# Patient Record
Sex: MALE | Race: WHITE | NOT HISPANIC OR LATINO | Employment: FULL TIME | URBAN - METROPOLITAN AREA
[De-identification: names, ages, dates, MRNs, and addresses within clinical notes are randomized per-mention and may not be internally consistent; named-entity substitution may affect disease eponyms.]

---

## 2020-03-16 ENCOUNTER — APPOINTMENT (EMERGENCY)
Dept: RADIOLOGY | Facility: HOSPITAL | Age: 60
End: 2020-03-16
Payer: COMMERCIAL

## 2020-03-16 ENCOUNTER — HOSPITAL ENCOUNTER (EMERGENCY)
Facility: HOSPITAL | Age: 60
Discharge: HOME | End: 2020-03-16
Attending: EMERGENCY MEDICINE | Admitting: EMERGENCY MEDICINE
Payer: COMMERCIAL

## 2020-03-16 VITALS
WEIGHT: 188.05 LBS | OXYGEN SATURATION: 97 % | TEMPERATURE: 98.6 F | SYSTOLIC BLOOD PRESSURE: 141 MMHG | RESPIRATION RATE: 19 BRPM | HEART RATE: 82 BPM | DIASTOLIC BLOOD PRESSURE: 84 MMHG

## 2020-03-16 DIAGNOSIS — J20.9 ACUTE BRONCHITIS: Primary | ICD-10-CM

## 2020-03-16 DIAGNOSIS — J44.9 COPD (CHRONIC OBSTRUCTIVE PULMONARY DISEASE) (HCC): ICD-10-CM

## 2020-03-16 PROCEDURE — 71046 X-RAY EXAM CHEST 2 VIEWS: CPT

## 2020-03-16 PROCEDURE — 99283 EMERGENCY DEPT VISIT LOW MDM: CPT

## 2020-03-16 PROCEDURE — 99284 EMERGENCY DEPT VISIT MOD MDM: CPT | Performed by: PHYSICIAN ASSISTANT

## 2020-03-16 RX ORDER — DOXYCYCLINE HYCLATE 100 MG/1
100 CAPSULE ORAL 2 TIMES DAILY
Qty: 14 CAPSULE | Refills: 0 | Status: SHIPPED | OUTPATIENT
Start: 2020-03-16 | End: 2020-03-23

## 2020-03-16 RX ORDER — ALBUTEROL SULFATE 90 UG/1
2 AEROSOL, METERED RESPIRATORY (INHALATION) EVERY 4 HOURS PRN
Qty: 1 INHALER | Refills: 0 | Status: SHIPPED | OUTPATIENT
Start: 2020-03-16

## 2020-03-16 RX ORDER — PREDNISONE 50 MG/1
50 TABLET ORAL DAILY
Qty: 5 TABLET | Refills: 0 | Status: SHIPPED | OUTPATIENT
Start: 2020-03-16 | End: 2020-03-26

## 2020-03-16 RX ORDER — BENZONATATE 100 MG/1
100 CAPSULE ORAL EVERY 8 HOURS
Qty: 12 CAPSULE | Refills: 0 | Status: SHIPPED | OUTPATIENT
Start: 2020-03-16 | End: 2020-03-20

## 2020-03-16 NOTE — ED PROVIDER NOTES
History  Chief Complaint   Patient presents with    Cough     cough/congestion for 1 week     Patient presents emergency room with a 1 week history of coughing with nasal congestion and postnasal drip  He denies fever chills  He is an active smoker  He has been using an inhaled steroid without any relief  He has not had any recent travels  He has not been exposed to anybody with the [place] 19 virus  Past medical history is positive for COPD  History provided by:  Patient  Cough   Cough characteristics:  Productive  Sputum characteristics:  Nondescript  Onset quality:  Gradual  Duration:  1 week  Timing:  Intermittent  Chronicity:  New  Context: smoke exposure, upper respiratory infection, weather changes and with activity    Context: not animal exposure, not exposure to allergens, not fumes, not occupational exposure and not sick contacts    Relieved by:  None tried  Worsened by: Activity, deep breathing, lying down, smoking and exposure to cold air  Ineffective treatments:  Rest (Steroid inhalor)  Associated symptoms: rhinorrhea, sinus congestion and wheezing    Associated symptoms: no chest pain, no chills, no diaphoresis, no ear fullness, no ear pain, no eye discharge, no fever, no headaches, no myalgias, no rash, no shortness of breath, no sore throat and no weight loss    Rhinorrhea:     Quality:  Clear    Severity:  Moderate    Duration:  1 week    Timing:  Intermittent    Progression:  Unchanged  Risk factors: recent infection    Risk factors: no chemical exposure and no recent travel        None       Past Medical History:   Diagnosis Date    COPD (chronic obstructive pulmonary disease) ([facility] [address] )        History reviewed  No pertinent surgical history  History reviewed  No pertinent family history  I have reviewed and agree with the history as documented      E-Cigarette/Vaping    E-Cigarette Use Never User      E-Cigarette/Vaping Substances    Nicotine No     THC No     CBD No     Flavoring No  Other No     Unknown No      Social History     Tobacco Use    Smoking status: Current Every Day Smoker    Smokeless tobacco: Current User   Substance Use Topics    Alcohol use: Not Currently    Drug use: Never       Review of Systems   Constitutional: Positive for activity change  Negative for appetite change, chills, diaphoresis, fatigue, fever, unexpected weight change and weight loss  HENT: Positive for congestion, postnasal drip and rhinorrhea  Negative for ear discharge, ear pain, mouth sores and sore throat  Eyes: Negative for discharge  Respiratory: Positive for cough and wheezing  Negative for shortness of breath  Cardiovascular: Negative for chest pain  Musculoskeletal: Negative for myalgias  Skin: Negative for color change, pallor and rash  Neurological: Negative for headaches  Psychiatric/Behavioral: Negative for confusion  All other systems reviewed and are negative  Physical Exam  Physical Exam   Constitutional: He is oriented to person, place, and time  He appears well-developed and well-nourished  No distress  HENT:   Head: Normocephalic and atraumatic  Right Ear: External ear normal    Left Ear: External ear normal    Mouth/Throat: No oropharyngeal exudate  Clear rhinorrhea, clear postnasal drip   Eyes: Conjunctivae are normal  Right eye exhibits no discharge  Left eye exhibits no discharge  Neck: Neck supple  Cardiovascular: Normal rate, regular rhythm and normal heart sounds  Exam reveals no gallop and no friction rub  No murmur heard  Pulmonary/Chest: Effort normal    Scattered rhonchi and expiratory wheezes  Lymphadenopathy:     He has cervical adenopathy  Neurological: He is alert and oriented to person, place, and time  Skin: Skin is warm  Capillary refill takes less than 2 seconds  No rash noted  He is not diaphoretic  Psychiatric: He has a normal mood and affect   His behavior is normal  Judgment and thought content normal    Nursing note and vitals reviewed  Vital Signs  ED Triage Vitals   Temperature Pulse Respirations Blood Pressure SpO2   03/16/20 0826 03/16/20 0826 03/16/20 0826 03/16/20 0826 03/16/20 0826   98 6 °F (37 °C) 67 16 (!) 192/102 97 %      Temp Source Heart Rate Source Patient Position - Orthostatic VS BP Location FiO2 (%)   03/16/20 0826 03/16/20 0826 03/16/20 0826 03/16/20 0826 --   Oral Monitor Sitting Right arm       Pain Score       03/16/20 0845       No Pain           Vitals:    03/16/20 0826 03/16/20 0840 03/16/20 0845   BP: (!) 192/102 148/83 141/84   Pulse: 67 83 82   Patient Position - Orthostatic VS: Sitting Lying Lying         Visual Acuity      ED Medications  Medications - No data to display    Diagnostic Studies  Results Reviewed     None                 XR chest 2 views   ED Interpretation by Farideh Liu PA-C (03/16 0911)   COPD, no pneumonia      Final Result by Ashly Woodard MD (03/16 1003)      No definite acute cardiopulmonary disease  Hyperlucency in the right upper lung due to emphysema producing vascular crowding in the right midlung  Workstation performed: [id number]                    Procedures  Procedures         ED Course                                 MDM  Number of Diagnoses or Management Options  Acute bronchitis: new and requires workup  COPD (chronic obstructive pulmonary disease) ([facility] [address] ): established and worsening     Amount and/or Complexity of Data Reviewed  Tests in the radiology section of CPT®: ordered and reviewed  Tests in the medicine section of CPT®: ordered and reviewed    Risk of Complications, Morbidity, and/or Mortality  Presenting problems: moderate  Diagnostic procedures: moderate  Management options: moderate  General comments: Patient presents emergency room for evaluation of a productive cough  He was seen and evaluated  An x-ray was obtained which demonstrated COPD but no acute pneumonia    The patient was given prescriptions for an albuterol inhaler, prednisone, doxycycline, Tessalon Perles  He was instructed to follow up with his family physician for recheck in 2 days  Should his symptoms worsen he was given reasons to return to the emergency room  Patient Progress  Patient progress: stable        Disposition  Final diagnoses:   Acute bronchitis   COPD (chronic obstructive pulmonary disease) ([facility] [address] )     Time reflects when diagnosis was documented in both MDM as applicable and the Disposition within this note     Time User Action Codes Description Comment    3/16/2020  8:56 AM Arlet Abbott Add [J20 9] Acute bronchitis     3/16/2020  9:12 AM Aniya Lopez Add [J44 9] COPD (chronic obstructive pulmonary disease) [facility])       ED Disposition     ED Disposition Condition Date/Time Comment    Discharge Stable Mon Mar 16, 2020  8:56 AM Nuzhat Alvarenga discharge to home/self care  Follow-up Information     Follow up With Specialties Details Why Contact Info    Lupe Avilez MD Family Medicine Schedule an appointment as soon as possible for a visit in 2 days  [place] [address] [address]ney [address]el  [phone]            Discharge Medication List as of 3/16/2020  9:22 AM      START taking these medications    Details   albuterol (PROVENTIL HFA,VENTOLIN HFA) 90 mcg/act inhaler Inhale 2 puffs every 4 (four) hours as needed for wheezing, Starting Mon 3/16/2020, Normal      benzonatate (TESSALON PERLES) 100 mg capsule Take 1 capsule (100 mg total) by mouth every 8 (eight) hours for 12 doses, Starting Mon 3/16/2020, Until Fri 3/20/2020, Normal      doxycycline hyclate (VIBRAMYCIN) 100 mg capsule Take 1 capsule (100 mg total) by mouth 2 (two) times a day for 7 days, Starting Mon 3/16/2020, Until Mon 3/23/2020, Normal      predniSONE 50 mg tablet Take 1 tablet (50 mg total) by mouth daily for 10 days, Starting Mon 3/16/2020, Until Thu 3/26/2020, Normal           No discharge procedures on file      PDMP Review     None          ED Provider  Electronically Signed by           Saurabh Domínguez PA-C  03/16/20 4889

## 2020-03-16 NOTE — ED NOTES
Discharge instructions reviewed with patient by REJI Haile  Pt ambulated to waiting room in negative distress   Slow, steady gait noted     Corky Azevedo RN  03/16/20 2098

## 2020-03-22 ENCOUNTER — HOSPITAL ENCOUNTER (EMERGENCY)
Facility: HOSPITAL | Age: 60
Discharge: HOME | End: 2020-03-22
Attending: EMERGENCY MEDICINE | Admitting: EMERGENCY MEDICINE
Payer: COMMERCIAL

## 2020-03-22 VITALS
TEMPERATURE: 97.8 F | OXYGEN SATURATION: 94 % | HEART RATE: 68 BPM | SYSTOLIC BLOOD PRESSURE: 153 MMHG | WEIGHT: 184.08 LBS | DIASTOLIC BLOOD PRESSURE: 94 MMHG | RESPIRATION RATE: 20 BRPM

## 2020-03-22 DIAGNOSIS — J44.1 COPD WITH ACUTE EXACERBATION (HCC): Primary | ICD-10-CM

## 2020-03-22 DIAGNOSIS — J06.9 UPPER RESPIRATORY INFECTION: ICD-10-CM

## 2020-03-22 PROCEDURE — 94644 CONT INHLJ TX 1ST HOUR: CPT

## 2020-03-22 PROCEDURE — 99285 EMERGENCY DEPT VISIT HI MDM: CPT

## 2020-03-22 PROCEDURE — 94760 N-INVAS EAR/PLS OXIMETRY 1: CPT

## 2020-03-22 PROCEDURE — 99283 EMERGENCY DEPT VISIT LOW MDM: CPT | Performed by: EMERGENCY MEDICINE

## 2020-03-22 RX ORDER — PREDNISONE 50 MG/1
50 TABLET ORAL DAILY
Qty: 5 TABLET | Refills: 0 | Status: SHIPPED | OUTPATIENT
Start: 2020-03-22 | End: 2020-03-27

## 2020-03-22 RX ORDER — ASPIRIN 81 MG/1
81 TABLET, CHEWABLE ORAL DAILY
COMMUNITY

## 2020-03-22 RX ORDER — SODIUM CHLORIDE FOR INHALATION 0.9 %
12 VIAL, NEBULIZER (ML) INHALATION ONCE
Status: COMPLETED | OUTPATIENT
Start: 2020-03-22 | End: 2020-03-22

## 2020-03-22 RX ADMIN — IPRATROPIUM BROMIDE 1 MG: 0.5 SOLUTION RESPIRATORY (INHALATION) at 12:41

## 2020-03-22 RX ADMIN — ALBUTEROL SULFATE 10 MG: 2.5 SOLUTION RESPIRATORY (INHALATION) at 12:41

## 2020-03-22 RX ADMIN — ISODIUM CHLORIDE 12 ML: 0.03 SOLUTION RESPIRATORY (INHALATION) at 12:41

## 2020-03-22 NOTE — ED PROVIDER NOTES
History  Chief Complaint   Patient presents with    Shortness of Breath     Evaluated in ED 3/16 for SOB  hx of COPD  here for same  continued symptoms  Patient presents to the emergency department for evaluation of breathing difficulty  Patient has a history of COPD and was here 5 days ago with similar presentation a mostly nonproductive cough  He had a negative chest x-ray and was placed on doxycycline along with steroids and discharged with an inhaler  He is complaining of exertional dyspnea without chest pain jaw arm or neck pain  He denies fever chills  Denies myalgias and arthralgias  Patient denies abdominal pain nausea vomiting diarrhea  Prior to Admission Medications   Prescriptions Last Dose Informant Patient Reported? Taking? albuterol (PROVENTIL HFA,VENTOLIN HFA) 90 mcg/act inhaler   No Yes   Sig: Inhale 2 puffs every 4 (four) hours as needed for wheezing   aspirin 81 mg chewable tablet   Yes Yes   Sig: Chew 81 mg daily   doxycycline hyclate (VIBRAMYCIN) 100 mg capsule   No No   Sig: Take 1 capsule (100 mg total) by mouth 2 (two) times a day for 7 days   fluticasone-salmeterol (Advair Diskus) 250-50 mcg/dose inhaler   Yes Yes   Sig: Inhale 1 puff 2 (two) times a day   predniSONE 50 mg tablet   No No   Sig: Take 1 tablet (50 mg total) by mouth daily for 10 days      Facility-Administered Medications: None       Past Medical History:   Diagnosis Date    COPD (chronic obstructive pulmonary disease) ([facility] [address] )        History reviewed  No pertinent surgical history  History reviewed  No pertinent family history  I have reviewed and agree with the history as documented      E-Cigarette/Vaping    E-Cigarette Use Never User      E-Cigarette/Vaping Substances    Nicotine No     THC No     CBD No     Flavoring No     Other No     Unknown No      Social History     Tobacco Use    Smoking status: Current Every Day Smoker    Smokeless tobacco: Current User   Substance Use Topics    Alcohol use: Not Currently    Drug use: Never       Review of Systems   Constitutional: Negative  Negative for activity change, appetite change, chills, diaphoresis, fatigue and fever  HENT: Positive for congestion  Negative for drooling, ear discharge, ear pain, nosebleeds, postnasal drip, rhinorrhea, sinus pressure, sinus pain, sneezing, sore throat, tinnitus, trouble swallowing and voice change  Eyes: Negative  Negative for photophobia and visual disturbance  Respiratory: Positive for cough and shortness of breath  Negative for choking, chest tightness, wheezing and stridor  Cardiovascular: Negative  Negative for chest pain, palpitations and leg swelling  Gastrointestinal: Negative  Negative for abdominal distention, abdominal pain, anal bleeding, blood in stool, diarrhea, nausea, rectal pain and vomiting  Endocrine: Negative  Genitourinary: Positive for decreased urine volume  Negative for discharge, dysuria, flank pain, frequency, hematuria, penile pain, penile swelling, scrotal swelling and testicular pain  Musculoskeletal: Negative  Negative for arthralgias, back pain, gait problem, joint swelling, myalgias, neck pain and neck stiffness  Skin: Negative  Negative for rash and wound  Allergic/Immunologic: Negative  Neurological: Negative  Negative for dizziness, tremors, seizures, syncope, facial asymmetry, speech difficulty, weakness, light-headedness, numbness and headaches  Hematological: Negative  Does not bruise/bleed easily  Psychiatric/Behavioral: Negative  Negative for agitation, behavioral problems and confusion  Physical Exam  Physical Exam   Constitutional: He is oriented to person, place, and time  He appears well-developed and well-nourished  Non-toxic appearance  He does not appear ill  No distress  He is not intubated  Nontoxic appearance  No obvious respiratory distress    Patient can speak in full sentences engage in normal conversation and answer simple questions appropriately  He does not appear uncomfortable or in pain  HENT:   Head: Normocephalic and atraumatic  Right Ear: External ear normal    Left Ear: External ear normal    Mouth/Throat: Oropharynx is clear and moist  No oropharyngeal exudate or posterior oropharyngeal edema  Eyes: Pupils are equal, round, and reactive to light  Conjunctivae and EOM are normal    Neck: Normal range of motion  Neck supple  Cardiovascular: Normal rate, regular rhythm, normal heart sounds and intact distal pulses  Pulmonary/Chest: Effort normal  No accessory muscle usage or stridor  No apnea, no tachypnea and no bradypnea  He is not intubated  No respiratory distress  He has decreased breath sounds  He has wheezes  He has no rhonchi  He has no rales  Chest wall is not dull to percussion  He exhibits no mass, no tenderness, no bony tenderness, no laceration, no crepitus, no edema, no deformity, no swelling and no retraction  Abdominal: Soft  Bowel sounds are normal  He exhibits no distension, no ascites and no mass  There is no splenomegaly or hepatomegaly  There is no tenderness  There is no rebound and no guarding  Musculoskeletal: Normal range of motion  Right lower leg: Normal  He exhibits no tenderness and no edema  Left lower leg: Normal  He exhibits no tenderness and no edema  Neurological: He is alert and oriented to person, place, and time  He has normal reflexes  He is not disoriented  No cranial nerve deficit  Skin: Skin is warm and dry  Capillary refill takes less than 2 seconds  No ecchymosis and no rash noted  He is not diaphoretic  No cyanosis or erythema  No pallor  Nails show no clubbing  Psychiatric: He has a normal mood and affect  His behavior is normal  His mood appears not anxious  He is not agitated  Nursing note and vitals reviewed        Vital Signs  ED Triage Vitals [03/22/20 1221]   Temperature Pulse Respirations Blood Pressure SpO2   97 8 °F (36 6 °C) 73 20 153/94 97 %      Temp Source Heart Rate Source Patient Position - Orthostatic VS BP Location FiO2 (%)   Oral Monitor Sitting Right arm --      Pain Score       --           Vitals:    03/22/20 1221   BP: 153/94   Pulse: 73   Patient Position - Orthostatic VS: Sitting         Visual Acuity      ED Medications  Medications   albuterol inhalation solution 10 mg (10 mg Nebulization Given 3/22/20 1241)   ipratropium (ATROVENT) 0 02 % inhalation solution 1 mg (1 mg Nebulization Given 3/22/20 1241)   sodium chloride 0 9 % inhalation solution 12 mL (12 mL Nebulization Given 3/22/20 1241)       Diagnostic Studies  Results Reviewed     None                 No orders to display              Procedures  Procedures         ED Course  ED Course as of Mar 22 1342   Sun Mar 22, 2020   1324 Patient improved after prolonged albuterol neb  Patient is stable discharge  Advised to continue antibiotics and steroids and inhaler  Discussed signs and symptoms return to department  Encouraged to follow up with private physician  1342 Patient given 5 days prednisone  MDM      Disposition  Final diagnoses:   COPD with acute exacerbation ([facility] [address] )   Upper respiratory infection     Time reflects when diagnosis was documented in both MDM as applicable and the Disposition within this note     Time User Action Codes Description Comment    3/22/2020  1:25 PM Ashli Long Add [J44 1] COPD with acute exacerbation ([facility] [address] )     3/22/2020  1:26 PM Renan Fortune Add [J06 9] Upper respiratory infection       ED Disposition     ED Disposition Condition Date/Time Comment    Discharge Stable [place] Mar 22, 2020  1:25 PM [place] discharge to home/self care              Follow-up Information     Follow up With Specialties Details Why Contact Info    Gamaliel Tirado MD Family Medicine Schedule an appointment as soon as possible for a visit   [address] [place] [address] Viola Mueller Patient's Medications   Discharge Prescriptions    PREDNISONE 50 MG TABLET    Take 1 tablet (50 mg total) by mouth daily for 5 days       Start Date: 3/22/2020 End Date: 3/27/2020       Order Dose: 50 mg       Quantity: 5 tablet    Refills: 0     No discharge procedures on file      PDMP Review     None          ED Provider  Electronically Signed by           Pasha Slater MD  03/22/20 MD Justina  03/22/20 1395

## 2022-02-05 ENCOUNTER — APPOINTMENT (EMERGENCY)
Dept: RADIOLOGY | Facility: HOSPITAL | Age: 62
End: 2022-02-05
Payer: COMMERCIAL

## 2022-02-05 ENCOUNTER — HOSPITAL ENCOUNTER (EMERGENCY)
Facility: HOSPITAL | Age: 62
Discharge: HOME | End: 2022-02-05
Attending: EMERGENCY MEDICINE | Admitting: EMERGENCY MEDICINE
Payer: COMMERCIAL

## 2022-02-05 VITALS
DIASTOLIC BLOOD PRESSURE: 91 MMHG | SYSTOLIC BLOOD PRESSURE: 162 MMHG | HEIGHT: 71 IN | RESPIRATION RATE: 16 BRPM | HEART RATE: 60 BPM | OXYGEN SATURATION: 98 % | TEMPERATURE: 97.6 F | BODY MASS INDEX: 25.67 KG/M2

## 2022-02-05 DIAGNOSIS — R07.9 CHEST PAIN, UNSPECIFIED TYPE: Primary | ICD-10-CM

## 2022-02-05 LAB
2HR DELTA HS TROPONIN: 0 NG/L
ALBUMIN SERPL BCP-MCNC: 3.7 G/DL (ref 3.5–5)
ALP SERPL-CCNC: 74 U/L (ref 46–116)
ALT SERPL W P-5'-P-CCNC: 19 U/L (ref 12–78)
ANION GAP SERPL CALCULATED.3IONS-SCNC: 8 MMOL/L (ref 4–13)
AST SERPL W P-5'-P-CCNC: 18 U/L (ref 5–45)
BASOPHILS # BLD AUTO: 0.06 THOUSANDS/ΜL (ref 0–0.1)
BASOPHILS NFR BLD AUTO: 1 % (ref 0–1)
BILIRUB SERPL-MCNC: 0.31 MG/DL (ref 0.2–1)
BUN SERPL-MCNC: 17 MG/DL (ref 5–25)
CALCIUM SERPL-MCNC: 8.9 MG/DL (ref 8.3–10.1)
CARDIAC TROPONIN I PNL SERPL HS: 13 NG/L
CARDIAC TROPONIN I PNL SERPL HS: 13 NG/L
CHLORIDE SERPL-SCNC: 102 MMOL/L (ref 100–108)
CO2 SERPL-SCNC: 27 MMOL/L (ref 21–32)
CREAT SERPL-MCNC: 1.14 MG/DL (ref 0.6–1.3)
EOSINOPHIL # BLD AUTO: 0.28 THOUSAND/ΜL (ref 0–0.61)
EOSINOPHIL NFR BLD AUTO: 5 % (ref 0–6)
ERYTHROCYTE [DISTWIDTH] IN BLOOD BY AUTOMATED COUNT: 14.6 % (ref 11.6–15.1)
GFR SERPL CREATININE-BSD FRML MDRD: 68 ML/MIN/1.73SQ M
GLUCOSE SERPL-MCNC: 124 MG/DL (ref 65–140)
HCT VFR BLD AUTO: 48.3 % (ref 36.5–49.3)
HGB BLD-MCNC: 15.8 G/DL (ref 12–17)
IMM GRANULOCYTES # BLD AUTO: 0.03 THOUSAND/UL (ref 0–0.2)
IMM GRANULOCYTES NFR BLD AUTO: 1 % (ref 0–2)
LYMPHOCYTES # BLD AUTO: 1.35 THOUSANDS/ΜL (ref 0.6–4.47)
LYMPHOCYTES NFR BLD AUTO: 25 % (ref 14–44)
MCH RBC QN AUTO: 29.4 PG (ref 26.8–34.3)
MCHC RBC AUTO-ENTMCNC: 32.7 G/DL (ref 31.4–37.4)
MCV RBC AUTO: 90 FL (ref 82–98)
MONOCYTES # BLD AUTO: 0.38 THOUSAND/ΜL (ref 0.17–1.22)
MONOCYTES NFR BLD AUTO: 7 % (ref 4–12)
NEUTROPHILS # BLD AUTO: 3.31 THOUSANDS/ΜL (ref 1.85–7.62)
NEUTS SEG NFR BLD AUTO: 61 % (ref 43–75)
NRBC BLD AUTO-RTO: 0 /100 WBCS
PLATELET # BLD AUTO: 238 THOUSANDS/UL (ref 149–390)
PMV BLD AUTO: 10.5 FL (ref 8.9–12.7)
POTASSIUM SERPL-SCNC: 4.2 MMOL/L (ref 3.5–5.3)
PROT SERPL-MCNC: 7.3 G/DL (ref 6.4–8.2)
RBC # BLD AUTO: 5.37 MILLION/UL (ref 3.88–5.62)
SODIUM SERPL-SCNC: 137 MMOL/L (ref 136–145)
WBC # BLD AUTO: 5.41 THOUSAND/UL (ref 4.31–10.16)

## 2022-02-05 PROCEDURE — 71046 X-RAY EXAM CHEST 2 VIEWS: CPT

## 2022-02-05 PROCEDURE — 36415 COLL VENOUS BLD VENIPUNCTURE: CPT | Performed by: PHYSICIAN ASSISTANT

## 2022-02-05 PROCEDURE — 93005 ELECTROCARDIOGRAM TRACING: CPT

## 2022-02-05 PROCEDURE — 99285 EMERGENCY DEPT VISIT HI MDM: CPT | Performed by: PHYSICIAN ASSISTANT

## 2022-02-05 PROCEDURE — 85025 COMPLETE CBC W/AUTO DIFF WBC: CPT | Performed by: PHYSICIAN ASSISTANT

## 2022-02-05 PROCEDURE — 80053 COMPREHEN METABOLIC PANEL: CPT | Performed by: PHYSICIAN ASSISTANT

## 2022-02-05 PROCEDURE — 84484 ASSAY OF TROPONIN QUANT: CPT | Performed by: PHYSICIAN ASSISTANT

## 2022-02-05 PROCEDURE — 99284 EMERGENCY DEPT VISIT MOD MDM: CPT

## 2022-02-05 NOTE — ED PROVIDER NOTES
History  Chief Complaint   Patient presents with    Abdominal Pain     per pt "he has amol having left upper quadrant pain with no N/V/D which has been ongoing for abour 3 days  "     70-year-old male presents the emergency department with complaints of upper abdominal pain  States that he has had intermittent aching pain in the left upper abdomen under his ribs over the past several days  States the pain is variable in intensity  Does not notice any modifying or alleviating factors  States the pain can be provoked  Does not always seem to occur with activity  Patient notes that he does have a very strenuous job it is not remember pulling any muscles in this area  Has not taken anything for pain prior to arrival   States that this morning pain was slightly stronger than usual so he sought evaluation in the emergency department  He denies associated chest pain, shortness of breath, nausea, vomiting, diarrhea, or diaphoresis  History provided by:  Patient   used: No        Prior to Admission Medications   Prescriptions Last Dose Informant Patient Reported? Taking? Cholecalciferol 25 MCG (1000 UT) tablet   Yes No   Sig: Take 1,000 Units by mouth daily   albuterol (PROVENTIL HFA,VENTOLIN HFA) 90 mcg/act inhaler   No Yes   Sig: Inhale 2 puffs every 4 (four) hours as needed for wheezing   aspirin 81 mg chewable tablet   Yes Yes   Sig: Chew 81 mg daily   fluticasone-salmeterol (Advair Diskus) 250-50 mcg/dose inhaler   Yes Yes   Sig: Inhale 1 puff 2 (two) times a day      Facility-Administered Medications: None       Past Medical History:   Diagnosis Date    COPD (chronic obstructive pulmonary disease) ([facility] [address] )        History reviewed  No pertinent surgical history  History reviewed  No pertinent family history  I have reviewed and agree with the history as documented      E-Cigarette/Vaping    E-Cigarette Use Never User      E-Cigarette/Vaping Substances    Nicotine No     THC No     CBD No     Flavoring No     Other No     Unknown No      Social History     Tobacco Use    Smoking status: Current Every Day Smoker    Smokeless tobacco: Current User   Vaping Use    Vaping Use: Never used   Substance Use Topics    Alcohol use: Not Currently    Drug use: Never       Review of Systems   Constitutional: Negative for activity change, appetite change, chills and fever  HENT: Negative for congestion, dental problem, drooling, ear discharge, ear pain, mouth sores, nosebleeds, rhinorrhea, sore throat and trouble swallowing  Eyes: Negative for pain, discharge and itching  Respiratory: Negative for cough, chest tightness, shortness of breath and wheezing  Cardiovascular: Positive for chest pain  Negative for palpitations  Gastrointestinal: Positive for abdominal pain  Negative for blood in stool, constipation, diarrhea, nausea and vomiting  Endocrine: Negative for cold intolerance and heat intolerance  Genitourinary: Negative for difficulty urinating, dysuria, flank pain, frequency and urgency  Skin: Negative for rash and wound  Allergic/Immunologic: Negative for food allergies and immunocompromised state  Neurological: Negative for dizziness, seizures, syncope, weakness, numbness and headaches  Psychiatric/Behavioral: Negative for agitation, behavioral problems and confusion  Physical Exam  Physical Exam  Vitals and nursing note reviewed  Constitutional:       General: He is not in acute distress  Appearance: He is not diaphoretic  HENT:      Head: Normocephalic and atraumatic  Right Ear: External ear normal       Left Ear: External ear normal       Mouth/Throat:      Pharynx: No oropharyngeal exudate  Eyes:      Conjunctiva/sclera: Conjunctivae normal    Neck:      Vascular: No JVD  Trachea: No tracheal deviation  Cardiovascular:      Rate and Rhythm: Normal rate and regular rhythm  Heart sounds: Normal heart sounds  No murmur heard    No friction rub  No gallop  Pulmonary:      Effort: Pulmonary effort is normal  No respiratory distress  Breath sounds: Normal breath sounds  No wheezing or rales  Chest:      Chest wall: No tenderness  Abdominal:      General: Bowel sounds are normal  There is no distension  Palpations: Abdomen is soft  Tenderness: There is no abdominal tenderness  There is no guarding  Musculoskeletal:         General: No tenderness or deformity  Normal range of motion  Lymphadenopathy:      Cervical: No cervical adenopathy  Skin:     General: Skin is warm and dry  Findings: No erythema or rash  Neurological:      Mental Status: He is alert and oriented to person, place, and time     Psychiatric:         Mood and Affect: Mood normal          Behavior: Behavior normal          Vital Signs  ED Triage Vitals [02/05/22 0829]   Temperature Pulse Respirations Blood Pressure SpO2   97 6 °F (36 4 °C) 74 16 162/91 98 %      Temp Source Heart Rate Source Patient Position - Orthostatic VS BP Location FiO2 (%)   Oral Monitor Lying Right arm --      Pain Score       3           Vitals:    02/05/22 0829 02/05/22 0945   BP: 162/91    Pulse: 74 60   Patient Position - Orthostatic VS: Lying          Visual Acuity      ED Medications  Medications - No data to display    Diagnostic Studies  Results Reviewed     Procedure Component Value Units Date/Time    HS Troponin I 2hr [[id number]]  (Normal) Collected: 02/05/22 1100    Lab Status: Final result Specimen: Blood from Arm, Right Updated: 02/05/22 1147     hs TnI 2hr 13 ng/L      Delta 2hr hsTnI 0 ng/L     HS Troponin I 4hr [[id number]]     Lab Status: No result Specimen: Blood     Comprehensive metabolic panel [[id number]] Collected: 02/05/22 0857    Lab Status: Final result Specimen: Blood from Arm, Right Updated: 02/05/22 0943     Sodium 137 mmol/L      Potassium 4 2 mmol/L      Chloride 102 mmol/L      CO2 27 mmol/L      ANION GAP 8 mmol/L      BUN 17 mg/dL Creatinine 1 14 mg/dL      Glucose 124 mg/dL      Calcium 8 9 mg/dL      AST 18 U/L      ALT 19 U/L      Alkaline Phosphatase 74 U/L      Total Protein 7 3 g/dL      Albumin 3 7 g/dL      Total Bilirubin 0 31 mg/dL      eGFR 68 ml/min/1 73sq m     Narrative:      [facility] guidelines for Chronic Kidney Disease (CKD):     Stage 1 with normal or high GFR (GFR > 90 mL/min/1 73 square meters)    Stage 2 Mild CKD (GFR = 60-89 mL/min/1 73 square meters)    Stage 3A Moderate CKD (GFR = 45-59 mL/min/1 73 square meters)    Stage 3B Moderate CKD (GFR = 30-44 mL/min/1 73 square meters)    Stage 4 Severe CKD (GFR = 15-29 mL/min/1 73 square meters)    Stage 5 End Stage CKD (GFR <15 mL/min/1 73 square meters)  Note: GFR calculation is accurate only with a steady state creatinine    HS Troponin 0hr (reflex protocol) [[id number]]  (Normal) Collected: 02/05/22 0857    Lab Status: Final result Specimen: Blood from Arm, Right Updated: 02/05/22 0939     hs TnI 0hr 13 ng/L     CBC and differential [[id number]] Collected: 02/05/22 0857    Lab Status: Final result Specimen: Blood from Arm, Right Updated: 02/05/22 0913     WBC 5 41 Thousand/uL      RBC 5 37 Million/uL      Hemoglobin 15 8 g/dL      Hematocrit 48 3 %      MCV 90 fL      MCH 29 4 pg      MCHC 32 7 g/dL      RDW 14 6 %      MPV 10 5 fL      Platelets 738 Thousands/uL      nRBC 0 /100 WBCs      Neutrophils Relative 61 %      Immat GRANS % 1 %      Lymphocytes Relative 25 %      Monocytes Relative 7 %      Eosinophils Relative 5 %      Basophils Relative 1 %      Neutrophils Absolute 3 31 Thousands/µL      Immature Grans Absolute 0 03 Thousand/uL      Lymphocytes Absolute 1 35 Thousands/µL      Monocytes Absolute 0 38 Thousand/µL      Eosinophils Absolute 0 28 Thousand/µL      Basophils Absolute 0 06 Thousands/µL                  XR chest 2 views   Final Result by Tyrone Lee MD (02/05 9195)      No acute cardiopulmonary disease        Evidence of COPD                  Workstation performed: [id number]                    Procedures  ECG 12 Lead Documentation Only    Date/Time: 2/5/2022 10:05 AM  Performed by: Ti Crane PA-C  Authorized by: Ti Crane PA-C     Indications / Diagnosis:  Pain  ECG reviewed by me, the ED Provider: yes    Patient location:  ED  Previous ECG:     Previous ECG:  Unavailable  Interpretation:     Interpretation: normal    Rate:     ECG rate:  64    ECG rate assessment: normal    Rhythm:     Rhythm: sinus rhythm    Ectopy:     Ectopy: PVCs      PVCs:  Infrequent  QRS:     QRS axis:  Normal    QRS intervals:  Normal  Conduction:     Conduction: normal    ST segments:     ST segments:  Normal  T waves:     T waves: normal               ED Course  ED Course as of 02/05/22 1237   Sat Feb 05, 2022   0928 Call placed to reading room for x-ray results  26 Had a long conversation with patient regarding test results thus far  I would prefer that he stays until 2 hour troponin is resulted  Patient tells me that he needs to leave by 11:00 a m  To be somewhere by noon  Patient is agreeable to staying to have delta troponin drawn, but does not want to wait until it was resulted  Will call with results  Discussed that if troponin is elevated he will need to return to the emergency department  HEART Risk Score      Most Recent Value   Heart Score Risk Calculator    History 1 Filed at: 02/05/2022 1000   ECG 0 Filed at: 02/05/2022 1000   Age 1 Filed at: 02/05/2022 1000   Risk Factors 1 Filed at: 02/05/2022 1000   Troponin 1 Filed at: 02/05/2022 1000   HEART Score 4 Filed at: 02/05/2022 1000                                      MDM  Number of Diagnoses or Management Options  Chest pain, unspecified type  Diagnosis management comments: Differential diagnosis includes but not limited to:   Atypical chest pain, chest wall strain, pleuritic pain         Amount and/or Complexity of Data Reviewed  Clinical lab tests: ordered and reviewed (Called patient regarding repeat troponin after discharge )  Tests in the radiology section of CPT®: ordered and reviewed  Independent visualization of images, tracings, or specimens: yes        Disposition  Final diagnoses:   Chest pain, unspecified type     Time reflects when diagnosis was documented in both MDM as applicable and the Disposition within this note     Time User Action Codes Description Comment    2/5/2022 10:27 AM Alyssa Pierce Tony Add [R07 9] Chest pain, unspecified type       ED Disposition     ED Disposition Condition Date/Time Comment    Discharge Stable Sat Feb 5, 2022 10:27 AM Charlotte Fairly discharge to home/self care  Follow-up Information     Follow up With Specialties Details Why Contact Info    Jasmeet Morales MD Family Medicine   [address] [place] [id number]  [phone]            Discharge Medication List as of 2/5/2022 10:28 AM      CONTINUE these medications which have NOT CHANGED    Details   albuterol (PROVENTIL HFA,VENTOLIN HFA) 90 mcg/act inhaler Inhale 2 puffs every 4 (four) hours as needed for wheezing, Starting Mon 3/16/2020, Normal      aspirin 81 mg chewable tablet Chew 81 mg daily, Historical Med      fluticasone-salmeterol (Advair Diskus) 250-50 mcg/dose inhaler Inhale 1 puff 2 (two) times a day, Starting Mon 1/23/2017, Historical Med      Cholecalciferol 25 MCG (1000 UT) tablet Take 1,000 Units by mouth daily, Historical Med             Outpatient Discharge Orders   Echo stress test, exercise   Standing Status: Future Standing Exp   Date: 02/05/26       PDMP Review     None          ED Provider  Electronically Signed by           Maryellen Chaudhry PA-C  02/05/22 807 WALKER Faust PA-C  02/05/22 1161

## 2022-02-06 LAB
ATRIAL RATE: 65 BPM
P AXIS: 83 DEGREES
PR INTERVAL: 142 MS
QRS AXIS: 70 DEGREES
QRSD INTERVAL: 106 MS
QT INTERVAL: 402 MS
QTC INTERVAL: 418 MS
T WAVE AXIS: 62 DEGREES
VENTRICULAR RATE: 65 BPM

## 2022-02-06 PROCEDURE — 93010 ELECTROCARDIOGRAM REPORT: CPT | Performed by: INTERNAL MEDICINE

## 2022-08-22 ENCOUNTER — HOSPITAL ENCOUNTER (OUTPATIENT)
Dept: VASCULAR ULTRASOUND | Facility: HOSPITAL | Age: 62
Discharge: HOME | End: 2022-08-22
Payer: COMMERCIAL

## 2022-08-22 DIAGNOSIS — Z13.9 SCREENING DUE: ICD-10-CM

## 2022-08-22 PROCEDURE — 93880 EXTRACRANIAL BILAT STUDY: CPT | Performed by: SURGERY

## 2022-08-22 PROCEDURE — 93922 UPR/L XTREMITY ART 2 LEVELS: CPT

## 2022-08-22 PROCEDURE — 93922 UPR/L XTREMITY ART 2 LEVELS: CPT | Performed by: SURGERY

## 2022-08-22 PROCEDURE — 93978 VASCULAR STUDY: CPT | Performed by: SURGERY

## 2023-01-09 ENCOUNTER — HOSPITAL ENCOUNTER (EMERGENCY)
Facility: HOSPITAL | Age: 63
Discharge: HOME | End: 2023-01-09
Attending: EMERGENCY MEDICINE

## 2023-01-09 ENCOUNTER — APPOINTMENT (EMERGENCY)
Dept: VASCULAR ULTRASOUND | Facility: HOSPITAL | Age: 63
End: 2023-01-09

## 2023-01-09 VITALS
TEMPERATURE: 98 F | HEART RATE: 57 BPM | RESPIRATION RATE: 20 BRPM | SYSTOLIC BLOOD PRESSURE: 129 MMHG | OXYGEN SATURATION: 98 % | DIASTOLIC BLOOD PRESSURE: 93 MMHG

## 2023-01-09 DIAGNOSIS — R20.2 PARESTHESIA: ICD-10-CM

## 2023-01-09 DIAGNOSIS — I72.4 FEMORAL ARTERY ANEURYSM (HCC): ICD-10-CM

## 2023-01-09 DIAGNOSIS — I73.9 PERIPHERAL ARTERY DISEASE (HCC): Primary | ICD-10-CM

## 2023-01-09 DIAGNOSIS — T82.898A OCCLUSION OF FEMOROPOPLITEAL BYPASS GRAFT, INITIAL ENCOUNTER (HCC): ICD-10-CM

## 2023-01-09 NOTE — Clinical Note
Edwin Fields was seen and treated in our emergency department on 1/9/2023  Diagnosis:     Gracie Bhagat  is off the rest of the shift today  He may return on this date: If you have any questions or concerns, please don't hesitate to call        Zoe Irizarry, DO    ______________________________           _______________          _______________  Hospital Representative                              Date                                Time

## 2023-01-09 NOTE — DISCHARGE INSTRUCTIONS
Follow-up with vascular surgery, call for appointment this coming week   Return to the ED if your symptoms worsen including cold extremity as prior, severe pain, etc

## 2023-01-09 NOTE — ED ATTENDING ATTESTATION
1/9/2023  IJesus Manuel DO, saw and evaluated the patient  I have discussed the patient with the resident/non-physician practitioner and agree with the resident's/non-physician practitioner's findings, Plan of Care, and MDM as documented in the resident's/non-physician practitioner's note, except where noted  All available labs and Radiology studies were reviewed  I was present for key portions of any procedure(s) performed by the resident/non-physician practitioner and I was immediately available to provide assistance  At this point I agree with the current assessment done in the Emergency Department  I have conducted an independent evaluation of this patient a history and physical is as follows:    60-year-old male, left lower leg numbness, has been intermittent for couple of days, says it is more from the knee down ,  Has full range of motion has no pain, does not feel temperature difference in his foot/leg  Has a history of a femoral bypass graft prior to getting that graft he said his foot was cold for weeks and had sores on his feet says this feels completely different  No new footwear no new falls injury or trauma  Eating well drinking well    Review of Systems   Constitutional: Negative for activity change, chills, diaphoresis and fever  HENT: Negative for congestion, sinus pressure and sore throat  Eyes: Negative for pain and visual disturbance  Respiratory: Negative for cough, chest tightness, shortness of breath, wheezing and stridor  Cardiovascular: Negative for chest pain and palpitations  Gastrointestinal: Negative for abdominal distention, abdominal pain, constipation, diarrhea, nausea and vomiting  Genitourinary: Negative for dysuria and frequency  Musculoskeletal: Negative for neck pain and neck stiffness  Skin: Negative for rash  Neurological: Negative for dizziness, speech difficulty, light-headedness, and headaches  Physical Exam  Vitals reviewed  Constitutional:       General: He is not in acute distress  Appearance: He is well-developed  He is not diaphoretic  HENT:      Head: Normocephalic and atraumatic  Right Ear: External ear normal       Left Ear: External ear normal       Nose: Nose normal    Eyes:      General:         Right eye: No discharge  Left eye: No discharge  Pupils: Pupils are equal, round, and reactive to light  Neck:      Trachea: No tracheal deviation  Cardiovascular:      Rate and Rhythm: Normal rate and regular rhythm  Heart sounds: Normal heart sounds  No murmur heard  Pulmonary:      Effort: Pulmonary effort is normal  No respiratory distress  Breath sounds: Normal breath sounds  No stridor  Abdominal:      General: There is no distension  Palpations: Abdomen is soft  Tenderness: There is no abdominal tenderness  There is no guarding or rebound  Musculoskeletal:         General: Normal range of motion  Cervical back: Normal range of motion and neck supple  Comments: Left lower leg,  unable to palpate pulses of femoral popliteal posterior tibial or dorsalis pedis, I am able to get dopplerable pulses  ,  Popliteal posterior tibial and dorsalis pedis  Foot is warm, capillary refill is about 3 seconds which is the same on the other side, full range of motion, patient able to stand up and ambulate without any signs of claudication   Skin:     General: Skin is warm and dry  Coloration: Skin is not pale  Findings: No erythema  Neurological:      General: No focal deficit present  Mental Status: He is alert and oriented to person, place, and time               Labs Reviewed - No data to display        VAS lower limb venous duplex study, unilateral/limited    (Results Pending)           MDM  Number of Diagnoses or Management Options  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): new, needed workup  Paresthesia: new, needed workup  [facility] new, needed workup  Diagnosis management comments:       Initial ED assessment: 20-year-old male, left leg  numbness, no pain, on exam dopplerable pulses but nonpalpable    Initial DDx includes but is not limited to: Arterial occlusion, venous occlusion, claudication    Initial ED plan:   Doppler ultrasound        Final ED summary/disposition:   After evaluation and workup in the emergency department, found to have occlusion of bypass graft, I do not think this is acute, has no associated pain the foot is warm, will discharge with urgent outpatient vascular surgery follow-up, outpatient referral placed        Amount and/or Complexity of Data Reviewed  Tests in the radiology section of CPT®: ordered and reviewed  Review and summarize past medical records: yes             ED Course  ED Course as of 01/09/23 1502   Mon Jan 09, 2023   0957 Able to Doppler a posterior tibial pulse ,  Received notification from vascular technician about study showing his graft not functioning ,  His leg is warm he has a dopplerable pulse I do not think this was an acute occlusion that happened in the hyper recent  Patient to follow with outpatient vascular surgery  ,  Will put in referral   Will discharge           Critical Care Time  Procedures        Time reflects when diagnosis was documented in both MDM as applicable and the Disposition within this note     Time User Action Codes Description Comment    1/9/2023  7:17 AM Manual Goodell Add [R20 2] Paresthesia     1/9/2023  7:17 AM Manual Goodell Add [I73 9] Peripheral artery disease ([facility] )     1/9/2023  9:52 AM Manual Goodell Add [I73 9] Peripheral vascular disease ([facility] )     1/9/2023  9:52 AM Manual Goodell Remove [I73 9] Peripheral vascular disease ([facility] )     1/9/2023  9:52 AM Manual Goodell Modify [R20 2] Paresthesia     1/9/2023  9:52 AM Manual Goodell Modify [I73 9] Peripheral artery disease ([facility] )     1/9/2023 10:04 AM Real Rivera Add [I72 4] 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[facility] )     1/9/2023 10:06 AM Real Rivera Add [G34 898D] Occlusion of femoropopliteal bypass graft, initial encounter ([facility] )     1/9/2023 10:07 AM Real Rivera Modify [T90 938K] Occlusion of femoropopliteal bypass graft, initial encounter [facility]) left      ED Disposition     ED Disposition   Discharge    Condition   Stable    Date/Time   Mon Jan 9, 2023  9:52 AM    Comment   Tarry Necessary Shelby discharge to home/self care  Follow-up Information     Follow up With Specialties Details Why Contact Info Additional Information    [facility] Emergency Department Emergency Medicine  As needed [address] Emergency Department, [address], [place], [place], [address] Vascular Surgery Call today Call today to schedule an appointment   [address] [phone] [address] , [place] [id number], [place], [place], [address] [facility]

## 2023-01-09 NOTE — ED PROVIDER NOTES
History  Chief Complaint   Patient presents with   • Numbness     Patient reporting L Lower leg numbness  States he is numb from the knee down  States this began last night  Past surgery on same leg, states it was a "clogged artery"     HPI Pt is a 57 y/o m presenting with left lower leg numbness/tingling beginning last night while sitting on his couch  PmHx of claudication + vascular surgery on left iliac artery with bypass graft placed to femoral/popliteal artery  Sxs were more severe prior to surgery, including cool to touch  Current smoker, many pack years  Had sensation of "walking on a ball" while at work - no pain currently but had pain with severe tingling last night  Extremity has not been cool nor swollen  Prior to Admission Medications   Prescriptions Last Dose Informant Patient Reported? Taking? Cholecalciferol 25 MCG (1000 UT) tablet   Yes No   Sig: Take 1,000 Units by mouth daily   albuterol (PROVENTIL HFA,VENTOLIN HFA) 90 mcg/act inhaler   No No   Sig: Inhale 2 puffs every 4 (four) hours as needed for wheezing   aspirin 81 mg chewable tablet   Yes No   Sig: Chew 81 mg daily   fluticasone-salmeterol (Advair Diskus) 250-50 mcg/dose inhaler   Yes No   Sig: Inhale 1 puff 2 (two) times a day      Facility-Administered Medications: None       Past Medical History:   Diagnosis Date   • COPD (chronic obstructive pulmonary disease) ([facility] )        History reviewed  No pertinent surgical history  History reviewed  No pertinent family history  I have reviewed and agree with the history as documented      E-Cigarette/Vaping   • E-Cigarette Use Never User      E-Cigarette/Vaping Substances   • Nicotine No    • THC No    • CBD No    • Flavoring No    • Other No    • Unknown No      Social History     Tobacco Use   • Smoking status: Every Day     Packs/day: 1 50     Types: Cigarettes   • Smokeless tobacco: Current   Vaping Use   • Vaping Use: Never used   Substance Use Topics   • Alcohol use: Not Currently   • Drug use: Never        Review of Systems   Constitutional: Negative for chills and fever  HENT: Negative for congestion, rhinorrhea and sore throat  Eyes: Negative for photophobia, pain, redness and visual disturbance  Respiratory: Positive for cough (chronic)  Negative for chest tightness, shortness of breath, wheezing and stridor  Cardiovascular: Negative for chest pain, palpitations and leg swelling  Gastrointestinal: Negative for abdominal distention, abdominal pain, constipation, diarrhea, nausea and vomiting  Genitourinary: Negative for difficulty urinating, dysuria and flank pain  Musculoskeletal: Positive for myalgias (left lower leg)  Negative for joint swelling, neck pain and neck stiffness  Skin: Negative for rash  Neurological: Positive for numbness (left lower leg)  Negative for seizures, syncope, facial asymmetry, speech difficulty, weakness, light-headedness and headaches  Psychiatric/Behavioral: Negative for confusion  All other systems reviewed and are negative  Physical Exam  ED Triage Vitals   Temperature Pulse Respirations Blood Pressure SpO2   01/09/23 0645 01/09/23 0643 01/09/23 0643 01/09/23 0643 01/09/23 0643   98 °F (36 7 °C) 57 20 129/93 98 %      Temp src Heart Rate Source Patient Position - Orthostatic VS BP Location FiO2 (%)   -- 01/09/23 0643 01/09/23 0643 01/09/23 0643 --    Monitor Sitting Right arm       Pain Score       --                    Orthostatic Vital Signs  Vitals:    01/09/23 0643   BP: 129/93   Pulse: 57   Patient Position - Orthostatic VS: Sitting       Physical Exam  Vitals and nursing note reviewed  Constitutional:       General: He is not in acute distress  Appearance: Normal appearance  He is not ill-appearing, toxic-appearing or diaphoretic  HENT:      Head: Normocephalic and atraumatic  Nose: Nose normal  No congestion or rhinorrhea        Mouth/Throat:      Mouth: Mucous membranes are moist       Pharynx: Oropharynx is clear  No oropharyngeal exudate or posterior oropharyngeal erythema  Eyes:      General: No scleral icterus  Right eye: No discharge  Left eye: No discharge  Extraocular Movements: Extraocular movements intact  Conjunctiva/sclera: Conjunctivae normal       Pupils: Pupils are equal, round, and reactive to light  Neck:      Vascular: No carotid bruit  Cardiovascular:      Rate and Rhythm: Normal rate and regular rhythm  Pulses:           Carotid pulses are 2+ on the right side and 2+ on the left side  Radial pulses are 2+ on the right side and 2+ on the left side  Femoral pulses are 2+ on the right side and 1+ on the left side  Popliteal pulses are 1+ on the right side and 0 on the left side  Dorsalis pedis pulses are 1+ on the right side and 0 on the left side  Posterior tibial pulses are 1+ on the right side and detected w/ Doppler on the left side  Heart sounds: Normal heart sounds  No murmur heard  No friction rub  No gallop  Pulmonary:      Effort: Pulmonary effort is normal  No respiratory distress  Breath sounds: Normal breath sounds  No stridor  No wheezing, rhonchi or rales  Chest:      Chest wall: No tenderness  Abdominal:      General: Abdomen is flat  Bowel sounds are normal  There is no distension  Palpations: Abdomen is soft  There is no mass  Tenderness: There is no abdominal tenderness  There is no right CVA tenderness, left CVA tenderness, guarding or rebound  Hernia: No hernia is present  Musculoskeletal:         General: No swelling, tenderness, deformity or signs of injury  Normal range of motion  Cervical back: Normal range of motion and neck supple  No rigidity or tenderness  Right lower leg: No edema  Left lower leg: No edema  Lymphadenopathy:      Cervical: No cervical adenopathy  Skin:     General: Skin is warm and dry  Coloration: Skin is not jaundiced or pale  Findings: No bruising, erythema, lesion or rash  Neurological:      General: No focal deficit present  Mental Status: He is alert and oriented to person, place, and time  Cranial Nerves: No cranial nerve deficit  Sensory: No sensory deficit  Motor: No weakness  Coordination: Coordination normal       Gait: Gait normal       Deep Tendon Reflexes: Reflexes normal    Psychiatric:         Mood and Affect: Mood normal          Behavior: Behavior normal          ED Medications  Medications - No data to display    Diagnostic Studies  Results Reviewed     None                 VAS lower limb venous duplex study, unilateral/limited   Final Result by Eh Marie MD (01/09 8435)            Procedures  Procedures      ED Course     Venous doppler of left lower extremity ordered  Pt resting comfortably without any current sxs  DVT negative but other concerning findings  CONCLUSION:  RIGHT LOWER LIMB LIMITED:  Evaluation shows no evidence of thrombus in the common femoral vein  Doppler evaluation shows a normal response to augmentation maneuvers  The common femoral artery appears ectatic measuring approximately 1 6 cm  LEFT LOWER LIMB:  No evidence of acute or chronic deep vein thrombosis  No evidence of superficial thrombophlebitis noted  Doppler evaluation shows a normal response to augmentation maneuvers  Popliteal and posterior tibial arterial Doppler waveforms are severely dampened  and monophasic  Anterior tibial artery appears chronically occluded  Femoral-above knee popliteal bypass graft appears occluded  the common femoral  artery appears aneurysmal measuring 3 1 x 2 9 cm  Able to doppler tibialis posterior, monophasic, foot is warm to touch and he currently has feeling in the left foot/lower leg  He would like to f/u with vascular surgery with [facility] - ambulatory referral ordered   He will return to the ED with worsening/returning symptoms including pain and cool extremity - he is agreeable to plan  Currently vitals are stable and wnl  SBIRT 20yo+    Flowsheet Row Most Recent Value   SBIRT (23 yo +)    In order to provide better care to our patients, we are screening all of our patients for alcohol and drug use  Would it be okay to ask you these screening questions? Yes Filed at: 01/09/2023 2109   Initial Alcohol Screen: US AUDIT-C     1  How often do you have a drink containing alcohol? 0 Filed at: 01/09/2023 0647   2  How many drinks containing alcohol do you have on a typical day you are drinking? 0 Filed at: 01/09/2023 0647   3a  Male UNDER 65: How often do you have five or more drinks on one occasion? 0 Filed at: 01/09/2023 0647   3b  FEMALE Any Age, or MALE 65+: How often do you have 4 or more drinks on one occassion? 0 Filed at: 01/09/2023 0647   Audit-C Score 0 Filed at: 01/09/2023 6107   MIGUELITO: How many times in the past year have you    Used an illegal drug or used a prescription medication for non-medical reasons?  Never Filed at: 01/09/2023 6729                MDM  Peripheral artery disease  DVT  parasthesia  Do not suspect     Disposition  Final diagnoses:   Paresthesia   Peripheral artery disease ([facility] )   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HCC)   Occlusion of femoropopliteal bypass graft, initial encounter ([place] [id number] ) - left     Time reflects when diagnosis was documented in both MDM as applicable and the Disposition within this note     Time User Action Codes Description Comment    1/9/2023  7:17 AM Everlyn Miguel Add [R20 2] Paresthesia     1/9/2023  7:17 AM Everlyn Miguel Add [I73 9] Peripheral artery disease ([facility] )     1/9/2023  9:52 AM Everlyn Miguel Add [I73 9] Peripheral vascular disease ([facility] )     1/9/2023  9:52 AM Everlyn Miguel Remove [I73 9] Peripheral vascular disease ([facility] )     1/9/2023  9:52 AM Everlyn Miguel Modify [R20 2] Paresthesia     1/9/2023  9:52 AM Everlyn Miguel Modify [I73 9] Peripheral artery disease ([facility] )     1/9/2023 10:04 AM Lethaniel Toan Add [I72 4] 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[facility] )     1/9/2023 10:06 AM Lethaniel Toan Add [A66 268S] Occlusion of femoropopliteal bypass graft, initial encounter ([facility] )     1/9/2023 10:07 AM Lethaniel Toan Modify [X19 351Z] Occlusion of femoropopliteal bypass graft, initial encounter [facility]) left      ED Disposition     ED Disposition   Discharge    Condition   Stable    Date/Time   Mon Jan 9, 2023  9:52 AM    Comment   Samy Ryan discharge to home/self care  Follow-up Information     Follow up With Specialties Details Why Contact Info Additional Information    [facility] Emergency Department Emergency Medicine  As needed [address] Emergency Department, [address], [place], [place], [address] [facility] Vascular Surgery Call today Call today to schedule an appointment  [address]  [phone] [address], [facility] [address], [place], [place], [address]           Discharge Medication List as of 1/9/2023 10:08 AM      CONTINUE these medications which have NOT CHANGED    Details   albuterol (PROVENTIL HFA,VENTOLIN HFA) 90 mcg/act inhaler Inhale 2 puffs every 4 (four) hours as needed for wheezing, Starting Mon 3/16/2020, Normal      aspirin 81 mg chewable tablet Chew 81 mg daily, Historical Med      Cholecalciferol 25 MCG (1000 UT) tablet Take 1,000 Units by mouth daily, Historical Med      fluticasone-salmeterol (Advair Diskus) 250-50 mcg/dose inhaler Inhale 1 puff 2 (two) times a day, Starting Mon 1/23/2017, Historical Med               PDMP Review     None           ED Provider  Attending physically available and evaluated Dipak Klein I managed the patient along with the ED Attending      Electronically Signed by         Deyvi Silvestre DO  01/09/23 2028

## 2023-01-15 PROBLEM — I73.9 PERIPHERAL ARTERIAL DISEASE (HCC): Status: ACTIVE | Noted: 2023-01-01

## 2023-01-15 PROBLEM — I71.40 ABDOMINAL AORTIC ANEURYSM (AAA) 3.0 CM TO 5.5 CM IN DIAMETER IN MALE (HCC): Status: ACTIVE | Noted: 2023-01-15

## 2023-01-15 PROBLEM — I73.9 PERIPHERAL ARTERIAL DISEASE (HCC): Status: ACTIVE | Noted: 2023-01-15

## 2023-01-15 PROBLEM — F17.200 SMOKING: Status: ACTIVE | Noted: 2023-01-01

## 2023-01-15 PROBLEM — E78.5 HYPERLIPEMIA: Status: ACTIVE | Noted: 2023-01-15

## 2023-01-15 PROBLEM — J44.9 COPD (CHRONIC OBSTRUCTIVE PULMONARY DISEASE) (HCC): Status: ACTIVE | Noted: 2023-01-01

## 2023-01-15 PROBLEM — E78.5 HYPERLIPEMIA: Status: ACTIVE | Noted: 2023-01-01

## 2023-01-15 PROBLEM — I71.40 ABDOMINAL AORTIC ANEURYSM (AAA) 3.0 CM TO 5.5 CM IN DIAMETER IN MALE (HCC): Status: ACTIVE | Noted: 2023-01-01

## 2023-01-15 PROBLEM — J44.9 COPD (CHRONIC OBSTRUCTIVE PULMONARY DISEASE) (HCC): Status: ACTIVE | Noted: 2023-01-15

## 2023-01-15 PROBLEM — F17.200 SMOKING: Status: ACTIVE | Noted: 2023-01-15

## 2023-01-16 ENCOUNTER — HOSPITAL ENCOUNTER (OUTPATIENT)
Dept: VASCULAR ULTRASOUND | Facility: HOSPITAL | Age: 63
Discharge: HOME | End: 2023-01-16

## 2023-01-16 ENCOUNTER — CONSULT (OUTPATIENT)
Dept: VASCULAR SURGERY | Facility: CLINIC | Age: 63
End: 2023-01-16

## 2023-01-16 VITALS
HEART RATE: 62 BPM | DIASTOLIC BLOOD PRESSURE: 92 MMHG | HEIGHT: 71 IN | SYSTOLIC BLOOD PRESSURE: 136 MMHG | BODY MASS INDEX: 27.44 KG/M2 | WEIGHT: 196 LBS | RESPIRATION RATE: 20 BRPM

## 2023-01-16 DIAGNOSIS — I73.9 PERIPHERAL ARTERY DISEASE (HCC): ICD-10-CM

## 2023-01-16 DIAGNOSIS — I74.09 AORTOILIAC OCCLUSIVE DISEASE (HCC): ICD-10-CM

## 2023-01-16 DIAGNOSIS — I70.229 ATHEROSCLEROSIS OF ARTERY OF EXTREMITY WITH REST PAIN (HCC): ICD-10-CM

## 2023-01-16 DIAGNOSIS — I71.40 ABDOMINAL AORTIC ANEURYSM (AAA) 3.0 CM TO 5.5 CM IN DIAMETER IN MALE: ICD-10-CM

## 2023-01-16 DIAGNOSIS — F17.200 SMOKING: Primary | ICD-10-CM

## 2023-01-16 DIAGNOSIS — I65.23 BILATERAL CAROTID ARTERY STENOSIS: ICD-10-CM

## 2023-01-16 DIAGNOSIS — E78.2 MIXED HYPERLIPIDEMIA: ICD-10-CM

## 2023-01-16 RX ORDER — EZETIMIBE 10 MG/1
10 TABLET ORAL DAILY
COMMUNITY
Start: 2023-01-12

## 2023-01-16 RX ORDER — BUSPIRONE HYDROCHLORIDE 10 MG/1
TABLET ORAL
COMMUNITY
Start: 2023-01-12

## 2023-01-16 RX ORDER — AMLODIPINE BESYLATE 5 MG/1
TABLET ORAL
COMMUNITY
Start: 2023-01-12

## 2023-01-16 RX ORDER — SIMVASTATIN 40 MG
TABLET ORAL
COMMUNITY
Start: 2023-01-12

## 2023-01-16 NOTE — LETTER
January 16, 2023     Patient: Clari Falcon  YOB: 1960  Date of Visit: 1/16/2023      To Whom it May Concern:    Светлана Campuzano is under my professional care  Yadi Cadena was seen in my office on 1/16/2023  Yadi Cadena will be out of work this week and re-evaluated next week in our office  If you have any questions or concerns, please don't hesitate to call           Sincerely,          Mau Schwartz PA-C        CC: No Recipients

## 2023-01-16 NOTE — PROGRESS NOTES
Assessment/Plan:    Atherosclerosis of artery of extremity with rest pain ([address] )  Aortoiliac occlusive disease ([address] )  Peripheral artery disease ([address] )  -     Ambulatory Referral to Vascular Surgery  -     VAS abdominal aorta/iliacs; complete study; Future  -     VAS lower limb arterial duplex, complete bilateral; Future  -     Comprehensive metabolic panel; Future  -     CTA abdominal w run off w wo contrast; Future  -     Lipid panel; Future  -HX L CF endarterectomy and L femoral to above knee popliteal bypass with nonreversed ipsilateral GSV (3/1/17, OSH)    -Numbness from the knee down on short-distance similar to pre-bypass  -Symptoms x 2 weeks? -Bypass occluded ? Duration unknown    Exam     No L femoral pulse  No L thigh bypass pulse or signal    No pulses in L foot  ? PT signal   L foot has rubor; warm; grossly motor-sensory intact  No wounds or acutely threatened      Plan:  -Atherosclerosis lower extremities with claudication +/-rest pain  -Patient with left leg bypass occluded (noted on LEV on 1/9)  -Smoking cessation strongly recommended  -We will send him for stat noninvasive studies  -Plan for CTA abdomen pelvis with runoff and short interval visit with vascular surgeon  -We discussed that should he have worsening symptoms  -such as increased leg/foot pain or numbness which does not resolve after short rest, he should go to the ED immediately        Abdominal aortic aneurysm (AAA) 3 0 cm to 5 5 cm in diameter in male  -AAA found on screening vascular imaging  -Asymptomatic  -No known family history of aneurysms    VAS screening 8/22/22:     -mild < 50% CHHAYA (R 43/17, L 78/30)    -abdominal aorta patent and aneurysmal 4 2 cm    -CHUY R 1 02, L 1 01    Plan:  -We discussed the natural history of aneurysms and the relationship of aneurysmal growth with continued smoking  -Smoking cessation strongly recommended  -Maintain good blood pressure and cholesterol control  -Continue with monitoring by duplex every 6 months      Bilateral carotid artery stenosis  -     VAS carotid complete study; Future  -Mild carotid artery stenosis by duplex screening  -Optimal medical therapy on aspirin and statin  -Smoking cessation  -Maintain good blood pressure and cholesterol control  Patient education regarding carotid disease was provided  -Follow-up carotid duplex in 2 years (August 2024)      Additional diagnoses:  Smoking    Mixed hyperlipidemia    Peripheral artery disease ([facility] [address] )  -     Ambulatory Referral to Vascular Surgery      Subjective:      Patient ID: Inocente Tolbert is a 58 y o  male  Patient is new to our practice and was referred by Dr Alex Cain  Pt was in [facility] on 1/9/23 for LLE numbness  Pt had a LEV on 1/9/23  Pt c/o LLE pain and numbness from knee to foot when walking  Pt has pain in the LLE at night  Pt denies open wounds  Pt states this started about 2 weeks ago  Pt smokes 1 1/2 ppd  HPI    Mr Khoa Yuan is a 58 yoM 1 5+PPD smoker, COPD, HLD, severe peripheral arterial disease, s/p Left femoral to above-knee popliteal bypass with nonreversed ipsilateral GSV 3/1/2017 [facility]) who is referred to vascular for evaluation  Patient reports development of "tingling" in the LEFT foot when walking for the past couple of weeks  If he walks longer distances 100' (possibly less) he gets tingling from the foot which radiates to the knee  A couple of weeks ago, he states that he was walking as much as he wanted to walk  He went to the ED last week on 1/9 with complaints of numbness  A venous duplex was checked and showed no DVT/SVT, but noted LEFT fem-pop bypass occlusion  Patient reports no worsening of foot numbness in the past week  After walking it takes several minutes for the numbness to settle  He has no "pain", No nighttime pain or discomfort keeping him awake  No foot coldness or wounds  Former patient at OSH but has not had vascular follow up in about 4 years  He continues to smoke    We discussed the importance of smoking cessation, as well as the deleterious effects of continued smoking including increased failure of peripheral procedures  Tingling in foot to the knee    Walking into the building >> tingling in the foot     2 months ago walking as much as he wanted to walk    Medical therapy includes aspirin and simvastatin 40          The following portions of the patient's history were reviewed and updated as appropriate: allergies, current medications, past family history, past medical history, past social history, past surgical history and problem list     Review of Systems   Constitutional: Negative  HENT: Negative  Eyes: Negative  Respiratory: Negative  Cardiovascular: Negative  Gastrointestinal: Negative  Endocrine: Negative  Genitourinary: Negative  Musculoskeletal: Negative for gait problem and neck stiffness  Leg pain when walking  Leg pain at night   Skin: Negative  Allergic/Immunologic: Negative  Neurological: Positive for numbness  Hematological: Negative  Psychiatric/Behavioral: Negative  Objective:      /92 (BP Location: Left arm, Patient Position: Sitting)   Pulse 62   Resp 20   Ht 5' 11" (1 803 m)   Wt 88 9 kg (196 lb)   BMI 27 34 kg/m²     No L femoral pulse  No pulses in L foot  ? PT signal     No L thigh bypass pulse or signal      L foot has rubor; warm; grossly motor-sensory intact  No wounds or acutely threatened  Physical Exam  Vitals and nursing note reviewed  Exam conducted with a chaperone present  Constitutional:       Appearance: He is well-developed  HENT:      Head: Normocephalic and atraumatic  Eyes:      Pupils: Pupils are equal, round, and reactive to light  Neck:      Thyroid: No thyromegaly  Vascular: No JVD  Trachea: Trachea normal    Cardiovascular:      Rate and Rhythm: Normal rate and regular rhythm        Pulses:           Carotid pulses are 2+ on the right side and 2+ on the left side  Radial pulses are 2+ on the right side and 2+ on the left side  Femoral pulses are 0 on the left side  Dorsalis pedis pulses are 1+ on the right side and 0 on the left side  Heart sounds: Normal heart sounds, S1 normal and S2 normal  No murmur heard  No friction rub  No gallop  Pulmonary:      Effort: Pulmonary effort is normal  No accessory muscle usage or respiratory distress  Breath sounds: Wheezing present  No rales  Abdominal:      General: Bowel sounds are normal  There is no distension  Palpations: Abdomen is soft  Tenderness: There is no abdominal tenderness  Musculoskeletal:         General: No deformity  Normal range of motion  Cervical back: Neck supple  Left lower leg: Edema (mild swelling) present  Skin:     General: Skin is warm and dry  Findings: No lesion or rash  Nails: There is no clubbing  Neurological:      Mental Status: He is alert and oriented to person, place, and time  Comments: Grossly normal    Psychiatric:         Behavior: Behavior is cooperative  LEV 1/9/23  [facility] REPORT  CLINICAL:  Indications: Patient presents with left lower extremity numbness and coldness  from the knee down for one day  Operative History:  2017-03-01 Left Fem-AK Pop BPG  Risk Factors  The patient has history of Hyperlipidemia, PAD and smoking (current) 1 5 ppd  CONCLUSION:  Impression:  RIGHT LOWER LIMB LIMITED:  Evaluation shows no evidence of thrombus in the common femoral vein  Doppler evaluation shows a normal response to augmentation maneuvers  The common femoral artery appears ectatic measuring approximately 1 6 cm  LEFT LOWER LIMB:  No evidence of acute or chronic deep vein thrombosis  No evidence of superficial thrombophlebitis noted  Doppler evaluation shows a normal response to augmentation maneuvers    Popliteal and posterior tibial arterial Doppler waveforms are severely dampened  and monophasic  Anterior tibial artery appears chronically occluded  The  femoral-above knee popliteal bypass graft appears occluded  The common femoral  artery appears aneurysmal measuring 3 1 x 2 9 cm  VAS screening 8/22/22  [facility] REPORT  CLINICAL:  Indications:  Vascular screening for Carotid artery stenosis, AAA and PAD  Patient reports a history of HTN and HLD  Patient reports a history of vascular intervention of the left leg at an  outside facility  Denies claudication or rest pain  Clinical  Right Pressure:  190/ mm Hg, Left Pressure:  190/ mm Hg  FINDINGS:     Unilate  Impression                 AP Diam (cm)    Aorta    3  Aortic aneurysm noted           4 2       Right        PSV  EDV  ICA/CCA  Pressure    PPS    Prox CCA     101   24                              DorsPedis                            152           Mid CCA       63   17                              Dist CCA      47   16                              Post Tibial                          194           Prox  ICA     28   13     0 45            62 50    Dist  ICA     43   17     0 69            62 50       Left         PSV  EDV  ICA/CCA  Pressure    PPS    Prox CCA      83   26                              Mid CCA       50   20                              Dist CCA      49   22                              Post Tibial                          192           Prox  ICA     78   30     1 55            50 40    Dist  ICA     25   56     0 50            50 40       CONCLUSION:  Impression  CAROTID SCREENING:  Evaluation reveals a <50% stenosis in the internal carotid artery on the right  Evaluation reveals a <50% stenosis in the internal carotid artery on the left  AORTA SCREENING:  The abdominal aorta is patent and aneurysmal with the greatest diameter  measurement of 4 2 cm  PAD SCREENING:  The ankle/brachial index on the right is 1 02 , which is within normal limits    The ankle/brachial index on the left is 1 01 , which is within normal limits  I have reviewed and made appropriate changes to the review of systems input by the medical assistant  Vitals:    01/16/23 1112   BP: 136/92   BP Location: Left arm   Patient Position: Sitting   Pulse: 62   Resp: 20   Weight: 88 9 kg (196 lb)   Height: 5' 11" (1 803 m)       Patient Active Problem List   Diagnosis   • Abdominal aortic aneurysm (AAA) 3 0 cm to 5 5 cm in diameter in male   • Peripheral arterial disease (HCC)   • Smoking   • Hyperlipemia   • COPD (chronic obstructive pulmonary disease) (HCC)       No past surgical history on file  No family history on file      Social History     Socioeconomic History   • Marital status: Single     Spouse name: Not on file   • Number of children: Not on file   • Years of education: Not on file   • Highest education level: Not on file   Occupational History   • Not on file   Tobacco Use   • Smoking status: Every Day     Packs/day: 1 50     Types: Cigarettes   • Smokeless tobacco: Current   Vaping Use   • Vaping Use: Never used   Substance and Sexual Activity   • Alcohol use: Not Currently   • Drug use: Never   • Sexual activity: Not on file   Other Topics Concern   • Not on file   Social History Narrative   • Not on file     Social Determinants of Health     Financial Resource Strain: Not on file   Food Insecurity: Not on file   Transportation Needs: Not on file   Physical Activity: Not on file   Stress: Not on file   Social Connections: Not on file   Intimate Partner Violence: Not on file   Housing Stability: Not on file       No Known Allergies      Current Outpatient Medications:   •  albuterol (PROVENTIL HFA,VENTOLIN HFA) 90 mcg/act inhaler, Inhale 2 puffs every 4 (four) hours as needed for wheezing, Disp: 1 Inhaler, Rfl: 0  •  amLODIPine (NORVASC) 5 mg tablet, , Disp: , Rfl:   •  aspirin 81 mg chewable tablet, Chew 81 mg daily, Disp: , Rfl:   •  Cholecalciferol 25 MCG (1000 UT) tablet, Take 1,000 Units by mouth daily, Disp: , Rfl:   •  ezetimibe (ZETIA) 10 mg tablet, Take 10 mg by mouth daily, Disp: , Rfl:   •  fluticasone-salmeterol (Advair) 250-50 mcg/dose inhaler, Inhale 1 puff 2 (two) times a day, Disp: , Rfl:   •  metoprolol tartrate (LOPRESSOR) 25 mg tablet, , Disp: , Rfl:   •  simvastatin (ZOCOR) 40 mg tablet, TAKE 1 TABLET BY MOUTH EVERY DAY IN THE EVENING FOR 90 DAYS, Disp: , Rfl:   •  busPIRone (BUSPAR) 10 mg tablet, , Disp: , Rfl:

## 2023-01-20 ENCOUNTER — HOSPITAL ENCOUNTER (OUTPATIENT)
Dept: CT IMAGING | Facility: HOSPITAL | Age: 63
End: 2023-01-20

## 2023-01-20 DIAGNOSIS — I70.229 ATHEROSCLEROSIS OF ARTERY OF EXTREMITY WITH REST PAIN (HCC): ICD-10-CM

## 2023-01-20 DIAGNOSIS — I74.09 AORTOILIAC OCCLUSIVE DISEASE (HCC): ICD-10-CM

## 2023-01-20 DIAGNOSIS — I71.40 ABDOMINAL AORTIC ANEURYSM (AAA) 3.0 CM TO 5.5 CM IN DIAMETER IN MALE: ICD-10-CM

## 2023-01-20 DIAGNOSIS — I73.9 PERIPHERAL ARTERY DISEASE (HCC): ICD-10-CM

## 2023-01-20 RX ADMIN — IOHEXOL 100 ML: 350 INJECTION, SOLUTION INTRAVENOUS at 15:52

## 2023-01-23 ENCOUNTER — TELEPHONE (OUTPATIENT)
Dept: VASCULAR SURGERY | Facility: CLINIC | Age: 63
End: 2023-01-23

## 2023-01-23 NOTE — TELEPHONE ENCOUNTER
Received call from Elton Lofton at [facility] radiology to report that there are significant findings on the CTA abd w/ runoff from 1/20  Per impression,   "VASCULAR:  Infrarenal abdominal aortic aneurysm measures 43 x 38 mm with eccentric mural thrombus      LEFT LOWER EXTREMITY:  1  Both the left femoral-popliteal bypass graft and native superficial femoral artery are occluded  The left femoral endarterectomy site is also occluded  2   Mild aneurysmal dilatation of the P2 segment with near complete chronic thrombotic occlusion  3   Aneurysmal dilatation of both the terminal left common iliac and proximal left internal iliac arteries      RIGHT LOWER EXTREMITY:  Moderate atherosclerotic narrowing at the adductor canal   Severe calcific narrowing at the terminal P3 segment and tibioperoneal trunk "    Pt has OV w/ Dr Pugh Redo 1/25  Please advise if anything further is needed

## 2023-01-23 NOTE — TELEPHONE ENCOUNTER
Reviewed  Recommend patient go to [facility] for vascular evaluation and possible intervention for occluded bypass graft

## 2023-01-23 NOTE — TELEPHONE ENCOUNTER
Called pt and informed him of recommendation  He states he will try to go later today but might not be able to get there until tomorrow morning

## 2023-01-25 ENCOUNTER — OFFICE VISIT (OUTPATIENT)
Dept: VASCULAR SURGERY | Facility: CLINIC | Age: 63
End: 2023-01-25

## 2023-01-25 VITALS
BODY MASS INDEX: 27.16 KG/M2 | HEIGHT: 71 IN | RESPIRATION RATE: 22 BRPM | HEART RATE: 62 BPM | DIASTOLIC BLOOD PRESSURE: 90 MMHG | WEIGHT: 194 LBS | SYSTOLIC BLOOD PRESSURE: 152 MMHG

## 2023-01-25 DIAGNOSIS — I71.40 ABDOMINAL AORTIC ANEURYSM (AAA) 3.0 CM TO 5.5 CM IN DIAMETER IN MALE: ICD-10-CM

## 2023-01-25 DIAGNOSIS — E78.2 MIXED HYPERLIPIDEMIA: ICD-10-CM

## 2023-01-25 DIAGNOSIS — I70.229 ATHEROSCLEROSIS OF ARTERY OF EXTREMITY WITH REST PAIN (HCC): Primary | ICD-10-CM

## 2023-01-25 DIAGNOSIS — Z72.0 TOBACCO ABUSE: ICD-10-CM

## 2023-01-25 NOTE — PROGRESS NOTES
Assessment/Plan:    Pt is a 60 yo M w/ COPD, HLD, HTN, AAA, PAD s/p L fem endart and fem-AK pop bypass w/ non-reversed GSV Ju '17, presents w/ LLE rest pain and occluded bypass    Atherosclerosis of artery of extremity with rest pain (HCC)  -     VAS lower limb vein mapping bypass graft; Future  -     VAS upper limb vein mapping; Future  -     Ambulatory Referral to Cardiology;  Future  -     Ambulatory Referral to Smoking Cessation Program; Future  -Left common femoral endarterectomy, left femoral to above-knee popliteal artery bypass with non-reversed GSV, 3/1/17, Dr Freya Coates  -reviewed CTA which shows 4 3cm AAA, 2 4cm L IIA aneurysm, 2 7cm L CFA aneurysm, 1 3cm L popliteal aneurysm; the fem-AK pop bypass is occluded; the L CFA and aneurysm are occluded; the profunda recons at the first branch point; the BK popliteal has significant occlusive disease; there is runoff w/ best target the PT but it is small; does not have L GSV  -discussed options at this point; he needs redo L femoral endarterectomy because of CFA occlusion and aneurysm and would also benefit from redo bypass; his BK pop is not a good target; best target would be PT  -will get vein mapping of RLE and BUE to look for conduit  -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[employer]  -will need cards eval; had stress ordered but never completed a couple years ago; will likely need preop stress  -f/u after DU complete    Abdominal aortic aneurysm (AAA) 3 0 cm to 5 5 cm in diameter in male  -reviewed CTA which shows 4 3cm AAA and 2 4 L IIA aneurysm  -will continue to monitor these w/ DU  -discussed relationship between smoking and aneurysms  -discussed need for screening testing of his siblings    Tobacco abuse  -current 1 5PPD smoker  -discussed in detail the relationship between smoking and PAD and aneurysmal disease and need for cessation; also discussed the patency of bypass is much decreased if he continues smoking and wound complications and infection are increased    Mixed hyperlipidemia  Medications  -continue ASA/statin    Subjective:      Patient ID: Lonnie Martin is a 61 y o  male  Patient had a CTA w/ runoff on 1/20/23  Pt c/o LLE pain at night and when walking  Pt c/o numbness and discoloration in the Lt foot  Pt denies open wounds  Pt is smokes 1 1/2 ppd  Pt is on ASA 81 mg and Simvastatin  HPI:    Patient was prior [facility] patient  He had a L fem-AK pop bypass at [facility] in '17  He presented to the ER 1/9/23 with new onset LLE numbness  He had a venous duplex which showed that his bypass graft was occluded, but the ER concluded that this wasn't an acute finding and discharged him with a recommendation for short interval vascular followup outpatient  He was seen by Leilani Pham about a week later and she ordered a CTA w/ runoff  He is here today to review this study and discuss options    Currently, he is having a feeling like his left foot fell asleep  This started about 3 weeks ago that his symptoms were severe but this might have started before this  He has not been able to go to work since then  He couldn't feel his foot in his work boots  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  He went to the ER 1/9/23  He is having short distance claudication currently  Prior to ER, he has an occasional tingle but no claudication  No family hx of aneurysm  Current smoker, 1 5PPD  Hx of illicit drug use in the 70s  Was a heavy EtOH user about 5 years ago  Takes ASA and simvastatin      The following portions of the patient's history were reviewed and updated as appropriate: allergies, current medications, past family history, past medical history, past social history, past surgical history and problem list     Review of Systems   Respiratory: Positive for shortness of breath  Cardiovascular: Negative for chest pain and leg swelling  Musculoskeletal: Positive for gait problem          Leg pain when walking  Leg pain at night   Skin: Positive for color change (toes)  Negative for wound  Neurological: Positive for numbness  Objective:      /90 (BP Location: Left arm, Patient Position: Sitting)   Pulse 62   Resp 22   Ht 5' 11" (1 803 m)   Wt 88 kg (194 lb)   BMI 27 06 kg/m²          Physical Exam  Cardiovascular:      Pulses:           Radial pulses are 2+ on the right side and 2+ on the left side  Dorsalis pedis pulses are 2+ on the right side and 0 on the left side  Posterior tibial pulses are 2+ on the right side and 0 on the left side  Comments: No carotid bruits  Musculoskeletal:      Right lower leg: No edema  Left lower leg: No edema  Skin:     Comments: No foot wound           I have reviewed and made appropriate changes to the review of systems input by the medical assistant      Vitals:    01/25/23 1030   BP: 152/90   BP Location: Left arm   Patient Position: Sitting   Pulse: 62   Resp: 22   Weight: 88 kg (194 lb)   Height: 5' 11" (1 803 m)       Patient Active Problem List   Diagnosis   • Abdominal aortic aneurysm (AAA) 3 0 cm to 5 5 cm in diameter in male   • Atherosclerosis of artery of extremity with rest pain (HCC)   • Smoking   • Hyperlipemia   • COPD (chronic obstructive pulmonary disease) (HCC)   • Aortoiliac occlusive disease (HCC)       Past Surgical History:   Procedure Laterality Date   • BYPASS FEMORAL-POPLITEAL Left 2017   • FEMORAL ENDARTARECTOMY Left 2017   • VASECTOMY         Family History   Problem Relation Age of Onset   • Heart disease Mother    • Heart attack Father        Social History     Socioeconomic History   • Marital status: Single     Spouse name: Not on file   • Number of children: Not on file   • Years of education: Not on file   • Highest education level: Not on file   Occupational History   • Not on file   Tobacco Use   • Smoking status: Every Day     Packs/day: 1 50     Types: Cigarettes   • Smokeless tobacco: Current Vaping Use   • Vaping Use: Never used   Substance and Sexual Activity   • Alcohol use: Not Currently   • Drug use: Never   • Sexual activity: Not on file   Other Topics Concern   • Not on file   Social History Narrative   • Not on file     Social Determinants of Health     Financial Resource Strain: Not on file   Food Insecurity: Not on file   Transportation Needs: Not on file   Physical Activity: Not on file   Stress: Not on file   Social Connections: Not on file   Intimate Partner Violence: Not on file   Housing Stability: Not on file       No Known Allergies      Current Outpatient Medications:   •  albuterol (PROVENTIL HFA,VENTOLIN HFA) 90 mcg/act inhaler, Inhale 2 puffs every 4 (four) hours as needed for wheezing, Disp: 1 Inhaler, Rfl: 0  •  amLODIPine (NORVASC) 5 mg tablet, , Disp: , Rfl:   •  aspirin 81 mg chewable tablet, Chew 81 mg daily, Disp: , Rfl:   •  Cholecalciferol 25 MCG (1000 UT) tablet, Take 1,000 Units by mouth daily, Disp: , Rfl:   •  ezetimibe (ZETIA) 10 mg tablet, Take 10 mg by mouth daily, Disp: , Rfl:   •  fluticasone-salmeterol (Advair) 250-50 mcg/dose inhaler, Inhale 1 puff 2 (two) times a day, Disp: , Rfl:   •  metoprolol tartrate (LOPRESSOR) 25 mg tablet, , Disp: , Rfl:   •  simvastatin (ZOCOR) 40 mg tablet, TAKE 1 TABLET BY MOUTH EVERY DAY IN THE EVENING FOR 90 DAYS, Disp: , Rfl:   •  busPIRone (BUSPAR) 10 mg tablet, , Disp: , Rfl:

## 2023-01-25 NOTE — PATIENT INSTRUCTIONS
1) PAD  -you have a blockage in the bypass as well as your artery in the groin area and several aneurysms  -we discussed two different surgeries to treat this, a smaller surgery and a larger surgery  -we are going to map your veins to see if there is a usable vein for a bypass    2) Medications  -please continue taking aspirin and statin medications    3) Tobacco  -smoking is the cause of blocked arteries and aneurysms  -I need you to stop smoking

## 2023-02-01 ENCOUNTER — HOSPITAL ENCOUNTER (OUTPATIENT)
Dept: VASCULAR ULTRASOUND | Facility: HOSPITAL | Age: 63
Discharge: HOME | End: 2023-02-01
Attending: SURGERY

## 2023-02-01 DIAGNOSIS — I70.229 ATHEROSCLEROSIS OF ARTERY OF EXTREMITY WITH REST PAIN (HCC): ICD-10-CM

## 2023-02-07 RX ORDER — FLUTICASONE PROPIONATE 50 MCG
SPRAY, SUSPENSION (ML) NASAL
COMMUNITY
Start: 2023-02-02

## 2023-02-07 RX ORDER — ERGOCALCIFEROL 1.25 MG/1
CAPSULE ORAL
COMMUNITY
Start: 2023-02-02

## 2023-02-09 ENCOUNTER — CONSULT (OUTPATIENT)
Dept: CARDIOLOGY CLINIC | Facility: CLINIC | Age: 63
End: 2023-02-09

## 2023-02-09 VITALS
SYSTOLIC BLOOD PRESSURE: 130 MMHG | BODY MASS INDEX: 27.16 KG/M2 | WEIGHT: 194 LBS | TEMPERATURE: 97.7 F | HEART RATE: 59 BPM | HEIGHT: 71 IN | DIASTOLIC BLOOD PRESSURE: 80 MMHG | OXYGEN SATURATION: 98 %

## 2023-02-09 DIAGNOSIS — E78.2 MIXED HYPERLIPIDEMIA: ICD-10-CM

## 2023-02-09 DIAGNOSIS — Z72.0 TOBACCO ABUSE: ICD-10-CM

## 2023-02-09 DIAGNOSIS — I70.229 ATHEROSCLEROSIS OF ARTERY OF EXTREMITY WITH REST PAIN (HCC): ICD-10-CM

## 2023-02-09 DIAGNOSIS — R06.09 DYSPNEA ON EXERTION: Primary | ICD-10-CM

## 2023-02-09 DIAGNOSIS — T82.898D OCCLUSION OF LEFT FEMOROPOPLITEAL BYPASS GRAFT, SUBSEQUENT ENCOUNTER: ICD-10-CM

## 2023-02-09 PROBLEM — T82.898A FEMORAL-POPLITEAL BYPASS GRAFT OCCLUSION, LEFT (HCC): Status: ACTIVE | Noted: 2023-01-01

## 2023-02-09 PROBLEM — I73.9 PERIPHERAL ARTERIAL DISEASE (HCC): Status: ACTIVE | Noted: 2023-02-09

## 2023-02-09 PROBLEM — I73.9 PERIPHERAL ARTERIAL DISEASE (HCC): Status: ACTIVE | Noted: 2023-01-01

## 2023-02-09 PROBLEM — T82.898A FEMORAL-POPLITEAL BYPASS GRAFT OCCLUSION, LEFT (HCC): Status: ACTIVE | Noted: 2023-02-09

## 2023-02-09 RX ORDER — VARENICLINE TARTRATE 25 MG
KIT ORAL
COMMUNITY
Start: 2023-02-01

## 2023-02-09 NOTE — PATIENT INSTRUCTIONS
You were seen today in the Cardiology office for pre operative risk assessment  Please have an echocardiogram performed and we will contact you with the results  Thank you for choosing [facility]  Please call our office or use [employer] with any questions

## 2023-02-09 NOTE — PROGRESS NOTES
[facility]    CHIEF COMPLAINT:   Chief Complaint   Patient presents with   • Consult       HPI:  Mccoy Sandifer is a 61 y o  male with a past medical history of current everyday smoker, hypertension, hyperlipidemia, peripheral vascular disease, status post left femoral to above-the-knee popliteal bypass with nonreversed ipsilateral GSV (March 2017), bilateral carotid artery stenosis, abdominal aortic aneurysm who presents today for preoperative risk assessment  Briefly, he presented on 1/9/2023 to [address] [place] [address] emergency department with complaints of left lower extremity pain and numbness while walking  Venous duplex was negative for DVT but noted a left fem-pop bypass occlusion  He has followed up with vascular surgery and tentative plans are for redo left femoral endarterectomy and possible redo bypass  He was referred preoperative risk assessment  He was ordered an exercise stress echo in Feb 2022 after presenting to the emergency department left upper quadrant abdominal pain x 3 days  His work up was unremarkable and he never completed the stress trest  Since that time he has continued to be quite active and his job requires a lot of walking and occasional lifting of heavy objects  He denies any exertional chest pain but does report shortness of breath   He is currently smoking about 1 pack per day and started smoking "as a child " Further denies any fever, chills, fatigue, new visual changes, lightheadedness, syncope, chest pain, palpitations, shortness of breath at rest, orthopnea, PND, nausea, vomiting, diarrhea, lower extremity swelling  +leg claudication    The following portions of the patient's history were reviewed and updated as appropriate: allergies, current medications, past family history, past medical history, past social history, past surgical history, and problem list     SINCE LAST OV I REVIEWED WITH THE PATIENT THE INTERIM LABS, TEST RESULTS, CONSULTANT(S) NOTES AND PERFORMED AN INTERIM REVIEW OF HISTORY    Past Medical History:   Diagnosis Date   • COPD (chronic obstructive pulmonary disease) ([facility] [address] )        Past Surgical History:   Procedure Laterality Date   • BYPASS FEMORAL-POPLITEAL Left 2017   • FEMORAL ENDARTARECTOMY Left 2017   • VASECTOMY         Social History     Socioeconomic History   • Marital status: Single     Spouse name: Not on file   • Number of children: Not on file   • Years of education: Not on file   • Highest education level: Not on file   Occupational History   • Not on file   Tobacco Use   • Smoking status: Every Day     Packs/day: 1 50     Types: Cigarettes   • Smokeless tobacco: Current   Vaping Use   • Vaping Use: Never used   Substance and Sexual Activity   • Alcohol use: Not Currently   • Drug use: Never   • Sexual activity: Not on file   Other Topics Concern   • Not on file   Social History Narrative   • Not on file     Social Determinants of Health     Financial Resource Strain: Not on file   Food Insecurity: Not on file   Transportation Needs: Not on file   Physical Activity: Not on file   Stress: Not on file   Social Connections: Not on file   Intimate Partner Violence: Not on file   Housing Stability: Not on file       Family History   Problem Relation Age of Onset   • Heart disease Mother    • Heart attack Father        No Known Allergies    Current Outpatient Medications   Medication Sig Dispense Refill   • albuterol (PROVENTIL HFA,VENTOLIN HFA) 90 mcg/act inhaler Inhale 2 puffs every 4 (four) hours as needed for wheezing 1 Inhaler 0   • amLODIPine (NORVASC) 5 mg tablet      • aspirin 81 mg chewable tablet Chew 81 mg daily     • Bempedoic Acid 180 MG TABS Take 1 tablet every day by oral route for 90 days       • busPIRone (BUSPAR) 10 mg tablet      • Cholecalciferol 25 MCG (1000 UT) tablet Take 1,000 Units by mouth daily     • ergocalciferol (ERGOCALCIFEROL) 1 25 MG (87001 UT) capsule Take 1 capsule every week by oral route for 90 days      • ezetimibe (ZETIA) 10 mg tablet Take 10 mg by mouth daily     • fluticasone (FLONASE) 50 mcg/act nasal spray Spray 1 spray every day by intranasal route for 30 days  • fluticasone-salmeterol (Advair) 250-50 mcg/dose inhaler Inhale 1 puff 2 (two) times a day     • metoprolol tartrate (LOPRESSOR) 25 mg tablet      • simvastatin (ZOCOR) 40 mg tablet TAKE 1 TABLET BY MOUTH EVERY DAY IN THE EVENING FOR 90 DAYS     • varenicline 0 5 MG X 11 & 1 MG X 42 tablet therapy pack TAKE 1 STARTR PK EVERY DAY BY ORAL ROUTE AFTER MEALS FOR 30 DAYS  No current facility-administered medications for this visit  /80 (BP Location: Left arm, Patient Position: Sitting, Cuff Size: Standard)   Pulse 59   Temp 97 7 °F (36 5 °C)   Ht 5' 11" (1 803 m)   Wt 88 kg (194 lb)   SpO2 98%   BMI 27 06 kg/m²     Review of Systems   All other systems reviewed and are negative  Physical Exam  Vitals reviewed  Constitutional:       General: He is not in acute distress  Appearance: He is well-developed  He is not toxic-appearing  HENT:      Head: Normocephalic and atraumatic  Eyes:      General: No scleral icterus  Extraocular Movements: Extraocular movements intact  Conjunctiva/sclera: Conjunctivae normal    Cardiovascular:      Rate and Rhythm: Normal rate and regular rhythm  Pulses: Normal pulses  Heart sounds: Normal heart sounds  No murmur heard  No gallop  Pulmonary:      Effort: Pulmonary effort is normal  No respiratory distress  Breath sounds: Normal breath sounds  No wheezing or rales  Abdominal:      General: Abdomen is flat  Bowel sounds are normal  There is no distension  Palpations: Abdomen is soft  Tenderness: There is no abdominal tenderness  Musculoskeletal:         General: No swelling or tenderness  Right lower leg: No edema  Left lower leg: No edema  Skin:     General: Skin is warm and dry        Capillary Refill: Capillary refill takes less than 2 seconds  Coloration: Skin is not jaundiced or pale  Neurological:      Mental Status: He is alert  Psychiatric:         Mood and Affect: Mood normal          Behavior: Behavior normal           Lab Results   Component Value Date    K 4 2 02/05/2022     02/05/2022    CO2 27 02/05/2022    BUN 17 02/05/2022    CREATININE 1 14 02/05/2022    CALCIUM 8 9 02/05/2022    ALT 19 02/05/2022    AST 18 02/05/2022       Lab Results   Component Value Date    WBC 5 41 02/05/2022    HGB 15 8 02/05/2022    HCT 48 3 02/05/2022     02/05/2022       Cardiac studies:   Results for orders placed or performed in visit on 02/09/23   POCT ECG    Impression    Normal sinus rhythm  Normal ECG       ASSESSMENT AND PLAN:  Blanca Rabago was seen today for consult  Diagnoses and all orders for this visit:    #  Dyspnea on exertion  -     Echo complete w/ contrast if indicated; Future  #  Tobacco abuse  #  Mixed hyperlipidemia  #  Occlusion of left femoropopliteal bypass graft, subsequent encounter: Continue aspirin and statin/ezetimibe/bempodoic acid  Repeat lipid panel is pending to assess if he is at goal    61year old male with the above mentioned past medical history who presents today for pre operative risk assessment for possible redo left femoral endarterectomy and redo bypass  Surgical date is to be determined  He denies any history of TIA/CVA or CHF  History of MI listed in his Care Everywhere but he denies this  Prior NM MPI in Feb 2017 reported normal myocardial perfusion  Renal function is mildly abnormal with eGFR 68  No history of diabetes  Current everyday smoker  He has remained active without any symptoms of chest pain or pressure  Given no active complaints a pre operative stress test is unlikely to  - he also seems resistant to the idea in general   His functional capacity is >4 METS    He denies chest pain but does report dyspnea on exertion which is likely related to underlying COPD  I did recommend an echocardiogram for baseline and he ultimately agreed        Recommendations:  -If there are no significant abnormalities on his echocardiogram, then no additional cardiac work-up is necessary prior to surgery as it will unlikely    -Continue statin and beta blocker throughout   -Advised the need for complete smoking cessation  -Will follow up on echo - arranged within 1 week    Juan Angulo MD

## 2023-02-15 ENCOUNTER — HOSPITAL ENCOUNTER (OUTPATIENT)
Dept: NON INVASIVE DIAGNOSTICS | Facility: HOSPITAL | Age: 63
Discharge: HOME | End: 2023-02-15
Attending: INTERNAL MEDICINE

## 2023-02-15 ENCOUNTER — OFFICE VISIT (OUTPATIENT)
Dept: VASCULAR SURGERY | Facility: CLINIC | Age: 63
End: 2023-02-15

## 2023-02-15 ENCOUNTER — TELEPHONE (OUTPATIENT)
Dept: VASCULAR SURGERY | Facility: CLINIC | Age: 63
End: 2023-02-15

## 2023-02-15 VITALS
SYSTOLIC BLOOD PRESSURE: 150 MMHG | DIASTOLIC BLOOD PRESSURE: 100 MMHG | HEART RATE: 74 BPM | WEIGHT: 197 LBS | BODY MASS INDEX: 27.58 KG/M2 | HEIGHT: 71 IN

## 2023-02-15 VITALS
HEIGHT: 71 IN | WEIGHT: 194 LBS | SYSTOLIC BLOOD PRESSURE: 130 MMHG | BODY MASS INDEX: 27.16 KG/M2 | HEART RATE: 59 BPM | DIASTOLIC BLOOD PRESSURE: 80 MMHG

## 2023-02-15 DIAGNOSIS — E78.2 MIXED HYPERLIPIDEMIA: ICD-10-CM

## 2023-02-15 DIAGNOSIS — Z72.0 TOBACCO ABUSE: ICD-10-CM

## 2023-02-15 DIAGNOSIS — I70.229 ATHEROSCLEROSIS OF ARTERY OF EXTREMITY WITH REST PAIN (HCC): Primary | ICD-10-CM

## 2023-02-15 DIAGNOSIS — R06.09 DYSPNEA ON EXERTION: ICD-10-CM

## 2023-02-15 DIAGNOSIS — T82.898D OCCLUSION OF LEFT FEMOROPOPLITEAL BYPASS GRAFT, SUBSEQUENT ENCOUNTER: ICD-10-CM

## 2023-02-15 DIAGNOSIS — I72.4 FEMORAL ARTERY ANEURYSM, LEFT (HCC): ICD-10-CM

## 2023-02-15 DIAGNOSIS — I71.40 ABDOMINAL AORTIC ANEURYSM (AAA) 3.0 CM TO 5.5 CM IN DIAMETER IN MALE: ICD-10-CM

## 2023-02-15 LAB
APICAL FOUR CHAMBER EJECTION FRACTION: 55 %
E WAVE DECELERATION TIME: 259 MS
LAAS-AP4: 19.9 CM2
LEFT VENTRICLE DIASTOLIC VOLUME (MOD BIPLANE): 140 ML
LEFT VENTRICLE SYSTOLIC VOLUME (MOD BIPLANE): 61 ML
LV EF: 56 %
MV E'TISSUE VEL-SEP: 5 CM/S
MV PEAK A VEL: 0.76 M/S
MV PEAK E VEL: 37 CM/S
MV STENOSIS PRESSURE HALF TIME: 75 MS
MV VALVE AREA P 1/2 METHOD: 2.93 CM2
RA PRESSURE ESTIMATED: 5 MMHG
RIGHT ATRIAL 2D VOLUME: 46 ML
RIGHT ATRIUM AREA SYSTOLE A4C: 17.7 CM2
RIGHT VENTRICLE ID DIMENSION: 3.8 CM
SL CV LV EF: 55
TRICUSPID ANNULAR PLANE SYSTOLIC EXCURSION: 2.9 CM

## 2023-02-15 RX ORDER — CHLORHEXIDINE GLUCONATE 0.12 MG/ML
15 RINSE ORAL ONCE
OUTPATIENT
Start: 2023-02-15 | End: 2023-02-15

## 2023-02-15 RX ORDER — FLUTICASONE FUROATE, UMECLIDINIUM BROMIDE AND VILANTEROL TRIFENATATE 100; 62.5; 25 UG/1; UG/1; UG/1
POWDER RESPIRATORY (INHALATION)
COMMUNITY
Start: 2023-02-12 | End: 2023-02-24

## 2023-02-15 RX ORDER — SODIUM CHLORIDE 9 MG/ML
125 INJECTION, SOLUTION INTRAVENOUS CONTINUOUS
OUTPATIENT
Start: 2023-02-15

## 2023-02-15 RX ORDER — CHLORHEXIDINE GLUCONATE 0.12 MG/ML
15 RINSE ORAL EVERY 12 HOURS SCHEDULED
OUTPATIENT
Start: 2023-02-15

## 2023-02-15 NOTE — TELEPHONE ENCOUNTER
REMINDER: Under Reason For Call, comments MUST be formatted as:   (Surgeon's Initials) / (Procedure)      Special Instructions / FYI:     Procedure: redo left femoral endarterectomy and interposition graft; possible angiogram    Level: 3 - Route clearance(s) to [facility] Clearance Pool     Allergies: Patient has no known allergies  Instructions Given: NO Bowel Prep General Instructions     Dialysis: Patient is not on dialysis  Return Visit Required Prior to Procedure: No     Consent: I certify that patient has signed, printed, timed, and dated their surgery consent  I certify that the patient's LEGAL NAME and DATE OF BIRTH are written in the upper left corner on BOTH sides of the consent  I certify that BOTH sides of the completed surgery consent have been scanned into the patient's Epic chart by myself on 2/15/2023  Yes, I have LABELED the consent in Epic as Consent for Vascular Procedure  For Surgical Clearances     Levels   1-3   ROUTE this encounter to [facility] Clearance Pool (AND)   [facility] Surgery Coordinator Pool     Level   4   ROUTE this encounter to [facility] Surgery Coordinator Pool       HYDRATION CLEARANCES   ONLY ROUTE TO  [facility] Clearance Pool     (1) ONE CLEARANCE NEEDED - Cardiology  Clearance // Clearing Provider's Name (Jonathan Singletary): Amy Matos //  Spoke with: Patient already being worked up  //  [employer] P: [phone] - F: Via Epic    Yes, I have ROUTED this encounter to [facility] Surgery Coordinator and/or [facility] Clearance Pool

## 2023-02-15 NOTE — H&P (VIEW-ONLY)
Assessment/Plan:    Pt is a 60 yo M w/ COPD, HLD, HTN, AAA, PAD s/p L fem endart and fem-AK pop bypass w/ non-reversed GSV Ju '17, presents w/ LLE rest pain and occluded bypass    Atherosclerosis of artery of extremity with rest pain (HCC)  Occlusion of left femoropopliteal bypass graft, subsequent encounter  Femoral artery aneurysm, left ([address] )  -     Case request operating room: L femoral endarterectomy and interposition graft; possible angiogram; Standing  -     Basic metabolic panel; Future  -     CBC and Platelet; Future  -     Type and screen; Future  -     Protime-INR;  Future  -     Case request operating room: L femoral endarterectomy and interposition graft; possible angiogram  -Left common femoral endarterectomy, left femoral to above-knee popliteal artery bypass with non-reversed GSV, 3/1/17, Dr Vannesa Leyva  -reviewed CTA which shows 4 3cm AAA, 2 4cm L IIA aneurysm, 2 7cm L CFA aneurysm, 1 3cm L popliteal aneurysm; the fem-AK pop bypass is occluded; the L CFA and aneurysm are occluded; the profunda recons at the first branch point; the BK popliteal has significant occlusive disease; there is runoff w/ best target the PT but it is small; does not have L GSV  -discussed options at this point; he needs redo L femoral endarterectomy because of CFA occlusion and aneurysm and would also benefit from redo bypass; his BK pop is not a good target; best target would be PT; reviewed his vein mapping which shows R GSV from groin to prox calf but not long enough for single piece bypass; has adequate R cephalic throughout; in order to perform bypass, he would need spliced vein from multiple harvest sites (R leg and R arm); this increases the risk significantly; discussed this with patient; discussed that treating the L groin alone would likely relieve his rest pain but some claudication would remain; all questions answered; risks and benefits discussed in detail including risk of wound complications at redo site; patient is going to proceed with groin portion only  -plan for L femoral endarterectomy and interposition graft to profunda for aneurysm  -labs, cards clearance  -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  -saw cards and TTE performed this AM; formal read pending; EF: 55%; will get final recommendations prior to surgery    Abdominal aortic aneurysm (AAA) 3 0 cm to 5 5 cm in diameter in male  -reviewed CTA which shows 4 3cm AAA and 2 4 L IIA aneurysm  -will continue to monitor these w/ DU  -discussed relationship between smoking and aneurysms  -discussed need for screening testing of his siblings    Tobacco abuse  -current 1/2PPD plus vaping (was 1 5PPD smoker last visit)  -discussed in detail the relationship between smoking and PAD and aneurysmal disease and increased risk of bypass thrombosis and need for cessation; also discussed the patency of bypass is much decreased if he continues smoking and wound complications and infection are increased    Mixed hyperlipidemia  Medications  -continue ASA/statin    Other orders  -     Trelegy Ellipta 100-62 5-25 MCG/ACT inhaler  -     Diet NPO; Sips with meds; Standing  -     Void on call to OR; Standing  -     Insert peripheral IV; Standing  -     Nursing Communication CHG bath, have staff wash entire body (neck down) per pre op bathing protocol  Routine, evening prior to, and day of surgery ; Standing  -     Nursing Communication Swab both nares with Povidone-Iodine solution, EXCLUDE if patient has shellfish/Iodine allergy, and replace with nasal alcohol swabstick  Routine, day of surgery, on call to OR ; Standing  -     chlorhexidine (PERIDEX) 0 12 % oral rinse 15 mL  -     Place sequential compression device; Standing  -     Shower/scrub; Standing  -     Nursing communication Clip hair (do not shave) prior to surgery; Standing  -     Insert urinary catheter (In Operative Area Only);  Standing  -     sodium chloride 0 9 % infusion  -     chlorhexidine (PERIDEX) 0 12 % oral rinse 15 mL  -     ceFAZolin (ANCEF) 2,000 mg in dextrose 5 % 100 mL IVPB        Operative Scheduling Information:    Hospital:  [facility]    Physician:  Me and Fellow    Surgery: redo left femoral endarterectomy and interposition graft; possible angiogram    Urgency:  Standard    Level:  Level 3: Outpatients to be scheduled for elective surgery than can be delayed up to 4 weeks without reasonable expectation of detriment to patient    Case Length:  5 hours    Post-op Bed:  Stepdown    OR Table:  Stille with C-arm    Equipment Needs:  None    Medication Instructions:  Aspirin:   Continue (do not hold)    Hydration:  No    Contrast Allergy:  no      Subjective:      Patient ID: Hays Baumgarten is a 61 y o  male  Patient present to review VM done on 2/1/23  Patient want to discuss surgical options  Patient c/o pain on BLE when walking for a few yards also cramping once in a while  Patient denies any open wounds or burning  Patient is currently taking ASA and Simvastatin  Patient smokes    HPI:    Patient was prior [facility] patient  He had a L fem-AK pop bypass at [facility] in '17  He presented to the ER 1/9/23 with new onset LLE numbness  He had a venous duplex which showed that his bypass graft was occluded, but the ER concluded that this wasn't an acute finding and discharged him with a recommendation for short interval vascular followup outpatient  He was seen by Janette Sierra about a week later and she ordered a CTA w/ runoff  After this, we discussed possible revascularization options and he went for vein mapping and cards assessment  Returns today to review thsi testing and make a final decision regarding surgery  Currently, he is having LLE claudication with ambulation <1/2block  He has pain in his foot sometimes at night and some numbness  He also feels this in the car but reports that turning the heat up on his legs resolves this  He has not been able to go to work since prior to ER visit    He couldn't feel his foot in his work boots  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  He went to the ER 1/9/23  Prior to ER, he has an occasional tingle but no claudication  No family hx of aneurysm  Current smoker, 1/2PPD (was 1 5PPD)  However, he started vaping and doesn't know how much nicotine is in this  Hx of illicit drug use in the 70s  Was a heavy EtOH user about 5 years ago  Takes ASA and simvastatin      The following portions of the patient's history were reviewed and updated as appropriate: allergies, current medications, past family history, past medical history, past social history, past surgical history and problem list     Review of Systems   Constitutional: Negative  HENT: Negative  Eyes: Negative  Respiratory: Positive for shortness of breath  Cardiovascular: Negative  Negative for chest pain and leg swelling  Gastrointestinal: Negative  Endocrine: Negative  Genitourinary: Negative  Musculoskeletal: Positive for gait problem (claudication)  Skin: Negative  Negative for wound  Allergic/Immunologic: Negative  Neurological: Positive for numbness  Negative for weakness  Hematological: Negative  Psychiatric/Behavioral: Negative  Objective:      /100 (BP Location: Left arm, Patient Position: Sitting, Cuff Size: Adult)   Pulse 74   Ht 5' 11" (1 803 m)   Wt 89 4 kg (197 lb)   BMI 27 48 kg/m²          Physical Exam  Cardiovascular:      Pulses:           Radial pulses are 2+ on the right side and 2+ on the left side  Dorsalis pedis pulses are 2+ on the right side and 0 on the left side  Posterior tibial pulses are 2+ on the right side and 0 on the left side  Heart sounds: No murmur heard  Comments: No carotid bruits  Pulmonary:      Effort: No respiratory distress  Breath sounds: No wheezing or rales  Musculoskeletal:      Right lower leg: No edema  Left lower leg: No edema  Skin:     Comments: No foot wound           I have reviewed and made appropriate changes to the review of systems input by the medical assistant      Vitals:    02/15/23 0929   BP: 150/100   BP Location: Left arm   Patient Position: Sitting   Cuff Size: Adult   Pulse: 74   Weight: 89 4 kg (197 lb)   Height: 5' 11" (1 803 m)       Patient Active Problem List   Diagnosis   • Abdominal aortic aneurysm (AAA) 3 0 cm to 5 5 cm in diameter in male   • Atherosclerosis of artery of extremity with rest pain ([facility])   • Tobacco abuse   • Hyperlipemia   • COPD (chronic obstructive pulmonary disease) ([facility])   • Peripheral arterial disease ([facility])   • Femoral-popliteal bypass graft occlusion, left ([facility])       Past Surgical History:   Procedure Laterality Date   • BYPASS FEMORAL-POPLITEAL Left 2017   • FEMORAL ENDARTARECTOMY Left 2017   • VASECTOMY         Family History   Problem Relation Age of Onset   • Heart disease Mother    • Heart attack Father        Social History     Socioeconomic History   • Marital status: Single     Spouse name: Not on file   • Number of children: Not on file   • Years of education: Not on file   • Highest education level: Not on file   Occupational History   • Not on file   Tobacco Use   • Smoking status: Every Day     Packs/day: 1 50     Types: Cigarettes   • Smokeless tobacco: Current   Vaping Use   • Vaping Use: Never used   Substance and Sexual Activity   • Alcohol use: Not Currently   • Drug use: Never   • Sexual activity: Not on file   Other Topics Concern   • Not on file   Social History Narrative   • Not on file     Social Determinants of Health     Financial Resource Strain: Not on file   Food Insecurity: Not on file   Transportation Needs: Not on file   Physical Activity: Not on file   Stress: Not on file   Social Connections: Not on file   Intimate Partner Violence: Not on file   Housing Stability: Not on file       No Known Allergies      Current Outpatient Medications:   •  albuterol (PROVENTIL HFA,VENTOLIN HFA) 90 mcg/act inhaler, Inhale 2 puffs every 4 (four) hours as needed for wheezing, Disp: 1 Inhaler, Rfl: 0  •  amLODIPine (NORVASC) 5 mg tablet, , Disp: , Rfl:   •  aspirin 81 mg chewable tablet, Chew 81 mg daily, Disp: , Rfl:   •  Bempedoic Acid 180 MG TABS, Take 1 tablet every day by oral route for 90 days  , Disp: , Rfl:   •  busPIRone (BUSPAR) 10 mg tablet, , Disp: , Rfl:   •  Cholecalciferol 25 MCG (1000 UT) tablet, Take 1,000 Units by mouth daily, Disp: , Rfl:   •  ergocalciferol (ERGOCALCIFEROL) 1 25 MG (16415 UT) capsule, Take 1 capsule every week by oral route for 90 days  , Disp: , Rfl:   •  ezetimibe (ZETIA) 10 mg tablet, Take 10 mg by mouth daily, Disp: , Rfl:   •  fluticasone (FLONASE) 50 mcg/act nasal spray, Spray 1 spray every day by intranasal route for 30 days  , Disp: , Rfl:   •  fluticasone-salmeterol (Advair) 250-50 mcg/dose inhaler, Inhale 1 puff 2 (two) times a day, Disp: , Rfl:   •  metoprolol tartrate (LOPRESSOR) 25 mg tablet, , Disp: , Rfl:   •  simvastatin (ZOCOR) 40 mg tablet, TAKE 1 TABLET BY MOUTH EVERY DAY IN THE EVENING FOR 90 DAYS, Disp: , Rfl:   •  varenicline 0 5 MG X 11 & 1 MG X 42 tablet therapy pack, TAKE 1 STARTR PK EVERY DAY BY ORAL ROUTE AFTER MEALS FOR 30 DAYS , Disp: , Rfl:   •  Trelegy Ellipta 100-62 5-25 MCG/ACT inhaler, , Disp: , Rfl:

## 2023-02-15 NOTE — PROGRESS NOTES
Assessment/Plan:    Pt is a 60 yo M w/ COPD, HLD, HTN, AAA, PAD s/p L fem endart and fem-AK pop bypass w/ non-reversed GSV Ju '17, presents w/ LLE rest pain and occluded bypass    Atherosclerosis of artery of extremity with rest pain (HCC)  Occlusion of left femoropopliteal bypass graft, subsequent encounter  Femoral artery aneurysm, left ([address] )  -     Case request operating room: L femoral endarterectomy and interposition graft; possible angiogram; Standing  -     Basic metabolic panel; Future  -     CBC and Platelet; Future  -     Type and screen; Future  -     Protime-INR;  Future  -     Case request operating room: L femoral endarterectomy and interposition graft; possible angiogram  -Left common femoral endarterectomy, left femoral to above-knee popliteal artery bypass with non-reversed GSV, 3/1/17, Dr Audry Lefort  -reviewed CTA which shows 4 3cm AAA, 2 4cm L IIA aneurysm, 2 7cm L CFA aneurysm, 1 3cm L popliteal aneurysm; the fem-AK pop bypass is occluded; the L CFA and aneurysm are occluded; the profunda recons at the first branch point; the BK popliteal has significant occlusive disease; there is runoff w/ best target the PT but it is small; does not have L GSV  -discussed options at this point; he needs redo L femoral endarterectomy because of CFA occlusion and aneurysm and would also benefit from redo bypass; his BK pop is not a good target; best target would be PT; reviewed his vein mapping which shows R GSV from groin to prox calf but not long enough for single piece bypass; has adequate R cephalic throughout; in order to perform bypass, he would need spliced vein from multiple harvest sites (R leg and R arm); this increases the risk significantly; discussed this with patient; discussed that treating the L groin alone would likely relieve his rest pain but some claudication would remain; all questions answered; risks and benefits discussed in detail including risk of wound complications at redo site; patient is going to proceed with groin portion only  -plan for L femoral endarterectomy and interposition graft to profunda for aneurysm  -labs, cards clearance  -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  -saw cards and TTE performed this AM; formal read pending; EF: 55%; will get final recommendations prior to surgery    Abdominal aortic aneurysm (AAA) 3 0 cm to 5 5 cm in diameter in male  -reviewed CTA which shows 4 3cm AAA and 2 4 L IIA aneurysm  -will continue to monitor these w/ DU  -discussed relationship between smoking and aneurysms  -discussed need for screening testing of his siblings    Tobacco abuse  -current 1/2PPD plus vaping (was 1 5PPD smoker last visit)  -discussed in detail the relationship between smoking and PAD and aneurysmal disease and increased risk of bypass thrombosis and need for cessation; also discussed the patency of bypass is much decreased if he continues smoking and wound complications and infection are increased    Mixed hyperlipidemia  Medications  -continue ASA/statin    Other orders  -     Trelegy Ellipta 100-62 5-25 MCG/ACT inhaler  -     Diet NPO; Sips with meds; Standing  -     Void on call to OR; Standing  -     Insert peripheral IV; Standing  -     Nursing Communication CHG bath, have staff wash entire body (neck down) per pre op bathing protocol  Routine, evening prior to, and day of surgery ; Standing  -     Nursing Communication Swab both nares with Povidone-Iodine solution, EXCLUDE if patient has shellfish/Iodine allergy, and replace with nasal alcohol swabstick  Routine, day of surgery, on call to OR ; Standing  -     chlorhexidine (PERIDEX) 0 12 % oral rinse 15 mL  -     Place sequential compression device; Standing  -     Shower/scrub; Standing  -     Nursing communication Clip hair (do not shave) prior to surgery; Standing  -     Insert urinary catheter (In Operative Area Only);  Standing  -     sodium chloride 0 9 % infusion  -     chlorhexidine (PERIDEX) 0 12 % oral rinse 15 mL  -     ceFAZolin (ANCEF) 2,000 mg in dextrose 5 % 100 mL IVPB        Operative Scheduling Information:    Hospital[facility]    Physician:  Me and Fellow    Surgery: redo left femoral endarterectomy and interposition graft; possible angiogram    Urgency:  Standard    Level:  Level 3: Outpatients to be scheduled for elective surgery than can be delayed up to 4 weeks without reasonable expectation of detriment to patient    Case Length:  5 hours    Post-op Bed:  Stepdown    OR Table:  Stille with C-arm    Equipment Needs:  None    Medication Instructions:  Aspirin:   Continue (do not hold)    Hydration:  No    Contrast Allergy:  no      Subjective:      Patient ID: Ana Maria Dee is a 61 y o  male  Patient present to review VM done on 2/1/23  Patient want to discuss surgical options  Patient c/o pain on BLE when walking for a few yards also cramping once in a while  Patient denies any open wounds or burning  Patient is currently taking ASA and Simvastatin  Patient smokes    HPI:    Patient was prior [facility] patient  He had a L fem-AK pop bypass at [facility] in '17  He presented to the ER 1/9/23 with new onset LLE numbness  He had a venous duplex which showed that his bypass graft was occluded, but the ER concluded that this wasn't an acute finding and discharged him with a recommendation for short interval vascular followup outpatient  He was seen by Palmer Sofia about a week later and she ordered a CTA w/ runoff  After this, we discussed possible revascularization options and he went for vein mapping and cards assessment  Returns today to review thsi testing and make a final decision regarding surgery  Currently, he is having LLE claudication with ambulation <1/2block  He has pain in his foot sometimes at night and some numbness  He also feels this in the car but reports that turning the heat up on his legs resolves this  He has not been able to go to work since prior to ER visit    He couldn't feel his foot in his work boots  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  He went to the ER 1/9/23  Prior to ER, he has an occasional tingle but no claudication  No family hx of aneurysm  Current smoker, 1/2PPD (was 1 5PPD)  However, he started vaping and doesn't know how much nicotine is in this  Hx of illicit drug use in the 70s  Was a heavy EtOH user about 5 years ago  Takes ASA and simvastatin      The following portions of the patient's history were reviewed and updated as appropriate: allergies, current medications, past family history, past medical history, past social history, past surgical history and problem list     Review of Systems   Constitutional: Negative  HENT: Negative  Eyes: Negative  Respiratory: Positive for shortness of breath  Cardiovascular: Negative  Negative for chest pain and leg swelling  Gastrointestinal: Negative  Endocrine: Negative  Genitourinary: Negative  Musculoskeletal: Positive for gait problem (claudication)  Skin: Negative  Negative for wound  Allergic/Immunologic: Negative  Neurological: Positive for numbness  Negative for weakness  Hematological: Negative  Psychiatric/Behavioral: Negative  Objective:      /100 (BP Location: Left arm, Patient Position: Sitting, Cuff Size: Adult)   Pulse 74   Ht 5' 11" (1 803 m)   Wt 89 4 kg (197 lb)   BMI 27 48 kg/m²          Physical Exam  Cardiovascular:      Pulses:           Radial pulses are 2+ on the right side and 2+ on the left side  Dorsalis pedis pulses are 2+ on the right side and 0 on the left side  Posterior tibial pulses are 2+ on the right side and 0 on the left side  Heart sounds: No murmur heard  Comments: No carotid bruits  Pulmonary:      Effort: No respiratory distress  Breath sounds: No wheezing or rales  Musculoskeletal:      Right lower leg: No edema  Left lower leg: No edema  Skin:     Comments: No foot wound           I have reviewed and made appropriate changes to the review of systems input by the medical assistant      Vitals:    02/15/23 0929   BP: 150/100   BP Location: Left arm   Patient Position: Sitting   Cuff Size: Adult   Pulse: 74   Weight: 89 4 kg (197 lb)   Height: 5' 11" (1 803 m)       Patient Active Problem List   Diagnosis   • Abdominal aortic aneurysm (AAA) 3 0 cm to 5 5 cm in diameter in male   • Atherosclerosis of artery of extremity with rest pain ([facility])   • Tobacco abuse   • Hyperlipemia   • COPD (chronic obstructive pulmonary disease) ([facility])   • Peripheral arterial disease ([facility])   • Femoral-popliteal bypass graft occlusion, left ([facility])       Past Surgical History:   Procedure Laterality Date   • BYPASS FEMORAL-POPLITEAL Left 2017   • FEMORAL ENDARTARECTOMY Left 2017   • VASECTOMY         Family History   Problem Relation Age of Onset   • Heart disease Mother    • Heart attack Father        Social History     Socioeconomic History   • Marital status: Single     Spouse name: Not on file   • Number of children: Not on file   • Years of education: Not on file   • Highest education level: Not on file   Occupational History   • Not on file   Tobacco Use   • Smoking status: Every Day     Packs/day: 1 50     Types: Cigarettes   • Smokeless tobacco: Current   Vaping Use   • Vaping Use: Never used   Substance and Sexual Activity   • Alcohol use: Not Currently   • Drug use: Never   • Sexual activity: Not on file   Other Topics Concern   • Not on file   Social History Narrative   • Not on file     Social Determinants of Health     Financial Resource Strain: Not on file   Food Insecurity: Not on file   Transportation Needs: Not on file   Physical Activity: Not on file   Stress: Not on file   Social Connections: Not on file   Intimate Partner Violence: Not on file   Housing Stability: Not on file       No Known Allergies      Current Outpatient Medications:   •  albuterol (PROVENTIL HFA,VENTOLIN HFA) 90 mcg/act inhaler, Inhale 2 puffs every 4 (four) hours as needed for wheezing, Disp: 1 Inhaler, Rfl: 0  •  amLODIPine (NORVASC) 5 mg tablet, , Disp: , Rfl:   •  aspirin 81 mg chewable tablet, Chew 81 mg daily, Disp: , Rfl:   •  Bempedoic Acid 180 MG TABS, Take 1 tablet every day by oral route for 90 days  , Disp: , Rfl:   •  busPIRone (BUSPAR) 10 mg tablet, , Disp: , Rfl:   •  Cholecalciferol 25 MCG (1000 UT) tablet, Take 1,000 Units by mouth daily, Disp: , Rfl:   •  ergocalciferol (ERGOCALCIFEROL) 1 25 MG (87717 UT) capsule, Take 1 capsule every week by oral route for 90 days  , Disp: , Rfl:   •  ezetimibe (ZETIA) 10 mg tablet, Take 10 mg by mouth daily, Disp: , Rfl:   •  fluticasone (FLONASE) 50 mcg/act nasal spray, Spray 1 spray every day by intranasal route for 30 days  , Disp: , Rfl:   •  fluticasone-salmeterol (Advair) 250-50 mcg/dose inhaler, Inhale 1 puff 2 (two) times a day, Disp: , Rfl:   •  metoprolol tartrate (LOPRESSOR) 25 mg tablet, , Disp: , Rfl:   •  simvastatin (ZOCOR) 40 mg tablet, TAKE 1 TABLET BY MOUTH EVERY DAY IN THE EVENING FOR 90 DAYS, Disp: , Rfl:   •  varenicline 0 5 MG X 11 & 1 MG X 42 tablet therapy pack, TAKE 1 STARTR PK EVERY DAY BY ORAL ROUTE AFTER MEALS FOR 30 DAYS , Disp: , Rfl:   •  Trelegy Ellipta 100-62 5-25 MCG/ACT inhaler, , Disp: , Rfl:

## 2023-02-15 NOTE — LETTER
02/15/23    Re: Cardiology  Clearance    Patient Name: Soo Guerra   Patient : 1960   Patient MRN: [id number]   Patient Phone: [phone]     Dr Edel Zhao Doctor, MD is requesting clearance for above patient, prior to proceeding with redo left femoral endarterectomy and interposition graft; possible angiogram   Patient's procedure will be scheduled at [place] [address] once clearance has been obtained  Patient will be given general anesthesia  Please fax your recommendations, including any medication recommendations, to [phone], attention nursing  Or route your recommendations to Epic pool [facility] Clearance Pool [[id number]]  Please reach out with any questions or concerns      Sincerely,    [facility]

## 2023-02-15 NOTE — PATIENT INSTRUCTIONS
1) PAD  -you have a blockage in the bypass as well as your artery in the groin area and several aneurysms  -we discussed two different surgeries to treat this, a smaller surgery and a larger surgery; you decided to go for the smaller surgery    2) Medications  -please continue taking aspirin and statin medications    3) Tobacco  -smoking is the cause of blocked arteries and aneurysms  -I need you to stop smoking!!! (Throw out the vape)

## 2023-02-17 NOTE — TELEPHONE ENCOUNTER
Patient called looking for a date for his surgery I told him that we are in the process of finding a date for him and once we have one we will call him back LLF

## 2023-02-20 ENCOUNTER — PREP FOR PROCEDURE (OUTPATIENT)
Dept: VASCULAR SURGERY | Facility: CLINIC | Age: 63
End: 2023-02-20

## 2023-02-20 NOTE — TELEPHONE ENCOUNTER
Authorization requirements reviewed  Please refer to Julia David / Nicholas Wally number [id number] for case updates

## 2023-02-20 NOTE — TELEPHONE ENCOUNTER
Verified patient's insurance   CONFIRMED - Patient's insurance is [employer]  Is patient requesting a call when authorization has been obtained? Patient did not request a call  Surgery Date: 3/3/23  Primary Surgeon: SAEED // DoctorTono (NPI: [id number])  Assisting Surgeon: Not Applicable (N/A)  Facility: [facility] (Tax: [id number] / NPI: [id number])  Inpatient / Outpatient: Inpatient  Level: 3    Clearance Received: Yes, Cardiology   Consent Received: Yes, scanned into Epic on 2/15/23  Medication Hold / Last Dose: no hold on aspirin  IR Notified: Not Applicable (N/A)  Rep  Notified: Not Applicable (N/A)  Equipment Needs: C-arm and Stille table  Vas Lab Requested: Not Applicable (N/A)  Patient Contacted: 2/20/23    Diagnosis: I70 [id number]  Procedure/ CPT Code(s): Angiogram // CPT: 50401 // Procedure to take place in OR [Auth/ Cert Based] and Common Femoral Endartectomy // CPT: 27410    For varicose vein related procedures:   Last LEVDR: Not Applicable (N/A)  CEAP Classification: Not Applicable (N/A)  VCSS: Not Applicable (N/A)    Post Operative Date/ Time: 3/14/23 , 830am Kyrie Daniels) with CHRISTIANO Archuleta (NPI: [id number])LRHELLENW with LMD in post op time frame     *Please review medication hold(s), PATs, and check H&P with patient  *  PATIENT WAS MAILED SURGERY/SHOWERING/DISCHARGE/COVID INSTRUCTIONS  Mailed patient post op card as well has discharge instructions  Patient will go to [facility] for labs  Patient understands surgery/showering instructions  AFTER REVIEWING WITH THEM VIA PHONE CALL

## 2023-02-24 ENCOUNTER — LAB REQUISITION (OUTPATIENT)
Dept: LAB | Facility: HOSPITAL | Age: 63
End: 2023-02-24

## 2023-02-24 ENCOUNTER — APPOINTMENT (OUTPATIENT)
Dept: LAB | Facility: CLINIC | Age: 63
End: 2023-02-24

## 2023-02-24 DIAGNOSIS — I70.229: ICD-10-CM

## 2023-02-24 DIAGNOSIS — I71.40 ABDOMINAL AORTIC ANEURYSM (AAA) 3.0 CM TO 5.5 CM IN DIAMETER IN MALE: ICD-10-CM

## 2023-02-24 DIAGNOSIS — I73.9 PERIPHERAL ARTERY DISEASE (HCC): ICD-10-CM

## 2023-02-24 DIAGNOSIS — I70.229 ATHEROSCLEROSIS OF ARTERY OF EXTREMITY WITH REST PAIN (HCC): ICD-10-CM

## 2023-02-24 DIAGNOSIS — I74.09 AORTOILIAC OCCLUSIVE DISEASE (HCC): ICD-10-CM

## 2023-02-24 LAB
ABO GROUP BLD: NORMAL
ABO GROUP BLD: NORMAL
ALBUMIN SERPL BCP-MCNC: 3.9 G/DL (ref 3.5–5)
ALP SERPL-CCNC: 59 U/L (ref 34–104)
ALT SERPL W P-5'-P-CCNC: 8 U/L (ref 7–52)
ANION GAP SERPL CALCULATED.3IONS-SCNC: 9 MMOL/L (ref 4–13)
AST SERPL W P-5'-P-CCNC: 12 U/L (ref 13–39)
BILIRUB SERPL-MCNC: 0.53 MG/DL (ref 0.2–1)
BLD GP AB SCN SERPL QL: NEGATIVE
BUN SERPL-MCNC: 23 MG/DL (ref 5–25)
CALCIUM SERPL-MCNC: 9.4 MG/DL (ref 8.4–10.2)
CHLORIDE SERPL-SCNC: 103 MMOL/L (ref 96–108)
CHOLEST SERPL-MCNC: 262 MG/DL
CO2 SERPL-SCNC: 25 MMOL/L (ref 21–32)
CREAT SERPL-MCNC: 1.05 MG/DL (ref 0.6–1.3)
ERYTHROCYTE [DISTWIDTH] IN BLOOD BY AUTOMATED COUNT: 13.9 % (ref 11.6–15.1)
GFR SERPL CREATININE-BSD FRML MDRD: 75 ML/MIN/1.73SQ M
GLUCOSE P FAST SERPL-MCNC: 86 MG/DL (ref 65–99)
HCT VFR BLD AUTO: 49 % (ref 36.5–49.3)
HDLC SERPL-MCNC: 37 MG/DL
HGB BLD-MCNC: 16.1 G/DL (ref 12–17)
INR PPP: 1.07 (ref 0.84–1.19)
LDLC SERPL CALC-MCNC: 207 MG/DL (ref 0–100)
MCH RBC QN AUTO: 29.5 PG (ref 26.8–34.3)
MCHC RBC AUTO-ENTMCNC: 32.9 G/DL (ref 31.4–37.4)
MCV RBC AUTO: 90 FL (ref 82–98)
NONHDLC SERPL-MCNC: 225 MG/DL
PLATELET # BLD AUTO: 216 THOUSANDS/UL (ref 149–390)
PMV BLD AUTO: 10.7 FL (ref 8.9–12.7)
POTASSIUM SERPL-SCNC: 4.4 MMOL/L (ref 3.5–5.3)
PROT SERPL-MCNC: 7.1 G/DL (ref 6.4–8.4)
PROTHROMBIN TIME: 14.1 SECONDS (ref 11.6–14.5)
RBC # BLD AUTO: 5.46 MILLION/UL (ref 3.88–5.62)
RH BLD: NEGATIVE
RH BLD: NEGATIVE
SODIUM SERPL-SCNC: 137 MMOL/L (ref 135–147)
SPECIMEN EXPIRATION DATE: NORMAL
TRIGL SERPL-MCNC: 91 MG/DL
WBC # BLD AUTO: 8.19 THOUSAND/UL (ref 4.31–10.16)

## 2023-02-24 RX ORDER — VARENICLINE TARTRATE 0.5 MG/1
0.5 TABLET, FILM COATED ORAL 2 TIMES DAILY
COMMUNITY

## 2023-02-24 RX ORDER — FLUTICASONE PROPIONATE AND SALMETEROL 250; 50 UG/1; UG/1
1 POWDER RESPIRATORY (INHALATION) 2 TIMES DAILY
COMMUNITY

## 2023-02-24 NOTE — PRE-PROCEDURE INSTRUCTIONS
Pre-Surgery Instructions:   Medication Instructions   • albuterol (PROVENTIL HFA,VENTOLIN HFA) 90 mcg/act inhaler Uses PRN- OK to take day of surgery   • aspirin 81 mg chewable tablet Instructions provided by MD   • ergocalciferol (ERGOCALCIFEROL) 1 25 MG (40521 UT) capsule Hold day of surgery  • ezetimibe (ZETIA) 10 mg tablet Take night before surgery   • Fluticasone-Salmeterol (Advair) 250-50 mcg/dose inhaler Take day of surgery  • metoprolol tartrate (LOPRESSOR) 25 mg tablet Take day of surgery  • simvastatin (ZOCOR) 40 mg tablet Take night before surgery    Pt denies fever, sob, sore throat and cough  Pt verbalized understanding of shower and med instructions  Pt instructed to continue aspirin prior to surgery per surgeon  Pt instructed to stop vitamins, motrin, aleve, advil and ibuprofen one week prior to surgery

## 2023-03-03 ENCOUNTER — ANESTHESIA (OUTPATIENT)
Dept: PERIOP | Facility: HOSPITAL | Age: 63
End: 2023-03-03

## 2023-03-03 ENCOUNTER — APPOINTMENT (OUTPATIENT)
Dept: RADIOLOGY | Facility: HOSPITAL | Age: 63
End: 2023-03-03

## 2023-03-03 ENCOUNTER — HOSPITAL ENCOUNTER (INPATIENT)
Facility: HOSPITAL | Age: 63
LOS: 1 days | Discharge: HOME | End: 2023-03-04
Attending: SURGERY | Admitting: SURGERY

## 2023-03-03 ENCOUNTER — ANESTHESIA EVENT (OUTPATIENT)
Dept: PERIOP | Facility: HOSPITAL | Age: 63
End: 2023-03-03

## 2023-03-03 DIAGNOSIS — I70.229 ATHEROSCLEROSIS OF ARTERY OF EXTREMITY WITH REST PAIN (HCC): Primary | ICD-10-CM

## 2023-03-03 LAB
KCT BLD-ACNC: 220 SEC (ref 89–137)
KCT BLD-ACNC: 265 SEC (ref 89–137)
SPECIMEN SOURCE: ABNORMAL
SPECIMEN SOURCE: ABNORMAL

## 2023-03-03 PROCEDURE — 04UL0JZ SUPPLEMENT LEFT FEMORAL ARTERY WITH SYNTHETIC SUBSTITUTE, OPEN APPROACH: ICD-10-PCS | Performed by: SURGERY

## 2023-03-03 DEVICE — PROPATEN VASCULAR GRAFT SW 4-7MMX45CM TAPERED HEPARIN
Type: IMPLANTABLE DEVICE | Site: ARTERIAL | Status: FUNCTIONAL
Brand: GORE PROPATEN VASCULAR GRAFT

## 2023-03-03 RX ORDER — PROPOFOL 10 MG/ML
INJECTION, EMULSION INTRAVENOUS AS NEEDED
Status: DISCONTINUED | OUTPATIENT
Start: 2023-03-03 | End: 2023-03-03

## 2023-03-03 RX ORDER — ONDANSETRON 2 MG/ML
4 INJECTION INTRAMUSCULAR; INTRAVENOUS ONCE AS NEEDED
Status: DISCONTINUED | OUTPATIENT
Start: 2023-03-03 | End: 2023-03-03 | Stop reason: HOSPADM

## 2023-03-03 RX ORDER — ALBUTEROL SULFATE 90 UG/1
AEROSOL, METERED RESPIRATORY (INHALATION) AS NEEDED
Status: DISCONTINUED | OUTPATIENT
Start: 2023-03-03 | End: 2023-03-03

## 2023-03-03 RX ORDER — MAGNESIUM HYDROXIDE 1200 MG/15ML
LIQUID ORAL AS NEEDED
Status: DISCONTINUED | OUTPATIENT
Start: 2023-03-03 | End: 2023-03-03 | Stop reason: HOSPADM

## 2023-03-03 RX ORDER — HYDROMORPHONE HCL/PF 1 MG/ML
0.4 SYRINGE (ML) INJECTION
Status: DISCONTINUED | OUTPATIENT
Start: 2023-03-03 | End: 2023-03-03 | Stop reason: HOSPADM

## 2023-03-03 RX ORDER — HEPARIN SODIUM 5000 [USP'U]/ML
5000 INJECTION, SOLUTION INTRAVENOUS; SUBCUTANEOUS EVERY 8 HOURS SCHEDULED
Status: DISCONTINUED | OUTPATIENT
Start: 2023-03-03 | End: 2023-03-04 | Stop reason: HOSPADM

## 2023-03-03 RX ORDER — MIDAZOLAM HYDROCHLORIDE 2 MG/2ML
INJECTION, SOLUTION INTRAMUSCULAR; INTRAVENOUS AS NEEDED
Status: DISCONTINUED | OUTPATIENT
Start: 2023-03-03 | End: 2023-03-03

## 2023-03-03 RX ORDER — CHLORHEXIDINE GLUCONATE 0.12 MG/ML
15 RINSE ORAL EVERY 12 HOURS SCHEDULED
Status: DISCONTINUED | OUTPATIENT
Start: 2023-03-03 | End: 2023-03-03

## 2023-03-03 RX ORDER — FENTANYL CITRATE 50 UG/ML
INJECTION, SOLUTION INTRAMUSCULAR; INTRAVENOUS AS NEEDED
Status: DISCONTINUED | OUTPATIENT
Start: 2023-03-03 | End: 2023-03-03

## 2023-03-03 RX ORDER — ALBUTEROL SULFATE 90 UG/1
2 AEROSOL, METERED RESPIRATORY (INHALATION) EVERY 4 HOURS PRN
Status: DISCONTINUED | OUTPATIENT
Start: 2023-03-03 | End: 2023-03-04 | Stop reason: HOSPADM

## 2023-03-03 RX ORDER — HEPARIN SODIUM 1000 [USP'U]/ML
INJECTION, SOLUTION INTRAVENOUS; SUBCUTANEOUS AS NEEDED
Status: DISCONTINUED | OUTPATIENT
Start: 2023-03-03 | End: 2023-03-03

## 2023-03-03 RX ORDER — RIFAMPIN 600 MG/10ML
300 INJECTION, POWDER, LYOPHILIZED, FOR SOLUTION INTRAVENOUS ONCE
Status: DISCONTINUED | OUTPATIENT
Start: 2023-03-03 | End: 2023-03-03

## 2023-03-03 RX ORDER — EPHEDRINE SULFATE 50 MG/ML
INJECTION INTRAVENOUS AS NEEDED
Status: DISCONTINUED | OUTPATIENT
Start: 2023-03-03 | End: 2023-03-03

## 2023-03-03 RX ORDER — LIDOCAINE HYDROCHLORIDE 10 MG/ML
INJECTION, SOLUTION EPIDURAL; INFILTRATION; INTRACAUDAL; PERINEURAL AS NEEDED
Status: DISCONTINUED | OUTPATIENT
Start: 2023-03-03 | End: 2023-03-03

## 2023-03-03 RX ORDER — SODIUM CHLORIDE, SODIUM LACTATE, POTASSIUM CHLORIDE, CALCIUM CHLORIDE 600; 310; 30; 20 MG/100ML; MG/100ML; MG/100ML; MG/100ML
INJECTION, SOLUTION INTRAVENOUS CONTINUOUS PRN
Status: DISCONTINUED | OUTPATIENT
Start: 2023-03-03 | End: 2023-03-03

## 2023-03-03 RX ORDER — ONDANSETRON 2 MG/ML
INJECTION INTRAMUSCULAR; INTRAVENOUS AS NEEDED
Status: DISCONTINUED | OUTPATIENT
Start: 2023-03-03 | End: 2023-03-03

## 2023-03-03 RX ORDER — CEFAZOLIN SODIUM 2 G/50ML
2000 SOLUTION INTRAVENOUS ONCE
Status: COMPLETED | OUTPATIENT
Start: 2023-03-03 | End: 2023-03-03

## 2023-03-03 RX ORDER — RIFAMPIN 600 MG/10ML
600 INJECTION, POWDER, LYOPHILIZED, FOR SOLUTION INTRAVENOUS ONCE
Status: COMPLETED | OUTPATIENT
Start: 2023-03-03 | End: 2023-03-03

## 2023-03-03 RX ORDER — METOPROLOL TARTRATE 5 MG/5ML
INJECTION INTRAVENOUS AS NEEDED
Status: DISCONTINUED | OUTPATIENT
Start: 2023-03-03 | End: 2023-03-03

## 2023-03-03 RX ORDER — SODIUM CHLORIDE 9 MG/ML
INJECTION, SOLUTION INTRAVENOUS CONTINUOUS PRN
Status: DISCONTINUED | OUTPATIENT
Start: 2023-03-03 | End: 2023-03-03

## 2023-03-03 RX ORDER — DEXAMETHASONE SODIUM PHOSPHATE 10 MG/ML
INJECTION, SOLUTION INTRAMUSCULAR; INTRAVENOUS AS NEEDED
Status: DISCONTINUED | OUTPATIENT
Start: 2023-03-03 | End: 2023-03-03

## 2023-03-03 RX ORDER — ROCURONIUM BROMIDE 10 MG/ML
INJECTION, SOLUTION INTRAVENOUS AS NEEDED
Status: DISCONTINUED | OUTPATIENT
Start: 2023-03-03 | End: 2023-03-03

## 2023-03-03 RX ORDER — SODIUM CHLORIDE 9 MG/ML
100 INJECTION, SOLUTION INTRAVENOUS CONTINUOUS
Status: DISCONTINUED | OUTPATIENT
Start: 2023-03-03 | End: 2023-03-04

## 2023-03-03 RX ORDER — LABETALOL HYDROCHLORIDE 5 MG/ML
5 INJECTION, SOLUTION INTRAVENOUS
Status: DISCONTINUED | OUTPATIENT
Start: 2023-03-03 | End: 2023-03-04 | Stop reason: HOSPADM

## 2023-03-03 RX ORDER — CHLORHEXIDINE GLUCONATE 0.12 MG/ML
15 RINSE ORAL ONCE
Status: COMPLETED | OUTPATIENT
Start: 2023-03-03 | End: 2023-03-03

## 2023-03-03 RX ORDER — HYDROMORPHONE HCL/PF 1 MG/ML
SYRINGE (ML) INJECTION AS NEEDED
Status: DISCONTINUED | OUTPATIENT
Start: 2023-03-03 | End: 2023-03-03

## 2023-03-03 RX ORDER — FENTANYL CITRATE/PF 50 MCG/ML
25 SYRINGE (ML) INJECTION
Status: DISCONTINUED | OUTPATIENT
Start: 2023-03-03 | End: 2023-03-03 | Stop reason: HOSPADM

## 2023-03-03 RX ADMIN — FENTANYL CITRATE 50 MCG: 50 INJECTION INTRAMUSCULAR; INTRAVENOUS at 10:19

## 2023-03-03 RX ADMIN — CEFAZOLIN SODIUM 2000 MG: 2 SOLUTION INTRAVENOUS at 09:43

## 2023-03-03 RX ADMIN — FENTANYL CITRATE 50 MCG: 50 INJECTION INTRAMUSCULAR; INTRAVENOUS at 11:04

## 2023-03-03 RX ADMIN — SUGAMMADEX 200 MG: 100 INJECTION, SOLUTION INTRAVENOUS at 13:25

## 2023-03-03 RX ADMIN — PROPOFOL 100 MG: 10 INJECTION, EMULSION INTRAVENOUS at 09:36

## 2023-03-03 RX ADMIN — LIDOCAINE HYDROCHLORIDE 50 MG: 10 INJECTION, SOLUTION EPIDURAL; INFILTRATION; INTRACAUDAL; PERINEURAL at 09:36

## 2023-03-03 RX ADMIN — EPHEDRINE SULFATE 5 MG: 50 INJECTION INTRAVENOUS at 13:09

## 2023-03-03 RX ADMIN — SODIUM CHLORIDE 125 ML/HR: 0.9 INJECTION, SOLUTION INTRAVENOUS at 08:07

## 2023-03-03 RX ADMIN — HEPARIN SODIUM 8000 UNITS: 1000 INJECTION INTRAVENOUS; SUBCUTANEOUS at 11:30

## 2023-03-03 RX ADMIN — PROPOFOL 40 MG: 10 INJECTION, EMULSION INTRAVENOUS at 09:38

## 2023-03-03 RX ADMIN — ROCURONIUM BROMIDE 10 MG: 50 INJECTION, SOLUTION INTRAVENOUS at 10:55

## 2023-03-03 RX ADMIN — EPHEDRINE SULFATE 5 MG: 50 INJECTION INTRAVENOUS at 10:12

## 2023-03-03 RX ADMIN — LABETALOL HYDROCHLORIDE 5 MG: 5 INJECTION, SOLUTION INTRAVENOUS at 22:34

## 2023-03-03 RX ADMIN — CHLORHEXIDINE GLUCONATE 15 ML: 1.2 SOLUTION ORAL at 07:36

## 2023-03-03 RX ADMIN — SODIUM CHLORIDE, SODIUM LACTATE, POTASSIUM CHLORIDE, AND CALCIUM CHLORIDE: .6; .31; .03; .02 INJECTION, SOLUTION INTRAVENOUS at 09:20

## 2023-03-03 RX ADMIN — PHENYLEPHRINE HYDROCHLORIDE 30 MCG/MIN: 10 INJECTION INTRAVENOUS at 09:50

## 2023-03-03 RX ADMIN — ROCURONIUM BROMIDE 30 MG: 50 INJECTION, SOLUTION INTRAVENOUS at 10:19

## 2023-03-03 RX ADMIN — METOROPROLOL TARTRATE 2 MG: 5 INJECTION, SOLUTION INTRAVENOUS at 09:58

## 2023-03-03 RX ADMIN — ALBUTEROL SULFATE 2 PUFF: 90 AEROSOL, METERED RESPIRATORY (INHALATION) at 09:30

## 2023-03-03 RX ADMIN — HEPARIN SODIUM 5000 UNITS: 5000 INJECTION INTRAVENOUS; SUBCUTANEOUS at 22:34

## 2023-03-03 RX ADMIN — SODIUM CHLORIDE 100 ML/HR: 0.9 INJECTION, SOLUTION INTRAVENOUS at 22:11

## 2023-03-03 RX ADMIN — ROCURONIUM BROMIDE 50 MG: 50 INJECTION, SOLUTION INTRAVENOUS at 09:36

## 2023-03-03 RX ADMIN — FENTANYL CITRATE 50 MCG: 50 INJECTION INTRAMUSCULAR; INTRAVENOUS at 09:36

## 2023-03-03 RX ADMIN — SODIUM CHLORIDE: 0.9 INJECTION, SOLUTION INTRAVENOUS at 09:46

## 2023-03-03 RX ADMIN — ONDANSETRON 4 MG: 2 INJECTION INTRAMUSCULAR; INTRAVENOUS at 13:25

## 2023-03-03 RX ADMIN — DEXAMETHASONE SODIUM PHOSPHATE 10 MG: 10 INJECTION, SOLUTION INTRAMUSCULAR; INTRAVENOUS at 09:37

## 2023-03-03 RX ADMIN — HYDROMORPHONE HYDROCHLORIDE 0.5 MG: 1 INJECTION, SOLUTION INTRAMUSCULAR; INTRAVENOUS; SUBCUTANEOUS at 13:04

## 2023-03-03 RX ADMIN — FENTANYL CITRATE 25 MCG: 50 INJECTION INTRAMUSCULAR; INTRAVENOUS at 11:17

## 2023-03-03 RX ADMIN — FENTANYL CITRATE 25 MCG: 50 INJECTION INTRAMUSCULAR; INTRAVENOUS at 11:42

## 2023-03-03 RX ADMIN — ROCURONIUM BROMIDE 10 MG: 50 INJECTION, SOLUTION INTRAVENOUS at 11:44

## 2023-03-03 RX ADMIN — PROPOFOL 40 MG: 10 INJECTION, EMULSION INTRAVENOUS at 13:36

## 2023-03-03 RX ADMIN — HYDROMORPHONE HYDROCHLORIDE 0.5 MG: 1 INJECTION, SOLUTION INTRAMUSCULAR; INTRAVENOUS; SUBCUTANEOUS at 12:02

## 2023-03-03 RX ADMIN — MIDAZOLAM 2 MG: 1 INJECTION INTRAMUSCULAR; INTRAVENOUS at 09:25

## 2023-03-03 NOTE — DISCHARGE INSTR - AVS FIRST PAGE
DISCHARGE INSTRUCTIONS  LEG/BYPASS SURGERY    ACTIVITY:   Limit your physical activity to walking for the first week and then increase your activity as tolerated  If you become short of breath or tired, stop and rest   You may require help with walking or feel more secure with something to lean on  Walking up steps and normal activities may be resumed as you feel ready  Most people tire easily for the first few weeks following leg surgery  This improves as conditioning returns  Avoid strenuous activity such as vigorous exercise  Avoid heavy lifting (do not lift more than 15 pounds) for the first four weeks after surgery  You should not drive a car for at least two weeks following discharge from the hospital and you are off all narcotic pain medication  You may ride in a car  DIET:  Resume your normal diet  Good nutrition is important for healing of your incision  If you are discharged on narcotics for pain control, continue taking your stool softeners until you are having regular bowel movements  INCISION:  You should shower daily  Wash incision daily with soap and water, but do not rub or scrub the incision; rinse thoroughly and pat dry  You may have stitches or staples to close your incision and it is okay for these to get wet  Do not bathe in a tub or swim for the first 4 week following surgery or if you have any open wounds  It is normal to have swelling or discoloration around the incision  If increasing redness or pain develops, call our office immediately  Numbness in the region of the incision may occur following the surgery  This normally improves over six to twelve months  You may have surgical glue over your incisions  There are stitches present under the skin which will absorb on their own  The glue is used to cover the access, assist in closure, and prevent contamination  This adhesive will darken and peel away on its own within one to two weeks  Do not pick at it      If you have a groin wound/incision, place a clean dry piece of gauze to cover your groin incision to keep incision clean and dry and prevent your skin from sticking together  Change gauze daily  You may have staples or stitches at your incisions  These will be removed at your follow-up appointment or when they are ready to come out  If you have a dressing over your surgical site, remove this on the second day after surgery  If you have foot or leg wounds, please follow your podiatrist/wound care doctor's instructions for care  If any of your incisions are open and require dressing changes, you will be given instructions for your daily incision care  If you are not able to change the dressings, a visiting nurse will be arranged  DO NOT put any powders, creams, ointments, or lotions on your incision  LEG SWELLING: Most patients have noticeable leg swelling after leg surgery  This usually improves within a few weeks  If swelling is present, elevate the leg whenever possible  Avoid sitting with the leg hanging down for prolonged periods of time  Walking is beneficial   An ACE bandage or support stocking may be helpful, but this should be discussed with your physician prior to use if you have a bypass  FOLLOW UP STUDIES:  Doppler ultrasound studies are very important for long-term management  Your surgeon will arrange this at your first postoperative visit  Repeat studies are then scheduled every three months for the first year and periodically after this  FOLLOW UP APPOINTMENTS:  Making and keeping follow up appointments and ultrasound tests are important to your recovery  If you have difficulty making it to or keeping your follow up appointments, call the office  If you have increased pain, fever >101 5, increased drainage, redness or a bad smell at your surgery site, new coldness/numbness of your arm or leg, please call us immediately and GO directly to the ER      Appt w/ CHRISTIANO Santamaria: 3/14/2023 at 8:30am, [place] office      PLEASE [address]  [phone]  [phone]  [address] [facility] , [address], [place], [address]  [address], [address], [place], [address]  [address], [facility], [address] [facility]  [address] [facility], [address] [facility], [facility], [address] [facility] [address]    [address] P[address], [address], [address]  [facility] [address], [address] [facility], [address] [facility], [facility], [address] [facility]  [address] [facility], [address] [facility], [address] [facility]  [address] [facility] [address], [address] [facility], [address] [facility],[address], Gina [address]  [address] [facility], [place] Leos, [address], Roman JOSEPH

## 2023-03-03 NOTE — INTERVAL H&P NOTE
H&P reviewed  After examining the patient I find no changes in the patients condition since the H&P had been written      Vitals:    03/03/23 0719   BP: (!) 196/113   Pulse: 70   Resp: 17   Temp: (!) 96 5 °F (35 8 °C)   SpO2: 97%

## 2023-03-03 NOTE — OP NOTE
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  Hx of L femoral endarterectomy and fem-above knee popliteal bypass at OSH, now occluded    Post-Op Diagnosis Codes:  same    Procedure(s):  Left common femoral artery aneurysm repair with interposition graft, 7-4 tapered propaten from distal externial iliac artery to profunda femoral artery    Specimen(s):  none    Estimated Blood Loss:   100cc    Drains:  Urethral Catheter Latex 16 Fr  (Active)   Site Assessment Clean;Skin intact;Pink;Patent 23 1423   Collection Container Standard drainage bag 23   Securement Method Securing device (Describe) 23 1423   Number of days: 0       Anesthesia Type:   General ETA    Operative Indications:  Pt is a 62 yo M w/ COPD, HLD, HTN, AAA, PAD s/p L fem endart and fem-AK pop bypass w/ non-reversed GSV Ju '17, presents w/ LLE rest pain and occluded bypass and thrombosed common femoral artery aneurysm    Operative Findings:  approx 2 5cm L CFA aneurysm, thrombosed, with thrombus extension into the profunda femoral artery approx 3cm to the first major branch point  Vein bypass anastomosed to the femoral bifurcation anteriorly is occluded  Native SFA is occluded    Complications:   None    Procedure and Technique:  After informed consent was obtained, the patient was brought to the operating room and placed in the supine position  Perioperative IV antibiotics were given  He was given anesthesia and endotracheally intubated  He was then prepped and draped in the usual sterile fashion exposing the left groin  A timeout was performed       A marking pen was used to jasbir a longitudinal incision over the femoral artery, below the level of the inguinal ligament overlying the prior groin incision  A 15-blade was used to make the incision  Cautery was used to dissect through the soft tissue  The inguinal ligament was identified and freed medially and laterally  A portion of the inguinal ligament was divided in order to allow for adequate proximal dissection  The femoral sheath was incised and the common femoral artery and distal external iliac artery was identified and freed proximally  A vessel loop was placed  The circumflex iliac branches were identified, freed, and encircled with vessel loops  The dissection was continued distally along the common femoral artery and aneurysm  The occluded vein bypass was identified and freed circumferentially and a vessel loop was placed  The proximal anastomosis came off of the femoral bifurcation anteriorly  Lastly, the profunda femoral artery was identified and dissected free to a soft portion, about 3cm along the artery at a trifurcation point  Vessel loops were placed distally around all branches  8000 units of IV heparin were given  After this was given time to circulate, ACT was performed and found to be 260s  The vessel loops were pulled taught and a kent dera clamp was placed on the distal external iliac artery  Cautery was used to incise the common femoral artery aneurysm and this was extended proximally and distally with Matute scissors  The aneurysm sac contained mural thrombus without evidence of infection and this was removed from the sac  Proximally, the artery was "T'ed off" at a portion of normal caliber proximal to the aneurysm, leaving a small portion of the back wall in place  Thrombus was removed from the lumen until is was clear up to the clamp  The clamp was released and brisk pulsatile flow was noted  It was reclamped and flushed clean  We then turned our attention distal to the profunda endpoint  The profunda artery was incised up to the trifurcation area    Soft thrombus was noted within  This was removed  The intima of the artery in this area was intact and smooth  The artery was "T'ed" off at the distal endpoint, leaving a small amount of the artery posteriorly  A 4-7 tapered propaten graft was selected and was soaked in a rifampin solution, because of his redo surgical site  The graft was cut to approximate length to allow the 4-5mm portion of the graft to align with the profunda femoral artery and the 7mm portion to align with the common femoral artery  The proximal anastomosis was created with 6-0 prolene suture, tied posteriorly and run circumferentially in either direction  The graft was clamped distally and flow was restored  Surgicel was used to aid with hemostasis  The graft was then cut to length and the distal anastomosis was created in an end to end fashion to the profunda stump with 6-0 prolene suture, tied posteriorly and run circumferentially in either direction  The arteries were back bled, the graft was bled, and it was flushed with heparinized saline  The anastomosis was completed and flow was restored  Doppler was used to listen to the profunda branches with appropriate signals  2-0 silk sutures were placed around the occluded SFA and bypass stumps which has been transected  The wound was then copiously irrigated with saline  It was then checked for hemostasis  Surgicel and floseal were used to aid with hemostasis  The aneurysm sac was then closed around the graft with chromic suture  The inguinal ligament was then repaired with 2-0 vicryl suture  The wound was closed with running 2-0 vicryl suture for mitzy's fascia, and two layers of running 3-0 vicryl for the soft tissue and deep dermal layers  3-0 nylon inturrupted mattressed sutures were used for the skin  The incision was dressed with silver mepilex  The patient was allowed to awaken and was extubated  He was then transferred to the PACU for postoperative care       I was present for the entire procedure, A qualified resident physician was not available and A physician assistant was required during the procedure for retraction tissue handling,dissection and suturing    Patient Disposition:  PACU         SIGNATURE: Carley Barnhart MD  DATE: March 3, 2023  TIME: 2:54 PM

## 2023-03-03 NOTE — DISCHARGE INSTR - OTHER ORDERS
Incisional care: Shower daily  Wash incision daily with soap and water  Pat dry thoroughly    Cover with clean, dry gauze to keep area clean and dry and to prevent skin-skin contact

## 2023-03-03 NOTE — ANESTHESIA POSTPROCEDURE EVALUATION
Post-Op Assessment Note    CV Status:  Stable  Pain Score: 0    Pain management: adequate     Mental Status:  Awake and sleepy   Hydration Status:  Euvolemic   PONV Controlled:  Controlled   Airway Patency:  Patent      Post Op Vitals Reviewed: Yes      Staff: Anesthesiologist, CRNA         No notable events documented      BP  149/74   Temp  97 9   Pulse  62   Resp   14   SpO2   96

## 2023-03-03 NOTE — ANESTHESIA PREPROCEDURE EVALUATION
Procedure:  L femoral endarterectomy and interposition graft; possible angiogram (Left: Leg Lower)  ARTERIOGRAM (Left: Abdomen)    Relevant Problems   CARDIO   (+) Abdominal aortic aneurysm (AAA) 3 0 cm to 5 5 cm in diameter in male   (+) Atherosclerosis of artery of extremity with rest pain (HCC)   (+) Femoral artery aneurysm, left (HCC)   (+) Femoral-popliteal bypass graft occlusion, left (HCC)   (+) Hyperlipemia   (+) Peripheral arterial disease (HCC)      PULMONARY   (+) COPD (chronic obstructive pulmonary disease) (HCC)      Other   (+) Tobacco abuse        Physical Exam    Airway    Mallampati score: III  TM Distance: >3 FB  Neck ROM: limited     Dental       Cardiovascular      Pulmonary      Other Findings        Anesthesia Plan  ASA Score- 3     Anesthesia Type- general with ASA Monitors  Additional Monitors: arterial line  Airway Plan: ETT  Comment: Low dose intraop BB  Plan Factors-    Chart reviewed  EKG reviewed  Imaging results reviewed  Existing labs reviewed  Patient summary reviewed  Patient is a current smoker  Patient did not smoke on day of surgery  Induction- intravenous  Postoperative Plan- Plan for postoperative opioid use  Planned trial extubation    Informed Consent- Anesthetic plan and risks discussed with patient  I personally reviewed this patient with the CRNA  Discussed and agreed on the Anesthesia Plan with the CRNA  Shilpi Mood         VITALS  BP (!) 196/113 Comment: left arm 200/109  Dr Lilia Roberts aware  Pulse 70   Temp (!) 96 5 °F (35 8 °C) (Temporal)   Resp 17   Ht 5' 11" (1 803 m)   Wt 89 4 kg (197 lb)   SpO2 97%   BMI 27 48 kg/m²   BP Readings from Last 3 Encounters:   03/03/23 (!) 196/113   02/15/23 130/80   02/15/23 150/100     LABS  Results from Last 12 Months   Lab Units 02/24/23  0738   SODIUM mmol/L 137   POTASSIUM mmol/L 4 4   CHLORIDE mmol/L 103   CO2 mmol/L 25   ANION GAP mmol/L 9   BUN mg/dL 23   CREATININE mg/dL 1 05   CALCIUM mg/dL 9 4   AST U/L 12*   ALT U/L 8   ALK PHOS U/L 59   TOTAL BILIRUBIN mg/dL 0 53   ALBUMIN g/dL 3 9     Results from Last 12 Months   Lab Units 23  0738   WBC Thousand/uL 8 19   HEMOGLOBIN g/dL 16 1   HEMATOCRIT % 49 0   PLATELETS Thousands/uL 216     Results from Last 12 Months   Lab Units 23  0738   INR  1 07       ANESTHESIA RISK-BENEFIT DISCUSSION  • BENEFITS INCLUDE ([facility] [id number], PMID [id number]): (1) The anesthesia team reduces mortality for major surgeries, (2) The team provides as much sedation/amnesia as is reasonably possible, and (3) The team strives to reduce pain and discomfort  • RISKS INCLUDE THE FOLLOWING (PMID 29330252):  Neurologic Risks: IntraOp awareness (Risk is ~1:1,000 - 1:14,000; PMID [id number]), Stroke (Risk ~<0 1-2% for most cases; PMID 35702000), and POCD  Airway and Pulmonary Risks: Dental or mouth injury, throat pain, critical hypoxia, pneumothorax, prolonged intubation, post-op respiratory compromise  • Airway/Intubation factors: No prior advanced airway notes in [facility] EMR  • Risk of pulmonary complications based on a simplified ARISCAT score (Major RFs: Case surgical time estimated at >2hrs: 1pt and Other factors/Clinical gestalt: 1 pt): Score 0-2= Low, 1 6%  Cardiovascular Risks: Crystal-op cardiac injury (MACE)  If specialized vascular access is needed, risk of bleeding, infection, or injury to adjacent structures  If a bypass circuit is needed, risk of stroke, blood clot, vasoplegia  • EKG:   Normal sinus rhythm   Normal ECG      Specimen Collected: 23 11:19            • Echo or other cardiac testin/15/2023 TTE  Interpretation Summary       •  Left Ventricle: Left ventricular cavity size is normal  Wall thickness is normal  The left ventricular ejection fraction is 55%  Systolic function is normal  Diastolic function is mildly abnormal, consistent with grade I (abnormal) relaxation  Left atrial filling pressure is normal   •  Aortic Valve:  The aortic valve has no significant stenosis  •  Mitral Valve: There is mild annular calcification  There is mild regurgitation  •  Pericardium: There is no pericardial effusion  •  Technically very difficult study with poor image quality  No parasternal views available  • Signs of severe cardiac instability: none  • Ambrosio's RCRI score and estimate risk of MACE (Major RFs: Surgical risk): A score of 1= 0 6%  • Are casey-op or intra-op beta blockers indicated?: yes; last metop on 3/2/2023   FEN/GI Risks: Aspiration (Approximately 0 5% risk per the IRIS trial) and PONV (10-80% depending on Apfel criteria)  • Patient meets ASA NPO guidelines: yes   Adverse drug reaction risks: Allergic reaction, overdose,risk of injury or accident if using machinery or performing physically or mentally hazardous tasks for 24 hrs after anesthesia    • Recent notable medications (including AC medications): none  • Personal or family history of anesthesia complications: no  • Pregnancy Status: Not applicable   Mortality risks associated with anesthesia based on ASA-PS (PMID 55601184)  - ASA-PS III: Estimated risk 1:3,500

## 2023-03-03 NOTE — ANESTHESIA PROCEDURE NOTES
Arterial Line Insertion  Performed by: Javier Reyna MD PhD  Authorized by: Javier Reyna MD PhD   Consent: Verbal consent obtained  Written consent obtained  Risks and benefits: risks, benefits and alternatives were discussed  Consent given by: patient  Procedure consent: procedure consent matches procedure scheduled  Relevant documents: relevant documents present and verified  Test results: test results available and properly labeled  Radiology Images: Radiology Images displayed and confirmed  If images not available, report reviewed  Patient identity confirmed: arm band and provided demographic data  Time out: Immediately prior to procedure a "time out" was called to verify the correct patient, procedure, equipment, support staff and site/side marked as required  Preparation: Patient was prepped and draped in the usual sterile fashion  Indications: hemodynamic monitoring  Orientation:  Left  Location: radial artery  Sedation:  Patient sedated: Under GA      Procedure Details:  Needle gauge: 20  Seldinger technique: Seldinger technique used  Number of attempts: 1    Post-procedure:  Post-procedure: dressing applied  Waveform: good waveform  Patient tolerance: Patient tolerated the procedure well with no immediate complications

## 2023-03-04 VITALS
BODY MASS INDEX: 27.59 KG/M2 | RESPIRATION RATE: 18 BRPM | TEMPERATURE: 98.3 F | WEIGHT: 197.09 LBS | HEART RATE: 72 BPM | HEIGHT: 71 IN | DIASTOLIC BLOOD PRESSURE: 88 MMHG | OXYGEN SATURATION: 94 % | SYSTOLIC BLOOD PRESSURE: 167 MMHG

## 2023-03-04 LAB
ANION GAP SERPL CALCULATED.3IONS-SCNC: 4 MMOL/L (ref 4–13)
APTT PPP: 29 SECONDS (ref 23–37)
BUN SERPL-MCNC: 13 MG/DL (ref 5–25)
CALCIUM SERPL-MCNC: 8.9 MG/DL (ref 8.3–10.1)
CHLORIDE SERPL-SCNC: 108 MMOL/L (ref 96–108)
CO2 SERPL-SCNC: 26 MMOL/L (ref 21–32)
CREAT SERPL-MCNC: 0.82 MG/DL (ref 0.6–1.3)
ERYTHROCYTE [DISTWIDTH] IN BLOOD BY AUTOMATED COUNT: 14.1 % (ref 11.6–15.1)
GFR SERPL CREATININE-BSD FRML MDRD: 94 ML/MIN/1.73SQ M
GLUCOSE SERPL-MCNC: 97 MG/DL (ref 65–140)
HCT VFR BLD AUTO: 41.5 % (ref 36.5–49.3)
HGB BLD-MCNC: 13.7 G/DL (ref 12–17)
INR PPP: 1.08 (ref 0.84–1.19)
MCH RBC QN AUTO: 29.3 PG (ref 26.8–34.3)
MCHC RBC AUTO-ENTMCNC: 33 G/DL (ref 31.4–37.4)
MCV RBC AUTO: 89 FL (ref 82–98)
PLATELET # BLD AUTO: 225 THOUSANDS/UL (ref 149–390)
PMV BLD AUTO: 10.2 FL (ref 8.9–12.7)
POTASSIUM SERPL-SCNC: 3.9 MMOL/L (ref 3.5–5.3)
PROTHROMBIN TIME: 14.2 SECONDS (ref 11.6–14.5)
RBC # BLD AUTO: 4.67 MILLION/UL (ref 3.88–5.62)
SODIUM SERPL-SCNC: 138 MMOL/L (ref 135–147)
WBC # BLD AUTO: 12.72 THOUSAND/UL (ref 4.31–10.16)

## 2023-03-04 RX ORDER — ONDANSETRON 2 MG/ML
4 INJECTION INTRAMUSCULAR; INTRAVENOUS EVERY 6 HOURS PRN
Status: DISCONTINUED | OUTPATIENT
Start: 2023-03-04 | End: 2023-03-04 | Stop reason: HOSPADM

## 2023-03-04 RX ORDER — ASPIRIN 81 MG/1
81 TABLET, CHEWABLE ORAL DAILY
Status: DISCONTINUED | OUTPATIENT
Start: 2023-03-04 | End: 2023-03-04 | Stop reason: HOSPADM

## 2023-03-04 RX ORDER — POLYETHYLENE GLYCOL 3350 17 G/17G
17 POWDER, FOR SOLUTION ORAL DAILY PRN
Status: DISCONTINUED | OUTPATIENT
Start: 2023-03-04 | End: 2023-03-04 | Stop reason: HOSPADM

## 2023-03-04 RX ORDER — DOCUSATE SODIUM 100 MG/1
100 CAPSULE, LIQUID FILLED ORAL 2 TIMES DAILY
Status: DISCONTINUED | OUTPATIENT
Start: 2023-03-04 | End: 2023-03-04 | Stop reason: HOSPADM

## 2023-03-04 RX ORDER — HYDROMORPHONE HCL IN WATER/PF 6 MG/30 ML
0.2 PATIENT CONTROLLED ANALGESIA SYRINGE INTRAVENOUS
Status: DISCONTINUED | OUTPATIENT
Start: 2023-03-04 | End: 2023-03-04 | Stop reason: HOSPADM

## 2023-03-04 RX ORDER — FLUTICASONE FUROATE AND VILANTEROL 200; 25 UG/1; UG/1
1 POWDER RESPIRATORY (INHALATION)
Status: DISCONTINUED | OUTPATIENT
Start: 2023-03-04 | End: 2023-03-04 | Stop reason: HOSPADM

## 2023-03-04 RX ORDER — ALBUTEROL SULFATE 90 UG/1
2 AEROSOL, METERED RESPIRATORY (INHALATION) EVERY 4 HOURS PRN
Status: DISCONTINUED | OUTPATIENT
Start: 2023-03-04 | End: 2023-03-04 | Stop reason: HOSPADM

## 2023-03-04 RX ORDER — CLOPIDOGREL BISULFATE 75 MG/1
75 TABLET ORAL DAILY
Status: DISCONTINUED | OUTPATIENT
Start: 2023-03-04 | End: 2023-03-04 | Stop reason: HOSPADM

## 2023-03-04 RX ORDER — PRAVASTATIN SODIUM 80 MG/1
80 TABLET ORAL
Status: DISCONTINUED | OUTPATIENT
Start: 2023-03-04 | End: 2023-03-04 | Stop reason: HOSPADM

## 2023-03-04 RX ORDER — CLOPIDOGREL BISULFATE 75 MG/1
75 TABLET ORAL DAILY
Qty: 30 TABLET | Refills: 3 | Status: SHIPPED | OUTPATIENT
Start: 2023-03-05

## 2023-03-04 RX ORDER — ACETAMINOPHEN 325 MG/1
650 TABLET ORAL EVERY 6 HOURS PRN
Status: DISCONTINUED | OUTPATIENT
Start: 2023-03-04 | End: 2023-03-04 | Stop reason: HOSPADM

## 2023-03-04 RX ORDER — OXYCODONE HYDROCHLORIDE 5 MG/1
5 TABLET ORAL EVERY 4 HOURS PRN
Status: DISCONTINUED | OUTPATIENT
Start: 2023-03-04 | End: 2023-03-04 | Stop reason: HOSPADM

## 2023-03-04 RX ORDER — OXYCODONE HYDROCHLORIDE 10 MG/1
10 TABLET ORAL EVERY 4 HOURS PRN
Status: DISCONTINUED | OUTPATIENT
Start: 2023-03-04 | End: 2023-03-04 | Stop reason: HOSPADM

## 2023-03-04 RX ORDER — EZETIMIBE 10 MG/1
10 TABLET ORAL DAILY
Status: DISCONTINUED | OUTPATIENT
Start: 2023-03-04 | End: 2023-03-04 | Stop reason: HOSPADM

## 2023-03-04 RX ORDER — ACETAMINOPHEN 325 MG/1
650 TABLET ORAL EVERY 6 HOURS PRN
COMMUNITY
Start: 2023-03-04

## 2023-03-04 RX ADMIN — CLOPIDOGREL BISULFATE 75 MG: 75 TABLET ORAL at 09:42

## 2023-03-04 RX ADMIN — METOPROLOL TARTRATE 25 MG: 25 TABLET, FILM COATED ORAL at 09:45

## 2023-03-04 RX ADMIN — ASPIRIN 81 MG: 81 TABLET, CHEWABLE ORAL at 09:43

## 2023-03-04 RX ADMIN — SODIUM CHLORIDE 100 ML/HR: 0.9 INJECTION, SOLUTION INTRAVENOUS at 06:44

## 2023-03-04 RX ADMIN — HEPARIN SODIUM 5000 UNITS: 5000 INJECTION INTRAVENOUS; SUBCUTANEOUS at 05:14

## 2023-03-04 RX ADMIN — METOPROLOL TARTRATE 25 MG: 25 TABLET, FILM COATED ORAL at 00:44

## 2023-03-04 RX ADMIN — EZETIMIBE 10 MG: 10 TABLET ORAL at 09:42

## 2023-03-04 RX ADMIN — FLUTICASONE FUROATE AND VILANTEROL TRIFENATATE 1 PUFF: 200; 25 POWDER RESPIRATORY (INHALATION) at 09:43

## 2023-03-04 RX ADMIN — DOCUSATE SODIUM 100 MG: 100 CAPSULE, LIQUID FILLED ORAL at 09:42

## 2023-03-04 NOTE — PLAN OF CARE
Problem: MOBILITY - ADULT  Goal: Maintain or return to baseline ADL function  Description: INTERVENTIONS:  -  Assess patient's ability to carry out ADLs; assess patient's baseline for ADL function and identify physical deficits which impact ability to perform ADLs (bathing, care of mouth/teeth, toileting, grooming, dressing, etc )  - Assess/evaluate cause of self-care deficits   - Assess range of motion  - Assess patient's mobility; develop plan if impaired  - Assess patient's need for assistive devices and provide as appropriate  - Encourage maximum independence but intervene and supervise when necessary  - Involve family in performance of ADLs  - Assess for home care needs following discharge   - Consider OT consult to assist with ADL evaluation and planning for discharge  - Provide patient education as appropriate  3/4/2023 1709 by Wesley Aquino RN  Outcome: Completed  3/4/2023 1709 by Wesley Aquino RN  Outcome: Progressing  3/4/2023 1539 by Wesley Aquino RN  Outcome: Progressing  Goal: Maintains/Returns to pre admission functional level  Description: INTERVENTIONS:  - Perform BMAT or MOVE assessment daily    - Set and communicate daily mobility goal to care team and patient/family/caregiver     - Collaborate with rehabilitation services on mobility goals if consulted  - - Out of bed for toileting  - Record patient progress and toleration of activity level   3/4/2023 1709 by Wesley Aquino RN  Outcome: Completed  3/4/2023 1709 by Wesley Aquino RN  Outcome: Progressing  3/4/2023 1539 by Wesley Aquino RN  Outcome: Progressing

## 2023-03-04 NOTE — PROGRESS NOTES
Progress Note - Vascular Surgery  Laura Strickland 61 y o  male MRN: [id number]  Unit/Bed#: [facility] 407-01 Encounter: [id number]         Assessment    Patient is a 61 y o , male with a PMHx of AAA, PAD, HLD, COPD, hx of Left Fem-pop bypass occlusion who is now S/p elective LLE CFA aneurysm repair with propaten graft from Distal EIA to Profunda  Plan:  - Patient s/p left CFA anuerysm repair with tapered propaten graft from distal EIA to profunda  POD1 doing well  - YOVANY Koenig   - Encourage ISS  - Diet regular  - Pain medication PRN   - If patient stable, can consider dispo either today or tomorrow  - Please wait for attending attestation for final recommendations  ________________________________________________________________  Subjective:  Laura Strickland was seen and evaluated at bedside  No acute events overnight  Reports some soreness however overall doing well         Medications:  Scheduled Meds:  Current Facility-Administered Medications   Medication Dose Route Frequency Provider Last Rate   • acetaminophen  650 mg Oral Q6H PRN Ruddy Low PA-C     • albuterol  2 puff Inhalation Q4H PRN Ruddy oLw PA-C     • albuterol  2 puff Inhalation Q4H PRN Sabrina Barnhart MD     • aspirin  81 mg Oral Daily Ruddy Low PA-C     • clopidogrel  75 mg Oral Daily Ruddy Low PA-C     • docusate sodium  100 mg Oral BID Ruddy Low PA-C     • ezetimibe  10 mg Oral Daily Ruddy Low PA-C     • fluticasone-vilanterol  1 puff Inhalation Daily Ruddy Low PA-C     • heparin (porcine)  5,000 Units Subcutaneous [facility] Ruddy Low PA-C     • HYDROmorphone  0 2 mg Intravenous Q3H PRN Ruddy Low PA-C     • labetalol  5 mg Intravenous Q15 Min PRN Laney Vuong PA-C     • lactated ringers  500 mL Intravenous Once PRN Ruddy Low PA-C      And   • lact[facility]  [facility]     • metoprolol tartrate  25 mg Oral Q12H [address] [facility]     • ondansetron  4 mg Intravenous Q6H [facility]     • oxyCODONE  5 mg Oral Q4H [facility]      Or   • oxyCODONE  10 mg Oral Q4H [facility]N [facility]     • polyethylene glycol  17 g Oral Daily [facility]     • pravastatin  80 mg Oral Daily With [place] Monge PA-C     • sodium chloride  500 mL Intravenous Once [facility]N [facility]      And   • sodium chloride  500 mL Intravenous Once [facility]N [facility]     • sodium chloride  100 mL/hr Intravenous Continuous [facility], PA- 100 mL/hr (03/04/23 3412)     Continuous Infusions:sodium chloride, 100 mL/hr, Last Rate: 100 mL/hr (03/04/23 0644)      PRN Meds:  •  acetaminophen  •  albuterol  •  albuterol  •  HYDROmorphone  •  labetalol  •  lactated ringers **AND** lactated ringers  •  ondansetron  •  oxyCODONE **OR** oxyCODONE  •  polyethylene glycol  •  sodium chloride **AND** sodium chloride    Prior to Admission medications    Medication Sig Start Date End Date Taking? Authorizing Provider   albuterol (PROVENTIL HFA,VENTOLIN HFA) 90 mcg/act inhaler Inhale 2 puffs every 4 (four) hours as needed for wheezing 3/16/20  Yes Nirav Smart PA-C   aspirin 81 mg chewable tablet Chew 81 mg daily   Yes Historical Provider, MD   ergocalciferol (ERGOCALCIFEROL) 1 25 MG (40469 UT) capsule Take 1 capsule every week by oral route for 90 days  2/2/23  Yes Historical Provider, MD   ezetimibe (ZETIA) 10 mg tablet Take 10 mg by mouth daily 1/12/23  Yes Historical Provider, MD   Fluticasone-Salmeterol (Advair) 250-50 mcg/dose inhaler Inhale 1 puff 2 (two) times a day Rinse mouth after use     Yes Historical Provider, MD   metoprolol tartrate (LOPRESSOR) 25 mg tablet 2 (two) times a day 1/12/23  Yes Historical Provider, MD   simvastatin (ZOCOR) 40 mg tablet TAKE 1 TABLET BY MOUTH EVERY DAY IN THE EVENING FOR 90 DAYS 1/12/23  Yes Historical Provider, MD   varenicline (CHANTIX) 0 5 mg tablet Take 0 5 mg by mouth 2 (two) times a day   Yes Historical Provider, MD        Physical Exam:  Vitals:  Vitals:    03/04/23 0241 03/04/23 0500 03/04/23 0600 03/04/23 0721   BP: 158/82   167/88   BP Location: Right arm   Right arm   Pulse: 72   72   Resp: 18   18   Temp: 98 2 °F (36 8 °C)   98 3 °F (36 8 °C)   TempSrc: Oral   Oral   SpO2: 93% 94%  93%   Weight:   89 4 kg (197 lb 1 5 oz)    Height:           I/Os:  I/O last 3 completed shifts: In: 3773 3 [P O :100; I V :3673 3]  Out: 3900 [Urine:3900]  I/O this shift:  In: -   Out: 375 [Urine:375]    Invasive Lines/Tubes:  Invasive Devices     Peripheral Intravenous Line  Duration           Peripheral IV 03/03/23 Left Hand 1 day    Peripheral IV 03/03/23 Right Arm 1 day          Arterial Line  Duration           Arterial Line 03/03/23 Left Radial 1 day          Drain  Duration           Urethral Catheter Latex 16 Fr  1 day                General: AAO x3, NAD  CV: No carotid bruits  Respiratory: smlung: CTA B/L   Abdominal: abdomen is soft, non-tender and non-distended  Sensory: 0=Normal; no sensory loss   Motor: Normal    Extremities:    Observation: On on observation, patient's groin access site is clean, dry and intact  No signs of hematoma or active bleeding  Patient motor examination appears to be intact on observation  Palpation: On palpation, patient was noted to have mild discomfort to the access site  Sensation was intact BL  Motor exam revealed 5/5 strength with ankle dorsiflexion and plantarflexion       Pulse exam:  Right: Palpable fem, Dop DP/PT    Left: Palpable fem, Dop DP/PT              Lab Results and Cultures:   CBC w/ Diff:  Results from last 7 days   Lab Units 03/04/23  0444   WBC Thousand/uL 12 72*   HEMOGLOBIN g/dL 13 7   HEMATOCRIT % 41 5   PLATELETS Thousands/uL 225   MCV fL 89   RBC Million/uL 4 67   RDW % 14 1   MPV fL 10 2     BMP/CMP:   Results from last 7 days   Lab Units 03/04/23  0444   POTASSIUM mmol/L 3 9   CHLORIDE mmol/L 108   CO2 mmol/L 26   BUN mg/dL 13   CREATININE mg/dL 0 82   CALCIUM mg/dL 8 9   EGFR ml/min/1 73sq m 94     Coags:  Results from last 7 days   Lab Units 03/04/23  0444   INR  1 08   PTT seconds 29     HgbA1c:   No results found for: HGBA1C  Lipid Panel:   No results found for: CHOL  Lab Results   Component Value Date    HDL 37 (L) 02/24/2023     Lab Results   Component Value Date    HDL 37 (L) 02/24/2023     Lab Results   Component Value Date    LDLCALC 207 (H) 02/24/2023     Lab Results   Component Value Date    TRIG 91 02/24/2023     Blood Culture: No results found for: BLOODCX,   Urinalysis: No results found for: Susan Niece, SPECGRAV, PHUR, LEUKOCYTESUR, NITRITE, PROTEINUA, GLUCOSEU, KETONESU, BILIRUBINUR, BLOODU,   Urine Culture: No results found for: URINECX  Blood Culture: No results found for: BLOODCX  Wound Culure: No results found for: WOUNDCULT    Imaging:  CTA abdominal w run off w wo contrast    Result Date: 1/23/2023  Impression VASCULAR: Infrarenal abdominal aortic aneurysm measures 43 x 38 mm with eccentric mural thrombus  LEFT LOWER EXTREMITY: 1  Both the left femoral-popliteal bypass graft and native superficial femoral artery are occluded  The left femoral endarterectomy site is also occluded  2   Mild aneurysmal dilatation of the P2 segment with near complete chronic thrombotic occlusion  3   Aneurysmal dilatation of both the terminal left common iliac and proximal left internal iliac arteries  RIGHT LOWER EXTREMITY: Moderate atherosclerotic narrowing at the adductor canal   Severe calcific narrowing at the terminal P3 segment and tibioperoneal trunk  The study was marked in EPIC for significant notification  Workstation performed: [id number]     No CTA results available for this patient            Code Status: No Order  Advance Directive and Living Will: Power of :    POLST:        Gurpreet Eubanks PA-C  3/4/2023

## 2023-03-04 NOTE — DISCHARGE SUMMARY
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, hx of left femoral endartterectomy and fem-above knee popliteal bypass at OSH now occluded    Secondary Diagnosis:  Past Medical History:   Diagnosis Date   • COPD (chronic obstructive pulmonary disease) ([facility] )      Past Surgical History:   Procedure Laterality Date   • BYPASS FEMORAL-POPLITEAL Left 2017   • FEMORAL ENDARTARECTOMY Left 2017   • VASECTOMY          Procedures Performed: Left common femoral artery aneurysm repair with interposition graft, 7-4 tapered propaten from distal externial iliac artery to profunda femoral artery on 3/3/2023    Discharge Medications:  See after visit summary for reconciled discharge medications provided to patient and family  Hospital Course:     Pt is a 60 yo male who saw Dr Tez Cervantes Doctor for PAD with rest pain  He has a history of COPD, HLD, HTN, AAA, PAD s/p L fem endart and fem-AK pop bypass w/ non-reversed GSV Ju '17, presents w/ LLE rest pain and occluded bypass and thrombosed common femoral artery aneurysm  He was planned for elective surgery on 3/3/2023  He underwent a left common femoral artery aneurysm repair with interposition graft, 7-4 tapered propaten from distal external iliac artery to profunda femoral artery  He recovered well that night with no issues  The next day he was feeling well, tolerating a diet, ambulating well and urinating spontaneously  He was requesting discharge home on 3/4/2023        Condition at Discharge: stable     Provisions for Follow-Up Care:  See after visit summary for information related to follow-up care and any pertinent home health orders  Disposition: Home    Discharge instructions/Information to patient and family:   See after visit summary for information provided to patient and family  Planned Readmission: No    Discharge Statement   I spent 20 minutes discharging the patient  This time was spent on the day of discharge  I had direct contact with the patient on the day of discharge  Additional documentation is required if more than 30 minutes were spent on discharge

## 2023-03-04 NOTE — UTILIZATION REVIEW
Initial Clinical Review    Elective Inpatient surgical procedure  Age/Sex: 61 y o  male  Surgery Date: 3/3/23  Procedure: Left common femoral artery aneurysm repair with interposition graft, 7-4 tapered propaten from distal externial iliac artery to profunda femoral artery  Anesthesia: general  Operative Findings: approx 2 5cm L CFA aneurysm, thrombosed, with thrombus extension into the profunda femoral artery approx 3cm to the first major branch point  Vein bypass anastomosed to the femoral bifurcation anteriorly is occluded  Native SFA is occluded    POD#1 Progress Note:  No acute events overnight  Reports some soreness however overall doing well  YOVANY Ignacio, MANJIT Guerra, encourage ISS, pain meds prn  Patient's groin access site is clean, dry and intact  No signs of hematoma or active bleeding  Patient motor examination appears to be intact on observation  Patient was noted to have mild discomfort to the access site  Sensation was intact BL  Motor exam revealed 5/5 strength with ankle dorsiflexion and plantarflexion       Admission Orders: Date/Time/Statement:   Admission Orders (From admission, onward)     Ordered        03/03/23 1448  Inpatient Admission  Once                      Orders Placed This Encounter   Procedures   • Inpatient Admission     Standing Status:   Standing     Number of Occurrences:   1     Order Specific Question:   Level of Care     Answer:   Level 1 Stepdown [13]     Order Specific Question:   Estimated length of stay     Answer:   More than 2 Midnights     Order Specific Question:   Certification     Answer:   I certify that inpatient services are medically necessary for this patient for a duration of greater than two midnights  See H&P and MD Progress Notes for additional information about the patient's course of treatment       Vital Signs: /88 (BP Location: Right arm)   Pulse 72   Temp 98 3 °F (36 8 °C) (Oral)   Resp 18   Ht 5' 11" (1 803 m)   Wt 89 4 kg (197 lb 1 5 oz) SpO2 93%   BMI 27 49 kg/m²     Pertinent Labs/Diagnostic Test Results:   No orders to display         Results from last 7 days   Lab Units 03/04/23 0444   WBC Thousand/uL 12 72*   HEMOGLOBIN g/dL 13 7   HEMATOCRIT % 41 5   PLATELETS Thousands/uL 225         Results from last 7 days   Lab Units 03/04/23  0444   SODIUM mmol/L 138   POTASSIUM mmol/L 3 9   CHLORIDE mmol/L 108   CO2 mmol/L 26   ANION GAP mmol/L 4   BUN mg/dL 13   CREATININE mg/dL 0 82   EGFR ml/min/1 73sq m 94   CALCIUM mg/dL 8 9     Results from last 7 days   Lab Units 03/04/23  0444   GLUCOSE RANDOM mg/dL 97     Results from last 7 days   Lab Units 03/04/23  0444   PROTIME seconds 14 2   INR  1 08   PTT seconds 29     Diet: regular  Mobility: post procedure bedrest  DVT Prophylaxis: plavix, heparin, scd    Medications/Pain Control:   Scheduled Medications:  aspirin, 81 mg, Oral, Daily  clopidogrel, 75 mg, Oral, Daily  docusate sodium, 100 mg, Oral, BID  ezetimibe, 10 mg, Oral, Daily  fluticasone-vilanterol, 1 puff, Inhalation, Daily  heparin (porcine), 5,000 Units, Subcutaneous, Q8H SABA  metoprolol tartrate, 25 mg, Oral, Q12H SABA  pravastatin, 80 mg, Oral, Daily With Dinner      Continuous IV Infusions:  sodium chloride, 100 mL/hr, Intravenous, Continuous      PRN Meds:  acetaminophen, 650 mg, Oral, Q6H PRN  albuterol, 2 puff, Inhalation, Q4H PRN  albuterol, 2 puff, Inhalation, Q4H PRN  HYDROmorphone, 0 2 mg, Intravenous, Q3H PRN  labetalol, 5 mg, Intravenous, Q15 Min PRN  lactated ringers, 500 mL, Intravenous, Once PRN   And  lactated ringers, 500 mL, Intravenous, Once PRN  ondansetron, 4 mg, Intravenous, Q6H PRN  oxyCODONE, 5 mg, Oral, Q4H PRN   Or  oxyCODONE, 10 mg, Oral, Q4H PRN  polyethylene glycol, 17 g, Oral, Daily PRN  sodium chloride, 500 mL, Intravenous, Once PRN   And  sodium chloride, 500 mL, Intravenous, Once PRN        Network Utilization Review Department  ATTENTION: Please call with any questions or concerns to [phone] and carefully listen to the prompts so that you are directed to the right person  All voicemails are confidential   Neldon Brain all requests for admission clinical reviews, approved or denied determinations and any other requests to dedicated fax number below belonging to the campus where the patient is receiving treatment   List of dedicated fax numbers for the Facilities:  [address] DENIALS (Administrative/Medical Necessity) [phone]   [address] (Maternity/NICU/Pediatrics) [phone]   5 Toshia Sullivan [phone]   [facility] [place]  [phone]   [address] [place] [address] [address] [facility] 28 [phone]   1558 [facility] 134 815 [facility] [phone]

## 2023-03-04 NOTE — PLAN OF CARE
Problem: MOBILITY - ADULT  Goal: Maintain or return to baseline ADL function  Description: INTERVENTIONS:  -  Assess patient's ability to carry out ADLs; assess patient's baseline for ADL function and identify physical deficits which impact ability to perform ADLs (bathing, care of mouth/teeth, toileting, grooming, dressing, etc )  - Assess/evaluate cause of self-care deficits   - Assess range of motion  - Assess patient's mobility; develop plan if impaired  - Assess patient's need for assistive devices and provide as appropriate  - Encourage maximum independence but intervene and supervise when necessary  - Involve family in performance of ADLs  - Assess for home care needs following discharge   - Consider OT consult to assist with ADL evaluation and planning for discharge  - Provide patient education as appropriate  3/4/2023 1539 by Osito Thompson RN  Outcome: Progressing  3/4/2023 1539 by Osito Thompson, RN  Outcome: Progressing  Goal: Maintains/Returns to pre admission functional level  Description: INTERVENTIONS:  - Perform BMAT or MOVE assessment daily    - Set and communicate daily mobility goal to care team and patient/family/caregiver     - Collaborate with rehabilitation services on mobility goals if consulted  -- Out of bed for toileting  - Record patient progress and toleration of activity level   Outcome: Progressing

## 2023-03-04 NOTE — QUICK NOTE
Post Op Check Note - Surgery Resident  Inocente Tolbert 61 y o  male MRN: [id number]  Unit/Bed#: [facility] 407-01 Encounter: [id number]    ASSESSMENT:  Inocente Tolbert is a 61 y o  male who is status post Left common femoral artery aneurysm repair with interposition graft, 7-4 tapered propaten from distal externial iliac artery to profunda femoral artery  DC [place] in am   PRN labetalol for HTN    Subjective: Patient reports feeling well with no complaints    Physical Exam:  GEN: NAD  CV: RRR  Lung: Normal effort  Ab: Soft, NT/ND  Neuro: A+Ox3  Incisions: c/d/i  Pulses: Left PT: doppler signal, Left AT: very weak doppler signal    Blood pressure 158/82, pulse 72, temperature 98 2 °F (36 8 °C), temperature source Oral, resp  rate 18, height 5' 11" (1 803 m), weight 89 4 kg (197 lb), SpO2 93 %  ,Body mass index is 27 48 kg/m²        Intake/Output Summary (Last 24 hours) at 3/4/2023 0558  Last data filed at 3/4/2023 0501  Gross per 24 hour   Intake 2900 ml   Output 3900 ml   Net -1000 ml       Invasive Devices     Peripheral Intravenous Line  Duration           Peripheral IV 03/03/23 Left Hand <1 day    Peripheral IV 03/03/23 Right Arm <1 day          Arterial Line  Duration           Arterial Line 03/03/23 Left Radial <1 day          Drain  Duration           Urethral Catheter Latex 16 Fr  <1 day                VTE Pharmacologic Prophylaxis: Heparin

## 2023-03-05 LAB
ABO GROUP BLD BPU: NORMAL
ABO GROUP BLD BPU: NORMAL
BPU ID: NORMAL
BPU ID: NORMAL
CROSSMATCH: NORMAL
CROSSMATCH: NORMAL
UNIT DISPENSE STATUS: NORMAL
UNIT DISPENSE STATUS: NORMAL
UNIT PRODUCT CODE: NORMAL
UNIT PRODUCT CODE: NORMAL
UNIT PRODUCT VOLUME: 350 ML
UNIT PRODUCT VOLUME: 350 ML
UNIT RH: NORMAL
UNIT RH: NORMAL

## 2023-03-06 NOTE — UTILIZATION REVIEW
NOTIFICATION OF ADMISSION DISCHARGE   This is a Notification of Discharge from [facility]  Please be advised that this patient has been discharge from our facility  Below you will find the admission and discharge date and time including the patient’s disposition  UTILIZATION REVIEW CONTACT:  Maciel Sands  Utilization   Network Utilization Review Department  Phone: [phone] x carefully listen to the prompts  All voicemails are confidential   Email: [email or web address]     ADMISSION INFORMATION  PRESENTATION DATE: 3/3/2023  6:59 AM  OBERVATION ADMISSION DATE:   INPATIENT ADMISSION DATE: 3/3/23  2:48 PM   DISCHARGE DATE: 3/4/2023  5:42 PM   DISPOSITION:Home/Self Care    IMPORTANT INFORMATION:  Send all requests for admission clinical reviews, approved or denied determinations and any other requests to dedicated fax number below belonging to the campus where the patient is receiving treatment   List of dedicated fax numbers:  [address] DENIALS (Administrative/Medical Necessity) [phone]   [address] (Maternity/NICU/Pediatrics) [phone]   [facility] [phone]   [place] [address] [phone]   [address] [place] [address]  [phone] 220 [facility] [phone] 90 [facility] [phone]   [facility] [place] [address] [phone]   [facility]  [phone]   4059 [facility] [phone] 412 [facility] [address] [facility] [phone]

## 2023-03-07 ENCOUNTER — TELEPHONE (OUTPATIENT)
Dept: VASCULAR SURGERY | Facility: CLINIC | Age: 63
End: 2023-03-07

## 2023-03-07 NOTE — TELEPHONE ENCOUNTER
Vascular Nurse Navigator Post Op Call    Procedure: Left common femoral artery aneurysm repair with interposition graft, 7-4 tapered propaten from distal externial iliac artery to profunda femoral artery    Date of Procedure: 3/3/23    Surgeon:    Gildardo Barnhart MD - Primary     * Champ Marrufo PA-C - Assisting    Discharge Date: 3/4/23    Discharge Disposition: Home    Leg Weakness?: No    Leg Swelling?: No    Leg Numbness?: No    Chest Pain?: No    Shortness of Breath?: No    Orthopnea?: No    Anticoagulation pt was discharged on post op?: Aspirin and Clopidogrel (Plavix)    Statin pt was discharged on post op?:  Zocor (simvastatin)     Bleeding?: No    Uncontrolled Pain?: No    Incision Concerns?: No    Fever or Chills?: No      Reviewed discharge instructions and incision care with patient  NEXT OFFICE VISIT SCHEDULED:  3/14/23 at 8:30 am with CHRISTIANO Newsome at [facility] Confirmed?: Yes      Any further questions/concerns? Patient stated that he is doing good since discharge  He stated that he is washing his incision daily with soap and water and applying a clean piece of guaze to groin to help keep it dry  He denies any drainage from incision  Reviewed activity restrictions with him  Reviewed discharge medications - Aspirin and Plavix  All questions answered  No concerns expressed at this time  Patient stated that his family doctor completed short term disability papers prior to surgery and he inquired if Dr Oksana Oliva Doctor would need to fill out any forms to keep him out of work since he had the surgery  Advised him that he can fax forms to office  He stated that he will bring them with him to his post op appointment

## 2023-03-14 ENCOUNTER — OFFICE VISIT (OUTPATIENT)
Dept: VASCULAR SURGERY | Facility: CLINIC | Age: 63
End: 2023-03-14

## 2023-03-14 VITALS
DIASTOLIC BLOOD PRESSURE: 82 MMHG | BODY MASS INDEX: 28.5 KG/M2 | HEIGHT: 71 IN | HEART RATE: 62 BPM | WEIGHT: 203.6 LBS | SYSTOLIC BLOOD PRESSURE: 130 MMHG

## 2023-03-14 DIAGNOSIS — I71.40 ABDOMINAL AORTIC ANEURYSM (AAA) 3.0 CM TO 5.5 CM IN DIAMETER IN MALE: Primary | ICD-10-CM

## 2023-03-14 DIAGNOSIS — T82.898D OCCLUSION OF LEFT FEMOROPOPLITEAL BYPASS GRAFT, SUBSEQUENT ENCOUNTER: ICD-10-CM

## 2023-03-14 DIAGNOSIS — I70.229 ATHEROSCLEROSIS OF ARTERY OF EXTREMITY WITH REST PAIN (HCC): ICD-10-CM

## 2023-03-14 DIAGNOSIS — I73.9 PERIPHERAL ARTERY DISEASE (HCC): ICD-10-CM

## 2023-03-14 RX ORDER — FLUTICASONE FUROATE, UMECLIDINIUM BROMIDE AND VILANTEROL TRIFENATATE 100; 62.5; 25 UG/1; UG/1; UG/1
POWDER RESPIRATORY (INHALATION)
COMMUNITY
Start: 2023-03-09

## 2023-03-14 NOTE — ASSESSMENT & PLAN NOTE
59yo male smoker (quit 2 weeks ago) with HTN, HLD, COPD, AAA, L iliac artery aneurysm, PAD s/p L femoral endarterectomy and fem-AK pop bypass w/ nonreversed GSV '17-occluded, now s/p L CFA aneurysm repair with interposition graft tapered from distal EIA to profunda femoral artery 3/3/23 (L  Doctor) presents for post op follow up     -Patient feels great postoperatively  Denies pain/paresthesias, Denies claudication or rest pain   -No edema  -Left groin incision CDI with sutures  Well approximated  No dehiscence, drainage, erythema, hematoma or signs of infection    Sutures removed at office visit today without incident  -+1 L DP, Doppler L PT signal  -Palpable left femoral pulse  -Patient has not smoked since prior to surgery, congratulated on his efforts      Plan:  - LEAD in 3 months with return OV  (L Doctor)   - AOIL in 3 months (routine 6mo AAA surveillance)  -Continue aspirin Plavix  -Continue statin  -Continued smoking cessation  -Incision care reviewed  -Return to work 4/3/2023  -Cc Dr Digna Hernandez Doctor

## 2023-03-14 NOTE — PATIENT INSTRUCTIONS
Keep incision clean and dry  Cleanse daily soap and water, pat dry      Continue Aspirin and Plavix  Continue Statin  Continued smoking cessation    Call or return to office with any questions or concerns, changes to incision including increased redness, pain, drainage, signs infection, fever or chills  Call with any new or worsening leg symptoms  Return to office with Dr Hammad Hinojosa Doctor 3-month post surgery

## 2023-03-14 NOTE — PROGRESS NOTES
Assessment/Plan:    Femoral-popliteal bypass graft occlusion, left [facility])  61yo male smoker (quit 2 weeks ago) with HTN, HLD, COPD, AAA, L iliac artery aneurysm, PAD s/p L femoral endarterectomy and fem-AK pop bypass w/ nonreversed GSV '17-occluded, now s/p L CFA aneurysm repair with interposition graft tapered from distal EIA to profunda femoral artery 3/3/23 (L  Doctor) presents for post op follow up     -Patient feels great postoperatively  Denies pain/paresthesias, Denies claudication or rest pain   -No edema  -Left groin incision CDI with sutures  Well approximated  No dehiscence, drainage, erythema, hematoma or signs of infection  Sutures removed at office visit today without incident  -+1 L DP, Doppler L PT signal  -Palpable left femoral pulse  -Patient has not smoked since prior to surgery, congratulated on his efforts      Plan:  - LEAD in 3 months with return OV  (L Doctor)   - AOIL in 3 months (routine 6mo AAA surveillance)  -Continue aspirin Plavix  -Continue statin  -Continued smoking cessation  -Incision care reviewed  -Return to work 4/3/2023  -Cc Dr Hammad Hinojosa Doctor       Diagnoses and all orders for this visit:    Abdominal aortic aneurysm (AAA) 3 0 cm to 5 5 cm in diameter in male  -     VAS abdominal aorta/iliacs; complete study; Future    Atherosclerosis of artery of extremity with rest pain (HCC)  -     VAS lower limb arterial duplex, complete bilateral; Future    Peripheral artery disease (HCC)  -     VAS lower limb arterial duplex, complete bilateral; Future    Occlusion of left femoropopliteal bypass graft, subsequent encounter    Other orders  -     Trelegy Ellipta 100-62 5-25 MCG/ACT inhaler          Subjective:      Patient ID: Lillian Harman is a 61 y o  male  Pt is p/o L fem endart 3/3/23  Pt is taking ASA, Plavix and Simvastatin  Pt is no longer smoking      HPI  See assessment and plan    51-year-old male presents for postop follow-up s/p left CFA repair with interposition graft tapered from distal EIA to profunda 3/3/2023 (AIDA  Doctor)  He presents without complaints  Denies pain or paresthesias  No edema  Motor sensation intact  Denies claudication or rest pain  No wounds  Left groin incision CDI was sutures removed in office today without incident  Patient is compliant with dual antiplatelet (aspirin and Plavix) and statin therapy  Patient has not smoked since 1 week prior to surgery, congratulated on his efforts  Reviewed postop incision care and post op surveillance imaging and follow-up    Patient is eager to return to work  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  At this point patient is healing appropriately  We will discuss with Dr Talib Warner Doctor, however gave tentative return to work date 4/3/2023  Patient without questions  Reviewed signs symptoms to call or return to office  The following portions of the patient's history were reviewed and updated as appropriate: allergies, current medications, past family history, past medical history, past social history, past surgical history and problem list     Review of Systems   Constitutional: Negative  HENT: Negative  Eyes: Negative  Respiratory: Negative  Cardiovascular: Negative  Gastrointestinal: Negative  Endocrine: Negative  Genitourinary: Negative  Musculoskeletal: Negative  Skin: Negative  Allergic/Immunologic: Negative  Neurological: Negative  Hematological: Negative  Psychiatric/Behavioral: Negative  I have reviewed and made appropriate changes to the review of systems input by the medical assistant  Objective:      /82 (BP Location: Right arm, Patient Position: Sitting, Cuff Size: Standard)   Pulse 62   Ht 5' 11" (1 803 m)   Wt 92 4 kg (203 lb 9 6 oz)   BMI 28 40 kg/m²          Physical Exam  Vitals reviewed  Cardiovascular:      Rate and Rhythm: Normal rate        Pulses: Femoral pulses are 2+ on the left side  Dorsalis pedis pulses are 2+ on the right side and 1+ on the left side  Posterior tibial pulses are 2+ on the right side and detected w/ Doppler on the left side  Comments: DP confirmed with doppler  Pulmonary:      Effort: Pulmonary effort is normal  No respiratory distress  Musculoskeletal:         General: Normal range of motion  Right lower leg: No edema  Left lower leg: No edema  Skin:     General: Skin is warm and dry  Capillary Refill: Capillary refill takes less than 2 seconds  Comments: Left groin incision CDI with sutures  Well approximated, no dehiscence, drainage, erythema, bleeding, ecchymosis or hematoma  Neurological:      Mental Status: He is alert and oriented to person, place, and time  Psychiatric:         Behavior: Behavior normal              L groin    I have spent a total time of 25 minutes on 03/14/23 in caring for this patient including Instructions for management, Patient and family education, Importance of tx compliance, Risk factor reductions, Impressions, Counseling / Coordination of care, Documenting in the medical record, Reviewing / ordering tests, medicine, procedures   and Obtaining or reviewing history          Vitals:    03/14/23 0836   BP: 130/82   BP Location: Right arm   Patient Position: Sitting   Cuff Size: Standard   Pulse: 62   Weight: 92 4 kg (203 lb 9 6 oz)   Height: 5' 11" (1 803 m)       Patient Active Problem List   Diagnosis   • Abdominal aortic aneurysm (AAA) 3 0 cm to 5 5 cm in diameter in male   • Atherosclerosis of artery of extremity with rest pain (HCC)   • Tobacco abuse   • Hyperlipemia   • COPD (chronic obstructive pulmonary disease) (HCC)   • Peripheral arterial disease (HCC)   • Femoral-popliteal bypass graft occlusion, left (HCC)   • Femoral artery aneurysm, 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[facility])       Past Surgical History:   Procedure Laterality Date   • BYPASS FEMORAL-POPLITEAL Left 2017   • FEMORAL ENDARTARECTOMY Left 2017   • DC TEAEC W/WO PATCH GRAFT COMMON FEMORAL Left 3/3/2023    Procedure: Left common femoral artery aneurysm repair with interposition graft, 7-4 tapered propaten from distal externial iliac artery to profunda femoral artery;  Surgeon: Kristi Avitia MD;  [facility]: [facility];  Service: Vascular   • VASECTOMY         Family History   Problem Relation Age of Onset   • Heart disease Mother    • Heart attack Father        Social History     Socioeconomic History   • Marital status: Single     Spouse name: Not on file   • Number of children: Not on file   • Years of education: Not on file   • Highest education level: Not on file   Occupational History   • Not on file   Tobacco Use   • Smoking status: Every Day     Packs/day: 0 50     Types: Cigarettes   • Smokeless tobacco: Never   Vaping Use   • Vaping Use: Never used   Substance and Sexual Activity   • Alcohol use: Not Currently   • Drug use: Never   • Sexual activity: Not on file   Other Topics Concern   • Not on file   Social History Narrative   • Not on file     Social Determinants of Health     Financial Resource Strain: Not on file   Food Insecurity: Not on file   Transportation Needs: Not on file   Physical Activity: Not on file   Stress: Not on file   Social Connections: Not on file   Intimate Partner Violence: Not on file   Housing Stability: Not on file       No Known Allergies      Current Outpatient Medications:   •  aspirin 81 mg chewable tablet, Chew 81 mg daily, Disp: , Rfl:   •  clopidogrel (PLAVIX) 75 mg tablet, Take 1 tablet (75 mg total) by mouth daily Do not start before March 5, 2023 , Disp: 30 tablet, Rfl: 3  •  ergocalciferol (ERGOCALCIFEROL) 1 25 MG (89852 UT) capsule, Take 1 capsule every week by oral route for 90 days  , Disp: , Rfl:   •  ezetimibe (ZETIA) 10 mg tablet, Take 10 mg by mouth daily, Disp: , Rfl:   •  Fluticasone-Salmeterol (Advair) 250-50 mcg/dose inhaler, Inhale 1 puff 2 (two) times a day Rinse mouth after use , Disp: , Rfl:   •  metoprolol tartrate (LOPRESSOR) 25 mg tablet, 2 (two) times a day, Disp: , Rfl:   •  simvastatin (ZOCOR) 40 mg tablet, TAKE 1 TABLET BY MOUTH EVERY DAY IN THE EVENING FOR 90 DAYS, Disp: , Rfl:   •  Trelegy Ellipta 100-62 5-25 MCG/ACT inhaler, , Disp: , Rfl:   •  varenicline (CHANTIX) 0 5 mg tablet, Take 0 5 mg by mouth 2 (two) times a day, Disp: , Rfl:   •  acetaminophen (TYLENOL) 325 mg tablet, Take 2 tablets (650 mg total) by mouth every 6 (six) hours as needed for mild pain (Patient not taking: Reported on 3/14/2023), Disp: , Rfl:   •  albuterol (PROVENTIL HFA,VENTOLIN HFA) 90 mcg/act inhaler, Inhale 2 puffs every 4 (four) hours as needed for wheezing (Patient not taking: Reported on 3/14/2023), Disp: 1 Inhaler, Rfl: 0

## 2023-05-17 ENCOUNTER — OFFICE VISIT (OUTPATIENT)
Dept: CARDIOLOGY CLINIC | Facility: CLINIC | Age: 63
End: 2023-05-17

## 2023-05-17 VITALS
SYSTOLIC BLOOD PRESSURE: 150 MMHG | BODY MASS INDEX: 28.84 KG/M2 | WEIGHT: 206 LBS | HEART RATE: 72 BPM | HEIGHT: 71 IN | DIASTOLIC BLOOD PRESSURE: 100 MMHG | OXYGEN SATURATION: 96 %

## 2023-05-17 DIAGNOSIS — I10 PRIMARY HYPERTENSION: Primary | ICD-10-CM

## 2023-05-17 DIAGNOSIS — Z72.0 TOBACCO ABUSE: ICD-10-CM

## 2023-05-17 DIAGNOSIS — E78.2 MIXED HYPERLIPIDEMIA: ICD-10-CM

## 2023-05-17 DIAGNOSIS — I73.9 PERIPHERAL ARTERIAL DISEASE (HCC): ICD-10-CM

## 2023-05-17 RX ORDER — BUDESONIDE, GLYCOPYRROLATE, AND FORMOTEROL FUMARATE 160; 9; 4.8 UG/1; UG/1; UG/1
AEROSOL, METERED RESPIRATORY (INHALATION)
COMMUNITY
Start: 2023-04-17

## 2023-05-17 RX ORDER — ATORVASTATIN CALCIUM 80 MG/1
80 TABLET, FILM COATED ORAL DAILY
COMMUNITY
Start: 2023-04-11

## 2023-05-17 RX ORDER — AMLODIPINE BESYLATE 5 MG/1
5 TABLET ORAL DAILY
COMMUNITY

## 2023-05-17 RX ORDER — VARENICLINE TARTRATE 1 MG/1
1 TABLET, FILM COATED ORAL
COMMUNITY
Start: 2023-03-09

## 2023-05-17 NOTE — PATIENT INSTRUCTIONS
You were seen today in the Cardiology office for follow up  Please continue atorvastatin (Lipitor), ezetimibe (Zetia), aspirin, amlodipine    Your amlodipine wasn't continued after your recent hospitalization  Your blood pressure was quite elevated today  Please restart your amlodipine  Thank you for choosing [address]  Please call our office or use "[employer] with any questions

## 2023-05-17 NOTE — PROGRESS NOTES
[facility]    CHIEF COMPLAINT:   Chief Complaint   Patient presents with   • Follow-up     Still has SOB at times, quit smoking       HPI:  Soha Quezada is a 61 y o  male with a past medical history of tobacco abuse disorder, hypertension, hyperlipidemia, peripheral vascular disease, status post left femoral to above-the-knee popliteal bypass with nonreversed ipsilateral GSV (March 2017), bilateral carotid artery stenosis, abdominal aortic aneurysm  Briefly, he presented on 1/9/2023 to [facility] emergency department with complaints of left lower extremity pain and numbness while walking  Venous duplex was negative for DVT but noted a left fem-pop bypass occlusion  He initially saw me for preoperative risk assessment in February 2023 for occluded bypass and thrombosed common femoral artery aneurysm  He underwent elective left common femoral artery aneurysm repair with interposition graft tapered from distal EIA to profunda femoral artery on 3/3/2023  His postoperative course was uncomplicated  Interval history: He is doing well since his recent surgery  He has followed up with vascular surgery  Now back to work  He quit smoking 1 week prior to his planned procedure  He is currently taking varenicline and uses [employer] when he has an urgs to smoke  He does report some shortness of breath as prior but feels that his breathing is sometimes better  He does cough and bring up mucous occasionally since quitting smoking  He denies any lightheadedness, visual changes, chest pain, palpitations, orthopnea      The following portions of the patient's history were reviewed and updated as appropriate: allergies, current medications, past family history, past medical history, past social history, past surgical history, and problem list     SINCE LAST OV I REVIEWED WITH THE PATIENT THE INTERIM LABS, TEST RESULTS, CONSULTANT(S) NOTES AND PERFORMED AN INTERIM REVIEW OF HISTORY    Past Medical History:   Diagnosis Date   • COPD (chronic obstructive pulmonary disease) ([facility] [address] )        Past Surgical History:   Procedure Laterality Date   • BYPASS FEMORAL-POPLITEAL Left 2017   • FEMORAL ENDARTARECTOMY Left 2017   • CA TEAEC W/WO PATCH GRAFT COMMON FEMORAL Left 3/3/2023    Procedure: Left common femoral artery aneurysm repair with interposition graft, 7-4 tapered propaten from distal externial iliac artery to profunda femoral artery;  Surgeon: Saida Barnhart MD;  Location: [facility];  Service: Vascular   • VASECTOMY         Social History     Socioeconomic History   • Marital status: Single     Spouse name: Not on file   • Number of children: Not on file   • Years of education: Not on file   • Highest education level: Not on file   Occupational History   • Not on file   Tobacco Use   • Smoking status: Former     Packs/day: 0 50     Types: Cigarettes     Quit date: 2023     Years since quittin 2   • Smokeless tobacco: Never   Vaping Use   • Vaping Use: Never used   Substance and Sexual Activity   • Alcohol use: Not Currently   • Drug use: Never   • Sexual activity: Not on file   Other Topics Concern   • Not on file   Social History Narrative   • Not on file     Social Determinants of Health     Financial Resource Strain: Not on file   Food Insecurity: Not on file   Transportation Needs: Not on file   Physical Activity: Not on file   Stress: Not on file   Social Connections: Not on file   Intimate Partner Violence: Not on file   Housing Stability: Not on file       Family History   Problem Relation Age of Onset   • Heart disease Mother    • Heart attack Father        No Known Allergies    Current Outpatient Medications   Medication Sig Dispense Refill   • amLODIPine (NORVASC) 5 mg tablet Take 5 mg by mouth daily     • aspirin 81 mg chewable tablet Chew 81 mg daily     • atorvastatin (LIPITOR) 80 mg tablet Take 80 mg by mouth daily     • Breztri Aerosphere 160-9-4 8 MCG/ACT AERO "INHALE 2 PUFFS INTO THE LUNGS TWICE A DAY FOR 30 DAYS     • clopidogrel (PLAVIX) 75 mg tablet Take 1 tablet (75 mg total) by mouth daily Do not start before March 5, 2023  30 tablet 3   • ergocalciferol (ERGOCALCIFEROL) 1 25 MG (44170 UT) capsule Take 1 capsule every week by oral route for 90 days  • ezetimibe (ZETIA) 10 mg tablet Take 10 mg by mouth daily     • varenicline (CHANTIX) 1 mg tablet Take 1 mg by mouth 2 (two) times daily after meals     • metoprolol tartrate (LOPRESSOR) 25 mg tablet 2 (two) times a day       No current facility-administered medications for this visit  /100   Pulse 72   Ht 5' 11\" (1 803 m)   Wt 93 4 kg (206 lb)   SpO2 96%   BMI 28 73 kg/m²     Review of Systems   All other systems reviewed and are negative  Physical Exam  Vitals reviewed  Constitutional:       General: He is not in acute distress  Appearance: He is well-developed  He is not toxic-appearing  HENT:      Head: Normocephalic and atraumatic  Eyes:      General: No scleral icterus  Extraocular Movements: Extraocular movements intact  Conjunctiva/sclera: Conjunctivae normal    Cardiovascular:      Rate and Rhythm: Normal rate and regular rhythm  Pulses: Normal pulses  Heart sounds: Normal heart sounds  No murmur heard  No gallop  Pulmonary:      Effort: Pulmonary effort is normal  No respiratory distress  Breath sounds: Normal breath sounds  No wheezing or rales  Abdominal:      General: Abdomen is flat  Bowel sounds are normal  There is no distension  Palpations: Abdomen is soft  Tenderness: There is no abdominal tenderness  Musculoskeletal:         General: No swelling or tenderness  Right lower leg: No edema  Left lower leg: No edema  Skin:     General: Skin is warm and dry  Coloration: Skin is not jaundiced or pale  Neurological:      Mental Status: He is alert     Psychiatric:         Mood and Affect: Mood normal          " Behavior: Behavior normal         Lab Results   Component Value Date    K 3 9 03/04/2023     03/04/2023    CO2 26 03/04/2023    BUN 13 03/04/2023    CREATININE 0 82 03/04/2023    CALCIUM 8 9 03/04/2023    ALT 8 02/24/2023    AST 12 (L) 02/24/2023    INR 1 08 03/04/2023       Lab Results   Component Value Date    WBC 12 72 (H) 03/04/2023    HGB 13 7 03/04/2023    HCT 41 5 03/04/2023     03/04/2023       Cardiac studies:   TTE-2/15/2023: Normal left ventricular cavity size and wall thickness  Normal systolic function  LVEF 55%  Grade 1 diastolic dysfunction  Normal right ventricular cavity size and systolic function  Normal left and right atrial size  Mild mitral regurgitation  ASSESSMENT AND PLAN:  Makayla Roca was seen today for follow-up  Diagnoses and all orders for this visit:    Primary hypertension: Blood pressure was elevated in the office today 150/100  It appears his amlodipine was discontinued after his recent hospitalization  I have asked him to resume his amlodipine 5 mg daily  If his blood pressure remains elevated on subsequent follow-ups I do recommend the addition of another antihypertensive agent  Can consider beta blocker with more antihypertensive effect or ARB given aneurysmal disease  Renal function on last labs Scr 0 82 and eGFR 94   -Dyspnea on exertion likely due to underlying pulmonary disease  He denies any history of TIA/CVA or CHF  History of MI listed in his Care Everywhere but he denies this  Prior NM MPI in Feb 2017 reported normal myocardial perfusion  Tobacco abuse: He quit smoking approximately 1 week prior to his recent procedure  He is still taking varenicline  Mixed hyperlipidemia: Repeat lipid panel from 2/24/2023: Total cholesterol 262, TGs 91, HDL 37,   He was recently switched from Zocor 40 mg to atorvastatin 80 mg daily  He will continue this along with ezetimibe 10 mg daily    Repeat lipid panel and 3-6 months to assess if at goal     Peripheral arterial disease ([facility] [address] ): He has a history of AAA, left iliac artery aneurysm, left femoral endarterectomy and femoral- AK pop bypass with nonreversed ipsilateral GSV (2017)  L fem=pop bypass occlusion now status post left CFA aneurysm repair with interposition graft tapered from distal EIA to profunda femoral artery on 3/3/2023 with Dr Wojciech Morocho Doctor  Needs aggressive risk factor modification  Blood pressure goal <130/80 and LDL goal <70  Continue surveillance of AAA and carotid artery disease      Jeannette Manzano MD

## 2023-06-06 ENCOUNTER — HOSPITAL ENCOUNTER (OUTPATIENT)
Dept: NON INVASIVE DIAGNOSTICS | Facility: CLINIC | Age: 63
Discharge: HOME | End: 2023-06-06
Payer: COMMERCIAL

## 2023-06-06 DIAGNOSIS — I73.9 PERIPHERAL ARTERY DISEASE (HCC): ICD-10-CM

## 2023-06-06 DIAGNOSIS — I70.229 ATHEROSCLEROSIS OF ARTERY OF EXTREMITY WITH REST PAIN (HCC): ICD-10-CM

## 2023-06-06 DIAGNOSIS — I71.40 ABDOMINAL AORTIC ANEURYSM (AAA) 3.0 CM TO 5.5 CM IN DIAMETER IN MALE (HCC): ICD-10-CM

## 2023-06-06 PROCEDURE — 93925 LOWER EXTREMITY STUDY: CPT

## 2023-06-06 PROCEDURE — 93978 VASCULAR STUDY: CPT

## 2023-06-06 PROCEDURE — 93923 UPR/LXTR ART STDY 3+ LVLS: CPT

## 2023-06-08 PROCEDURE — 93925 LOWER EXTREMITY STUDY: CPT | Performed by: SURGERY

## 2023-06-08 PROCEDURE — 93923 UPR/LXTR ART STDY 3+ LVLS: CPT | Performed by: SURGERY

## 2023-06-08 PROCEDURE — 93978 VASCULAR STUDY: CPT | Performed by: SURGERY

## 2023-06-14 NOTE — PROGRESS NOTES
Assessment/Plan:    Pt is a 62 yo M w/ COPD, HLD, HTN, AAA, PAD s/p L fem endart and fem-AK pop bypass w/ non-reversed GSV Ju '17, presents w/ LLE rest pain and occluded bypass s/p L EIA-profunda interposition graft 3/3/23    Femoral artery aneurysm, left (HCC)  Occlusion of left femoropopliteal bypass graft, subsequent encounter  Peripheral arterial disease (HCC)  -     VAS lower limb arterial duplex, complete bilateral; Future  -Left common femoral endarterectomy, left femoral to above-knee popliteal artery bypass with non-reversed GSV, 3/1/17, Dr Becka Adair  -reviewed CTA which shows 4 3cm AAA, 2 4cm L IIA aneurysm, 2 7cm L CFA aneurysm, 1 3cm L popliteal aneurysm; the fem-AK pop bypass is occluded; the L CFA and aneurysm are occluded; the profunda recons at the first branch point; the BK popliteal has significant occlusive disease; there is runoff w/ best target the PT but it is small; does not have L GSV  -s/p L femoral endarterectomy and interposition graft to profunda for aneurysm 3/3/23  -doing very well; incision healed, rest pain resolved; claudication improved to 3blocks from 1/2block  -reviewed LEADs which show R: 0 93/200/118 and L: 0 78/57/45 (significantly improved) w/ widely patent L femoral interposition graft, chronic L SFA occlusion, L pop aneurysm 1 3cm  -will continue surveillance on a 6 month basis  -f/u 1 year    Abdominal aortic aneurysm (AAA) 3 0 cm to 5 5 cm in diameter in male [facility])  Bilateral iliac artery aneurysm (HCC)  -     VAS abdominal aorta/iliacs; complete study;  Future  -reviewed CTA which shows 4 3cm AAA and 2 4 L IIA aneurysm  -reviewed AOIL which shows AAA 4 7, iliacs R: 2 2 (2 1), L: 3 0 (2 5)  -these do not meet size criteria for repair at this time  -will continue to monitor these w/ DU on a 6 month basis    Former smoker  -quit smoking 1 wk prior to surgery; commended on his excellent work (1 5PPD)    Mixed hyperlipidemia  Medications  -continue ASA/statin  -started plavix at time of surgery; will continue this given prior bypass occlusion; is getting some bruising    Subjective:      Patient ID: Blake Orellana is a 61 y o  male  Pt present for review of AOIL & LEAD done on 6/6/23  Pt c/o cramping/pain when walking(sometimes)  Pt is currently taking Atorvastatin, ASA, Plavix  Pt is a former smoker  HPI:    Patient was prior [facility] patient  He had a L fem-AK pop bypass at [facility] in '17  He presented to the ER 1/9/23 with new onset LLE numbness  He had a venous duplex which showed that his bypass graft was occluded, but the ER concluded that this wasn't an acute finding and discharged him with a recommendation for short interval vascular followup outpatient  He was seen by Aamir Man about a week later and she ordered a CTA w/ runoff  After this, we discussed possible revascularization options and he underwent L femoral interposition graft to the profunda  Since surgery, his L foot rest pain is resolved  He continues to have claudication in the L calf at about 2-3 blocks (prior was 1/2 block)  His numbness is significantly improved and can feel his foot in his boots now  He is back to work  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  No family hx of aneurysm  Quit smoking since 1 wk prior to surgery (was 1 5PPD)  (Also tried vaping but quit this as well)  Hx of illicit drug use in the 70s  Was a heavy EtOH user about 5 years ago  Takes ASA and simvastatin and now plavix  The following portions of the patient's history were reviewed and updated as appropriate: allergies, current medications, past family history, past medical history, past social history, past surgical history and problem list     Review of Systems   Respiratory: Positive for cough and shortness of breath  Cardiovascular: Negative  Negative for chest pain and leg swelling  Musculoskeletal: Negative for gait problem  Skin: Negative  Negative for wound  "  Neurological: Negative for weakness and numbness  Objective:      /82 (BP Location: Right arm, Patient Position: Sitting, Cuff Size: Standard)   Pulse 63   Ht 5' 11\" (1 803 m)   Wt 94 8 kg (209 lb)   SpO2 98%   BMI 29 15 kg/m²          Physical Exam  Cardiovascular:      Rate and Rhythm: Normal rate and regular rhythm  Pulses:           Radial pulses are 2+ on the right side and 2+ on the left side  Dorsalis pedis pulses are 2+ on the right side and 0 on the left side  Posterior tibial pulses are 1+ on the right side and 1+ on the left side  Heart sounds: No murmur heard  Comments: No carotid bruits  Pulmonary:      Effort: No respiratory distress  Breath sounds: No wheezing or rales  Abdominal:      Palpations: There is pulsatile mass (nontender to direct palpation)  Musculoskeletal:      Right lower leg: No edema  Left lower leg: No edema  Skin:     Comments: No foot wound  L groin incision well healed           I have reviewed and made appropriate changes to the review of systems input by the medical assistant      Vitals:    06/15/23 0859   BP: 166/82   BP Location: Right arm   Patient Position: Sitting   Cuff Size: Standard   Pulse: 63   SpO2: 98%   Weight: 94 8 kg (209 lb)   Height: 5' 11\" (1 803 m)       Patient Active Problem List   Diagnosis   • Abdominal aortic aneurysm (AAA) 3 0 cm to 5 5 cm in diameter in [facility])   • Atherosclerosis of artery of extremity with rest pain (HCC)   • Tobacco abuse   • Hyperlipemia   • COPD (chronic obstructive pulmonary disease) (HCC)   • Peripheral arterial disease (HCC)   • Femoral-popliteal bypass graft occlusion, left (HCC)   • Femoral artery aneurysm, left (HCC)   • Primary hypertension       Past Surgical History:   Procedure Laterality Date   • BYPASS FEMORAL-POPLITEAL Left 2017   • FEMORAL ENDARTARECTOMY Left 2017   • PA TEAEC W/WO PATCH GRAFT COMMON FEMORAL Left 3/3/2023    Procedure: Left common " femoral artery aneurysm repair with interposition graft, 7-4 tapered propaten from distal externial iliac artery to profunda femoral artery;  Surgeon: Carlos Patel MD;  Location: [facility];  Service: Vascular   • VASECTOMY         Family History   Problem Relation Age of Onset   • Heart disease Mother    • Heart attack Father        Social History     Socioeconomic History   • Marital status: Single     Spouse name: Not on file   • Number of children: Not on file   • Years of education: Not on file   • Highest education level: Not on file   Occupational History   • Not on file   Tobacco Use   • Smoking status: Former     Packs/day: 0 50     Types: Cigarettes     Quit date: 2023     Years since quittin 3   • Smokeless tobacco: Never   Vaping Use   • Vaping Use: Never used   Substance and Sexual Activity   • Alcohol use: Not Currently   • Drug use: Never   • Sexual activity: Not on file   Other Topics Concern   • Not on file   Social History Narrative   • Not on file     Social Determinants of Health     Financial Resource Strain: Not on file   Food Insecurity: Not on file   Transportation Needs: Not on file   Physical Activity: Not on file   Stress: Not on file   Social Connections: Not on file   Intimate Partner Violence: Not on file   Housing Stability: Not on file       No Known Allergies      Current Outpatient Medications:   •  amLODIPine (NORVASC) 5 mg tablet, Take 5 mg by mouth daily, Disp: , Rfl:   •  aspirin 81 mg chewable tablet, Chew 81 mg daily, Disp: , Rfl:   •  atorvastatin (LIPITOR) 80 mg tablet, Take 80 mg by mouth daily, Disp: , Rfl:   •  Breztri Aerosphere 160-9-4 8 MCG/ACT AERO, INHALE 2 PUFFS INTO THE LUNGS TWICE A DAY FOR 30 DAYS, Disp: , Rfl:   •  clopidogrel (PLAVIX) 75 mg tablet, Take 1 tablet (75 mg total) by mouth daily Do not start before 2023 , Disp: 30 tablet, Rfl: 3  •  ergocalciferol (ERGOCALCIFEROL) 1 25 MG (41800 UT) capsule, Take 1 capsule every week by oral route for 90 days  , Disp: , Rfl:   •  ezetimibe (ZETIA) 10 mg tablet, Take 10 mg by mouth daily, Disp: , Rfl:   •  metoprolol tartrate (LOPRESSOR) 25 mg tablet, 2 (two) times a day, Disp: , Rfl:   •  varenicline (CHANTIX) 1 mg tablet, Take 1 mg by mouth 2 (two) times daily after meals, Disp: , Rfl:

## 2023-06-15 ENCOUNTER — OFFICE VISIT (OUTPATIENT)
Dept: VASCULAR SURGERY | Facility: CLINIC | Age: 63
End: 2023-06-15
Payer: COMMERCIAL

## 2023-06-15 VITALS
HEIGHT: 71 IN | HEART RATE: 63 BPM | SYSTOLIC BLOOD PRESSURE: 166 MMHG | WEIGHT: 209 LBS | DIASTOLIC BLOOD PRESSURE: 82 MMHG | OXYGEN SATURATION: 98 % | BODY MASS INDEX: 29.26 KG/M2

## 2023-06-15 DIAGNOSIS — T82.898D OCCLUSION OF LEFT FEMOROPOPLITEAL BYPASS GRAFT, SUBSEQUENT ENCOUNTER: ICD-10-CM

## 2023-06-15 DIAGNOSIS — Z87.891 FORMER SMOKER: ICD-10-CM

## 2023-06-15 DIAGNOSIS — I71.40 ABDOMINAL AORTIC ANEURYSM (AAA) 3.0 CM TO 5.5 CM IN DIAMETER IN MALE (HCC): ICD-10-CM

## 2023-06-15 DIAGNOSIS — E78.2 MIXED HYPERLIPIDEMIA: ICD-10-CM

## 2023-06-15 DIAGNOSIS — I72.4 FEMORAL ARTERY ANEURYSM, LEFT (HCC): Primary | ICD-10-CM

## 2023-06-15 DIAGNOSIS — I73.9 PERIPHERAL ARTERIAL DISEASE (HCC): ICD-10-CM

## 2023-06-15 DIAGNOSIS — I72.3 BILATERAL ILIAC ARTERY ANEURYSM (HCC): ICD-10-CM

## 2023-06-15 PROBLEM — I70.229 ATHEROSCLEROSIS OF ARTERY OF EXTREMITY WITH REST PAIN (HCC): Status: RESOLVED | Noted: 2023-01-15 | Resolved: 2023-06-15

## 2023-06-15 PROBLEM — I70.229 ATHEROSCLEROSIS OF ARTERY OF EXTREMITY WITH REST PAIN (HCC): Status: RESOLVED | Noted: 2023-01-01 | Resolved: 2023-01-01

## 2023-06-15 PROCEDURE — 99215 OFFICE O/P EST HI 40 MIN: CPT | Performed by: SURGERY

## 2023-06-15 NOTE — PATIENT INSTRUCTIONS
1) PAD  -you have a blockage in the bypass as well as your artery in the groin area and several aneurysms  -we did a successful surgery for your left groin aneurysm and the ultrasound looks great  -we will do another ultrasound test in 6 months    2) Medications  -please continue taking aspirin, plavix and statin medications    3) Tobacco  -you did a fantastic job quitting smoking!!! Keep up the good work!     4) AAA  -you also have aneurysms in your abdomen and pelvis area  -your ultrasound showed slight growth but I don't recommend surgery at this current size  -we are going to monitor these on a 6 months basis

## 2023-12-29 ENCOUNTER — HOSPITAL ENCOUNTER (OUTPATIENT)
Dept: NON INVASIVE DIAGNOSTICS | Facility: CLINIC | Age: 63
Discharge: HOME | End: 2023-12-29
Payer: COMMERCIAL

## 2023-12-29 DIAGNOSIS — I72.4 FEMORAL ARTERY ANEURYSM, LEFT (HCC): ICD-10-CM

## 2023-12-29 DIAGNOSIS — I72.3 BILATERAL ILIAC ARTERY ANEURYSM (HCC): ICD-10-CM

## 2023-12-29 DIAGNOSIS — I73.9 PERIPHERAL ARTERIAL DISEASE (HCC): ICD-10-CM

## 2023-12-29 DIAGNOSIS — I71.40 ABDOMINAL AORTIC ANEURYSM (AAA) 3.0 CM TO 5.5 CM IN DIAMETER IN MALE (HCC): ICD-10-CM

## 2023-12-29 DIAGNOSIS — T82.898D OCCLUSION OF LEFT FEMOROPOPLITEAL BYPASS GRAFT, SUBSEQUENT ENCOUNTER: ICD-10-CM

## 2023-12-29 PROCEDURE — 93925 LOWER EXTREMITY STUDY: CPT

## 2023-12-29 PROCEDURE — 93923 UPR/LXTR ART STDY 3+ LVLS: CPT

## 2023-12-29 PROCEDURE — 93978 VASCULAR STUDY: CPT

## 2023-12-30 PROCEDURE — 93925 LOWER EXTREMITY STUDY: CPT | Performed by: SURGERY

## 2023-12-30 PROCEDURE — 93978 VASCULAR STUDY: CPT | Performed by: SURGERY

## 2023-12-30 PROCEDURE — 93922 UPR/L XTREMITY ART 2 LEVELS: CPT | Performed by: SURGERY

## 2024-01-01 ENCOUNTER — HOSPITAL ENCOUNTER (INPATIENT)
Facility: HOSPITAL | Age: 64
LOS: 2 days | DRG: 871 | End: 2024-01-05
Attending: STUDENT IN AN ORGANIZED HEALTH CARE EDUCATION/TRAINING PROGRAM | Admitting: INTERNAL MEDICINE
Payer: COMMERCIAL

## 2024-01-01 ENCOUNTER — APPOINTMENT (EMERGENCY)
Dept: CT IMAGING | Facility: HOSPITAL | Age: 64
DRG: 871 | End: 2024-01-01
Payer: COMMERCIAL

## 2024-01-01 ENCOUNTER — APPOINTMENT (INPATIENT)
Dept: RADIOLOGY | Facility: HOSPITAL | Age: 64
DRG: 871 | End: 2024-01-01
Payer: COMMERCIAL

## 2024-01-01 ENCOUNTER — APPOINTMENT (OUTPATIENT)
Dept: RADIOLOGY | Facility: HOSPITAL | Age: 64
DRG: 871 | End: 2024-01-01
Payer: COMMERCIAL

## 2024-01-01 ENCOUNTER — APPOINTMENT (INPATIENT)
Dept: MRI IMAGING | Facility: HOSPITAL | Age: 64
DRG: 871 | End: 2024-01-01
Payer: COMMERCIAL

## 2024-01-01 ENCOUNTER — APPOINTMENT (INPATIENT)
Dept: CT IMAGING | Facility: HOSPITAL | Age: 64
DRG: 871 | End: 2024-01-01
Payer: COMMERCIAL

## 2024-01-01 VITALS
HEART RATE: 98 BPM | RESPIRATION RATE: 25 BRPM | HEIGHT: 68 IN | BODY MASS INDEX: 26.03 KG/M2 | DIASTOLIC BLOOD PRESSURE: 57 MMHG | WEIGHT: 171.74 LBS | OXYGEN SATURATION: 69 % | TEMPERATURE: 98.6 F | SYSTOLIC BLOOD PRESSURE: 93 MMHG

## 2024-01-01 DIAGNOSIS — J98.4 CAVITATING MASS IN RIGHT UPPER LUNG LOBE: ICD-10-CM

## 2024-01-01 DIAGNOSIS — J96.01 ACUTE RESPIRATORY FAILURE WITH HYPOXIA AND HYPERCAPNIA (HCC): ICD-10-CM

## 2024-01-01 DIAGNOSIS — A41.9 SEPSIS, DUE TO UNSPECIFIED ORGANISM, UNSPECIFIED WHETHER ACUTE ORGAN DYSFUNCTION PRESENT (HCC): Primary | ICD-10-CM

## 2024-01-01 DIAGNOSIS — J96.02 ACUTE RESPIRATORY FAILURE WITH HYPOXIA AND HYPERCAPNIA (HCC): ICD-10-CM

## 2024-01-01 LAB
2HR DELTA HS TROPONIN: -146 NG/L
2HR DELTA HS TROPONIN: -193 NG/L
4HR DELTA HS TROPONIN: -130 NG/L
ABO GROUP BLD: NORMAL
ABO GROUP BLD: NORMAL
ACANTHOCYTES BLD QL SMEAR: PRESENT
ALBUMIN SERPL BCP-MCNC: 2.5 G/DL (ref 3.5–5)
ALBUMIN SERPL BCP-MCNC: 2.5 G/DL (ref 3.5–5)
ALBUMIN SERPL BCP-MCNC: 2.9 G/DL (ref 3.5–5)
ALP SERPL-CCNC: 129 U/L (ref 34–104)
ALP SERPL-CCNC: 139 U/L (ref 34–104)
ALP SERPL-CCNC: 146 U/L (ref 34–104)
ALT SERPL W P-5'-P-CCNC: 35 U/L (ref 7–52)
ALT SERPL W P-5'-P-CCNC: 48 U/L (ref 7–52)
ALT SERPL W P-5'-P-CCNC: 59 U/L (ref 7–52)
AMPHETAMINES SERPL QL SCN: NEGATIVE
ANION GAP SERPL CALCULATED.3IONS-SCNC: 12 MMOL/L
ANION GAP SERPL CALCULATED.3IONS-SCNC: 14 MMOL/L
ANION GAP SERPL CALCULATED.3IONS-SCNC: 17 MMOL/L
ANION GAP SERPL CALCULATED.3IONS-SCNC: 17 MMOL/L
ANION GAP SERPL CALCULATED.3IONS-SCNC: 19 MMOL/L
ANISOCYTOSIS BLD QL SMEAR: PRESENT
ANISOCYTOSIS BLD QL SMEAR: PRESENT
APAP SERPL-MCNC: <2 UG/ML (ref 10–20)
APTT PPP: 50 SECONDS (ref 23–37)
ARTERIAL PATENCY WRIST A: NO
AST SERPL W P-5'-P-CCNC: 221 U/L (ref 13–39)
AST SERPL W P-5'-P-CCNC: 325 U/L (ref 13–39)
AST SERPL W P-5'-P-CCNC: 336 U/L (ref 13–39)
ATRIAL RATE: 50 BPM
ATRIAL RATE: 93 BPM
BACTERIA UR CULT: NORMAL
BACTERIA UR QL AUTO: ABNORMAL /HPF
BARBITURATES UR QL: NEGATIVE
BASE EX.OXY STD BLDV CALC-SCNC: 47.4 % (ref 60–80)
BASE EX.OXY STD BLDV CALC-SCNC: 48.7 % (ref 60–80)
BASE EX.OXY STD BLDV CALC-SCNC: 51.3 % (ref 60–80)
BASE EXCESS BLDA CALC-SCNC: -7 MMOL/L (ref -2–3)
BASE EXCESS BLDA CALC-SCNC: -7.2 MMOL/L
BASE EXCESS BLDV CALC-SCNC: -2.1 MMOL/L
BASE EXCESS BLDV CALC-SCNC: -3.1 MMOL/L
BASE EXCESS BLDV CALC-SCNC: -6.5 MMOL/L
BASOPHILS # BLD MANUAL: 0 THOUSAND/UL (ref 0–0.1)
BASOPHILS # BLD MANUAL: 0 THOUSAND/UL (ref 0–0.1)
BASOPHILS NFR MAR MANUAL: 0 % (ref 0–1)
BASOPHILS NFR MAR MANUAL: 0 % (ref 0–1)
BENZODIAZ UR QL: NEGATIVE
BILIRUB SERPL-MCNC: 2.31 MG/DL (ref 0.2–1)
BILIRUB SERPL-MCNC: 2.32 MG/DL (ref 0.2–1)
BILIRUB SERPL-MCNC: 2.41 MG/DL (ref 0.2–1)
BILIRUB UR QL STRIP: NEGATIVE
BLD GP AB SCN SERPL QL: NEGATIVE
BUN SERPL-MCNC: 42 MG/DL (ref 5–25)
BUN SERPL-MCNC: 47 MG/DL (ref 5–25)
BUN SERPL-MCNC: 51 MG/DL (ref 5–25)
BUN SERPL-MCNC: 52 MG/DL (ref 5–25)
BUN SERPL-MCNC: 56 MG/DL (ref 5–25)
BURR CELLS BLD QL SMEAR: PRESENT
BURR CELLS BLD QL SMEAR: PRESENT
CA-I BLD-SCNC: 1.31 MMOL/L (ref 1.12–1.32)
CA-I BLD-SCNC: 1.48 MMOL/L (ref 1.12–1.32)
CALCIUM ALBUM COR SERPL-MCNC: 11.4 MG/DL (ref 8.3–10.1)
CALCIUM ALBUM COR SERPL-MCNC: 11.7 MG/DL (ref 8.3–10.1)
CALCIUM ALBUM COR SERPL-MCNC: 12.7 MG/DL (ref 8.3–10.1)
CALCIUM SERPL-MCNC: 10.2 MG/DL (ref 8.4–10.2)
CALCIUM SERPL-MCNC: 10.5 MG/DL (ref 8.4–10.2)
CALCIUM SERPL-MCNC: 11.8 MG/DL (ref 8.4–10.2)
CALCIUM SERPL-MCNC: 9.2 MG/DL (ref 8.4–10.2)
CALCIUM SERPL-MCNC: 9.3 MG/DL (ref 8.4–10.2)
CARDIAC TROPONIN I PNL SERPL HS: 1062 NG/L
CARDIAC TROPONIN I PNL SERPL HS: 669 NG/L
CARDIAC TROPONIN I PNL SERPL HS: 732 NG/L
CARDIAC TROPONIN I PNL SERPL HS: 862 NG/L
CARDIAC TROPONIN I PNL SERPL HS: 916 NG/L
CHLORIDE SERPL-SCNC: 101 MMOL/L (ref 96–108)
CHLORIDE SERPL-SCNC: 94 MMOL/L (ref 96–108)
CHLORIDE SERPL-SCNC: 95 MMOL/L (ref 96–108)
CHLORIDE SERPL-SCNC: 98 MMOL/L (ref 96–108)
CHLORIDE SERPL-SCNC: 99 MMOL/L (ref 96–108)
CK SERPL-CCNC: 5812 U/L (ref 39–308)
CK SERPL-CCNC: 6013 U/L (ref 39–308)
CK SERPL-CCNC: 8880 U/L (ref 39–308)
CK SERPL-CCNC: ABNORMAL U/L (ref 39–308)
CLARITY UR: ABNORMAL
CO2 SERPL-SCNC: 21 MMOL/L (ref 21–32)
CO2 SERPL-SCNC: 23 MMOL/L (ref 21–32)
CO2 SERPL-SCNC: 23 MMOL/L (ref 21–32)
CO2 SERPL-SCNC: 25 MMOL/L (ref 21–32)
CO2 SERPL-SCNC: 26 MMOL/L (ref 21–32)
COCAINE UR QL: NEGATIVE
COLOR UR: YELLOW
CREAT SERPL-MCNC: 1.73 MG/DL (ref 0.6–1.3)
CREAT SERPL-MCNC: 1.87 MG/DL (ref 0.6–1.3)
CREAT SERPL-MCNC: 1.9 MG/DL (ref 0.6–1.3)
CREAT SERPL-MCNC: 1.98 MG/DL (ref 0.6–1.3)
CREAT SERPL-MCNC: 2.12 MG/DL (ref 0.6–1.3)
EOSINOPHIL # BLD MANUAL: 0 THOUSAND/UL (ref 0–0.4)
EOSINOPHIL # BLD MANUAL: 0.4 THOUSAND/UL (ref 0–0.4)
EOSINOPHIL NFR BLD MANUAL: 0 % (ref 0–6)
EOSINOPHIL NFR BLD MANUAL: 1 % (ref 0–6)
ERYTHROCYTE [DISTWIDTH] IN BLOOD BY AUTOMATED COUNT: 17.2 % (ref 11.6–15.1)
ERYTHROCYTE [DISTWIDTH] IN BLOOD BY AUTOMATED COUNT: 17.2 % (ref 11.6–15.1)
ERYTHROCYTE [DISTWIDTH] IN BLOOD BY AUTOMATED COUNT: 17.4 % (ref 11.6–15.1)
ETHANOL SERPL-MCNC: 15 MG/DL
GFR SERPL CREATININE-BSD FRML MDRD: 32 ML/MIN/1.73SQ M
GFR SERPL CREATININE-BSD FRML MDRD: 34 ML/MIN/1.73SQ M
GFR SERPL CREATININE-BSD FRML MDRD: 36 ML/MIN/1.73SQ M
GFR SERPL CREATININE-BSD FRML MDRD: 37 ML/MIN/1.73SQ M
GFR SERPL CREATININE-BSD FRML MDRD: 41 ML/MIN/1.73SQ M
GIANT PLATELETS BLD QL SMEAR: PRESENT
GLUCOSE SERPL-MCNC: 103 MG/DL (ref 65–140)
GLUCOSE SERPL-MCNC: 118 MG/DL (ref 65–140)
GLUCOSE SERPL-MCNC: 67 MG/DL (ref 65–140)
GLUCOSE SERPL-MCNC: 73 MG/DL (ref 65–140)
GLUCOSE SERPL-MCNC: 73 MG/DL (ref 65–140)
GLUCOSE SERPL-MCNC: 86 MG/DL (ref 65–140)
GLUCOSE SERPL-MCNC: 89 MG/DL (ref 65–140)
GLUCOSE SERPL-MCNC: 92 MG/DL (ref 65–140)
GLUCOSE UR STRIP-MCNC: ABNORMAL MG/DL
GRAN CASTS #/AREA URNS LPF: ABNORMAL /[LPF]
HCO3 BLDA-SCNC: 20.5 MMOL/L (ref 24–30)
HCO3 BLDA-SCNC: 21.1 MMOL/L (ref 22–28)
HCO3 BLDV-SCNC: 20.6 MMOL/L (ref 24–30)
HCO3 BLDV-SCNC: 24.9 MMOL/L (ref 24–30)
HCO3 BLDV-SCNC: 25.1 MMOL/L (ref 24–30)
HCT VFR BLD AUTO: 30.3 % (ref 36.5–49.3)
HCT VFR BLD AUTO: 34.3 % (ref 36.5–49.3)
HCT VFR BLD AUTO: 34.6 % (ref 36.5–49.3)
HCT VFR BLD CALC: 34 % (ref 36.5–49.3)
HGB BLD-MCNC: 10.3 G/DL (ref 12–17)
HGB BLD-MCNC: 10.3 G/DL (ref 12–17)
HGB BLD-MCNC: 9.6 G/DL (ref 12–17)
HGB BLDA-MCNC: 11.6 G/DL (ref 12–17)
HGB UR QL STRIP.AUTO: ABNORMAL
HOROWITZ INDEX BLDA+IHG-RTO: 100 MM[HG]
HOROWITZ INDEX BLDA+IHG-RTO: 70 MM[HG]
HOROWITZ INDEX BLDA+IHG-RTO: 80 MM[HG]
INR PPP: 2.28 (ref 0.84–1.19)
KETONES UR STRIP-MCNC: NEGATIVE MG/DL
LACTATE SERPL-SCNC: 2.8 MMOL/L (ref 0.5–2)
LACTATE SERPL-SCNC: 3.2 MMOL/L (ref 0.5–2)
LACTATE SERPL-SCNC: 3.6 MMOL/L (ref 0.5–2)
LACTATE SERPL-SCNC: 3.6 MMOL/L (ref 0.5–2)
LACTATE SERPL-SCNC: 4 MMOL/L (ref 0.5–2)
LACTATE SERPL-SCNC: 4.2 MMOL/L (ref 0.5–2)
LACTATE SERPL-SCNC: 7.5 MMOL/L (ref 0.5–2)
LACTATE SERPL-SCNC: 9.1 MMOL/L (ref 0.5–2)
LEUKOCYTE ESTERASE UR QL STRIP: NEGATIVE
LG PLATELETS BLD QL SMEAR: PRESENT
LYMPHOCYTES # BLD AUTO: 0.8 THOUSAND/UL (ref 0.6–4.47)
LYMPHOCYTES # BLD AUTO: 1 % (ref 14–44)
LYMPHOCYTES # BLD AUTO: 2.12 THOUSAND/UL (ref 0.6–4.47)
LYMPHOCYTES # BLD AUTO: 7 % (ref 14–44)
MAGNESIUM SERPL-MCNC: 2.7 MG/DL (ref 1.9–2.7)
MAGNESIUM SERPL-MCNC: 2.8 MG/DL (ref 1.9–2.7)
MAGNESIUM SERPL-MCNC: 2.9 MG/DL (ref 1.9–2.7)
MCH RBC QN AUTO: 24.9 PG (ref 26.8–34.3)
MCH RBC QN AUTO: 25.3 PG (ref 26.8–34.3)
MCH RBC QN AUTO: 25.9 PG (ref 26.8–34.3)
MCHC RBC AUTO-ENTMCNC: 29.8 G/DL (ref 31.4–37.4)
MCHC RBC AUTO-ENTMCNC: 30 G/DL (ref 31.4–37.4)
MCHC RBC AUTO-ENTMCNC: 31.7 G/DL (ref 31.4–37.4)
MCV RBC AUTO: 82 FL (ref 82–98)
MCV RBC AUTO: 84 FL (ref 82–98)
MCV RBC AUTO: 84 FL (ref 82–98)
METAMYELOCYTES NFR BLD MANUAL: 3 % (ref 0–1)
METHADONE UR QL: NEGATIVE
MONOCYTES # BLD AUTO: 0.61 THOUSAND/UL (ref 0–1.22)
MONOCYTES # BLD AUTO: 0.8 THOUSAND/UL (ref 0–1.22)
MONOCYTES NFR BLD: 2 % (ref 4–12)
MONOCYTES NFR BLD: 2 % (ref 4–12)
MRSA NOSE QL CULT: NORMAL
NEUTROPHILS # BLD MANUAL: 27.61 THOUSAND/UL (ref 1.85–7.62)
NEUTROPHILS # BLD MANUAL: 36.9 THOUSAND/UL (ref 1.85–7.62)
NEUTS BAND NFR BLD MANUAL: 14 % (ref 0–8)
NEUTS BAND NFR BLD MANUAL: 7 % (ref 0–8)
NEUTS SEG NFR BLD AUTO: 77 % (ref 43–75)
NEUTS SEG NFR BLD AUTO: 85 % (ref 43–75)
NITRITE UR QL STRIP: NEGATIVE
NON-SQ EPI CELLS URNS QL MICRO: ABNORMAL /HPF
O2 CT BLDA-SCNC: 14.2 ML/DL (ref 16–23)
O2 CT BLDV-SCNC: 6.3 ML/DL
O2 CT BLDV-SCNC: 6.8 ML/DL
O2 CT BLDV-SCNC: 7.5 ML/DL
OPIATES UR QL SCN: NEGATIVE
OXYCODONE+OXYMORPHONE UR QL SCN: POSITIVE
OXYHGB MFR BLDA: 93.5 % (ref 94–97)
P AXIS: 62 DEGREES
P AXIS: 73 DEGREES
PCO2 BLD: 22 MMOL/L (ref 21–32)
PCO2 BLD: 48.2 MM HG (ref 42–50)
PCO2 BLDA: 56.3 MM HG (ref 36–44)
PCO2 BLDV: 48.7 MM HG (ref 42–50)
PCO2 BLDV: 54.7 MM HG (ref 42–50)
PCO2 BLDV: 60.5 MM HG (ref 42–50)
PCP UR QL: NEGATIVE
PEEP RESPIRATORY: 6 CM[H2O]
PH BLD: 7.24 [PH] (ref 7.3–7.4)
PH BLDA: 7.19 [PH] (ref 7.35–7.45)
PH BLDV: 7.23 [PH] (ref 7.3–7.4)
PH BLDV: 7.24 [PH] (ref 7.3–7.4)
PH BLDV: 7.28 [PH] (ref 7.3–7.4)
PH UR STRIP.AUTO: 7 [PH]
PHOSPHATE SERPL-MCNC: 7 MG/DL (ref 2.3–4.1)
PHOSPHATE SERPL-MCNC: 8.4 MG/DL (ref 2.3–4.1)
PHOSPHATE SERPL-MCNC: 9.4 MG/DL (ref 2.3–4.1)
PLATELET # BLD AUTO: 221 THOUSANDS/UL (ref 149–390)
PLATELET # BLD AUTO: 234 THOUSANDS/UL (ref 149–390)
PLATELET # BLD AUTO: 251 THOUSANDS/UL (ref 149–390)
PLATELET BLD QL SMEAR: ADEQUATE
PLATELET BLD QL SMEAR: ADEQUATE
PMV BLD AUTO: 10.3 FL (ref 8.9–12.7)
PMV BLD AUTO: 10.7 FL (ref 8.9–12.7)
PMV BLD AUTO: 9.9 FL (ref 8.9–12.7)
PO2 BLD: 29 MM HG (ref 35–45)
PO2 BLDA: 101.9 MM HG (ref 75–129)
PO2 BLDV: 34.8 MM HG (ref 35–45)
PO2 BLDV: 34.9 MM HG (ref 35–45)
PO2 BLDV: 35.7 MM HG (ref 35–45)
POIKILOCYTOSIS BLD QL SMEAR: PRESENT
POLYCHROMASIA BLD QL SMEAR: PRESENT
POTASSIUM BLD-SCNC: 4.9 MMOL/L (ref 3.5–5.3)
POTASSIUM SERPL-SCNC: 4.7 MMOL/L (ref 3.5–5.3)
POTASSIUM SERPL-SCNC: 5 MMOL/L (ref 3.5–5.3)
POTASSIUM SERPL-SCNC: 5.3 MMOL/L (ref 3.5–5.3)
POTASSIUM SERPL-SCNC: 5.4 MMOL/L (ref 3.5–5.3)
POTASSIUM SERPL-SCNC: 5.7 MMOL/L (ref 3.5–5.3)
PR INTERVAL: 152 MS
PR INTERVAL: 164 MS
PROT SERPL-MCNC: 5.4 G/DL (ref 6.4–8.4)
PROT SERPL-MCNC: 5.6 G/DL (ref 6.4–8.4)
PROT SERPL-MCNC: 6.3 G/DL (ref 6.4–8.4)
PROT UR STRIP-MCNC: ABNORMAL MG/DL
PROTHROMBIN TIME: 26.1 SECONDS (ref 11.6–14.5)
QRS AXIS: 78 DEGREES
QRS AXIS: 85 DEGREES
QRSD INTERVAL: 122 MS
QRSD INTERVAL: 86 MS
QT INTERVAL: 344 MS
QT INTERVAL: 518 MS
QTC INTERVAL: 427 MS
QTC INTERVAL: 472 MS
RBC # BLD AUTO: 3.71 MILLION/UL (ref 3.88–5.62)
RBC # BLD AUTO: 4.07 MILLION/UL (ref 3.88–5.62)
RBC # BLD AUTO: 4.14 MILLION/UL (ref 3.88–5.62)
RBC #/AREA URNS AUTO: ABNORMAL /HPF
RBC MORPH BLD: PRESENT
RH BLD: NEGATIVE
RH BLD: NEGATIVE
SALICYLATES SERPL-MCNC: <5 MG/DL (ref 3–20)
SAO2 % BLD FROM PO2: 43 % (ref 60–85)
SMUDGE CELLS BLD QL SMEAR: PRESENT
SODIUM BLD-SCNC: 131 MMOL/L (ref 136–145)
SODIUM SERPL-SCNC: 134 MMOL/L (ref 135–147)
SODIUM SERPL-SCNC: 134 MMOL/L (ref 135–147)
SODIUM SERPL-SCNC: 138 MMOL/L (ref 135–147)
SODIUM SERPL-SCNC: 139 MMOL/L (ref 135–147)
SODIUM SERPL-SCNC: 139 MMOL/L (ref 135–147)
SP GR UR STRIP.AUTO: 1.02 (ref 1–1.03)
SPECIMEN EXPIRATION DATE: NORMAL
SPECIMEN SOURCE: ABNORMAL
SPECIMEN SOURCE: ABNORMAL
T WAVE AXIS: -12 DEGREES
T WAVE AXIS: 66 DEGREES
THC UR QL: NEGATIVE
TSH SERPL DL<=0.05 MIU/L-ACNC: 1.15 UIU/ML (ref 0.45–4.5)
UROBILINOGEN UR STRIP-ACNC: <2 MG/DL
VARIANT LYMPHS # BLD AUTO: 1 %
VENT AC: 18
VENT AC: 20
VENT AC: 20
VENT- AC: AC
VENTRICULAR RATE: 50 BPM
VENTRICULAR RATE: 93 BPM
VT SETTING VENT: 450 ML
VT SETTING VENT: 480 ML
VT SETTING VENT: 480 ML
WBC # BLD AUTO: 29.3 THOUSAND/UL (ref 4.31–10.16)
WBC # BLD AUTO: 30.34 THOUSAND/UL (ref 4.31–10.16)
WBC # BLD AUTO: 40.11 THOUSAND/UL (ref 4.31–10.16)
WBC #/AREA URNS AUTO: ABNORMAL /HPF

## 2024-01-01 PROCEDURE — 70450 CT HEAD/BRAIN W/O DYE: CPT

## 2024-01-01 PROCEDURE — 94760 N-INVAS EAR/PLS OXIMETRY 1: CPT

## 2024-01-01 PROCEDURE — 71045 X-RAY EXAM CHEST 1 VIEW: CPT

## 2024-01-01 PROCEDURE — 82805 BLOOD GASES W/O2 SATURATION: CPT

## 2024-01-01 PROCEDURE — 80307 DRUG TEST PRSMV CHEM ANLYZR: CPT | Performed by: STUDENT IN AN ORGANIZED HEALTH CARE EDUCATION/TRAINING PROGRAM

## 2024-01-01 PROCEDURE — 82330 ASSAY OF CALCIUM: CPT

## 2024-01-01 PROCEDURE — G1004 CDSM NDSC: HCPCS

## 2024-01-01 PROCEDURE — 85610 PROTHROMBIN TIME: CPT | Performed by: STUDENT IN AN ORGANIZED HEALTH CARE EDUCATION/TRAINING PROGRAM

## 2024-01-01 PROCEDURE — 72141 MRI NECK SPINE W/O DYE: CPT

## 2024-01-01 PROCEDURE — 82077 ASSAY SPEC XCP UR&BREATH IA: CPT | Performed by: STUDENT IN AN ORGANIZED HEALTH CARE EDUCATION/TRAINING PROGRAM

## 2024-01-01 PROCEDURE — 84443 ASSAY THYROID STIM HORMONE: CPT

## 2024-01-01 PROCEDURE — 82550 ASSAY OF CK (CPK): CPT

## 2024-01-01 PROCEDURE — 0BH18EZ INSERTION OF ENDOTRACHEAL AIRWAY INTO TRACHEA, VIA NATURAL OR ARTIFICIAL OPENING ENDOSCOPIC: ICD-10-PCS

## 2024-01-01 PROCEDURE — 94002 VENT MGMT INPAT INIT DAY: CPT

## 2024-01-01 PROCEDURE — 99285 EMERGENCY DEPT VISIT HI MDM: CPT

## 2024-01-01 PROCEDURE — 36600 WITHDRAWAL OF ARTERIAL BLOOD: CPT

## 2024-01-01 PROCEDURE — 99291 CRITICAL CARE FIRST HOUR: CPT | Performed by: STUDENT IN AN ORGANIZED HEALTH CARE EDUCATION/TRAINING PROGRAM

## 2024-01-01 PROCEDURE — 94150 VITAL CAPACITY TEST: CPT

## 2024-01-01 PROCEDURE — 71260 CT THORAX DX C+: CPT

## 2024-01-01 PROCEDURE — 87081 CULTURE SCREEN ONLY: CPT

## 2024-01-01 PROCEDURE — 99223 1ST HOSP IP/OBS HIGH 75: CPT | Performed by: INTERNAL MEDICINE

## 2024-01-01 PROCEDURE — 82550 ASSAY OF CK (CPK): CPT | Performed by: STUDENT IN AN ORGANIZED HEALTH CARE EDUCATION/TRAINING PROGRAM

## 2024-01-01 PROCEDURE — 85014 HEMATOCRIT: CPT

## 2024-01-01 PROCEDURE — 83735 ASSAY OF MAGNESIUM: CPT

## 2024-01-01 PROCEDURE — 82803 BLOOD GASES ANY COMBINATION: CPT

## 2024-01-01 PROCEDURE — NC001 PR NO CHARGE: Performed by: STUDENT IN AN ORGANIZED HEALTH CARE EDUCATION/TRAINING PROGRAM

## 2024-01-01 PROCEDURE — 82947 ASSAY GLUCOSE BLOOD QUANT: CPT

## 2024-01-01 PROCEDURE — 36415 COLL VENOUS BLD VENIPUNCTURE: CPT | Performed by: STUDENT IN AN ORGANIZED HEALTH CARE EDUCATION/TRAINING PROGRAM

## 2024-01-01 PROCEDURE — 84484 ASSAY OF TROPONIN QUANT: CPT

## 2024-01-01 PROCEDURE — 5A1935Z RESPIRATORY VENTILATION, LESS THAN 24 CONSECUTIVE HOURS: ICD-10-PCS

## 2024-01-01 PROCEDURE — 85027 COMPLETE CBC AUTOMATED: CPT

## 2024-01-01 PROCEDURE — 83605 ASSAY OF LACTIC ACID: CPT

## 2024-01-01 PROCEDURE — 87086 URINE CULTURE/COLONY COUNT: CPT

## 2024-01-01 PROCEDURE — 84484 ASSAY OF TROPONIN QUANT: CPT | Performed by: STUDENT IN AN ORGANIZED HEALTH CARE EDUCATION/TRAINING PROGRAM

## 2024-01-01 PROCEDURE — 85007 BL SMEAR W/DIFF WBC COUNT: CPT | Performed by: STUDENT IN AN ORGANIZED HEALTH CARE EDUCATION/TRAINING PROGRAM

## 2024-01-01 PROCEDURE — 80048 BASIC METABOLIC PNL TOTAL CA: CPT

## 2024-01-01 PROCEDURE — 90715 TDAP VACCINE 7 YRS/> IM: CPT | Performed by: PHYSICIAN ASSISTANT

## 2024-01-01 PROCEDURE — 86901 BLOOD TYPING SEROLOGIC RH(D): CPT | Performed by: STUDENT IN AN ORGANIZED HEALTH CARE EDUCATION/TRAINING PROGRAM

## 2024-01-01 PROCEDURE — NC001 PR NO CHARGE

## 2024-01-01 PROCEDURE — 85007 BL SMEAR W/DIFF WBC COUNT: CPT

## 2024-01-01 PROCEDURE — 80053 COMPREHEN METABOLIC PANEL: CPT

## 2024-01-01 PROCEDURE — 85730 THROMBOPLASTIN TIME PARTIAL: CPT | Performed by: STUDENT IN AN ORGANIZED HEALTH CARE EDUCATION/TRAINING PROGRAM

## 2024-01-01 PROCEDURE — 82948 REAGENT STRIP/BLOOD GLUCOSE: CPT

## 2024-01-01 PROCEDURE — 74177 CT ABD & PELVIS W/CONTRAST: CPT

## 2024-01-01 PROCEDURE — 84100 ASSAY OF PHOSPHORUS: CPT | Performed by: STUDENT IN AN ORGANIZED HEALTH CARE EDUCATION/TRAINING PROGRAM

## 2024-01-01 PROCEDURE — 86900 BLOOD TYPING SEROLOGIC ABO: CPT | Performed by: STUDENT IN AN ORGANIZED HEALTH CARE EDUCATION/TRAINING PROGRAM

## 2024-01-01 PROCEDURE — 83735 ASSAY OF MAGNESIUM: CPT | Performed by: STUDENT IN AN ORGANIZED HEALTH CARE EDUCATION/TRAINING PROGRAM

## 2024-01-01 PROCEDURE — 84100 ASSAY OF PHOSPHORUS: CPT

## 2024-01-01 PROCEDURE — 93005 ELECTROCARDIOGRAM TRACING: CPT

## 2024-01-01 PROCEDURE — 99291 CRITICAL CARE FIRST HOUR: CPT | Performed by: INTERNAL MEDICINE

## 2024-01-01 PROCEDURE — 80143 DRUG ASSAY ACETAMINOPHEN: CPT | Performed by: STUDENT IN AN ORGANIZED HEALTH CARE EDUCATION/TRAINING PROGRAM

## 2024-01-01 PROCEDURE — NC001 PR NO CHARGE: Performed by: INTERNAL MEDICINE

## 2024-01-01 PROCEDURE — 80048 BASIC METABOLIC PNL TOTAL CA: CPT | Performed by: INTERNAL MEDICINE

## 2024-01-01 PROCEDURE — 84295 ASSAY OF SERUM SODIUM: CPT

## 2024-01-01 PROCEDURE — 86850 RBC ANTIBODY SCREEN: CPT | Performed by: STUDENT IN AN ORGANIZED HEALTH CARE EDUCATION/TRAINING PROGRAM

## 2024-01-01 PROCEDURE — 84132 ASSAY OF SERUM POTASSIUM: CPT

## 2024-01-01 PROCEDURE — 83605 ASSAY OF LACTIC ACID: CPT | Performed by: STUDENT IN AN ORGANIZED HEALTH CARE EDUCATION/TRAINING PROGRAM

## 2024-01-01 PROCEDURE — 94003 VENT MGMT INPAT SUBQ DAY: CPT

## 2024-01-01 PROCEDURE — 80053 COMPREHEN METABOLIC PANEL: CPT | Performed by: STUDENT IN AN ORGANIZED HEALTH CARE EDUCATION/TRAINING PROGRAM

## 2024-01-01 PROCEDURE — 80179 DRUG ASSAY SALICYLATE: CPT | Performed by: STUDENT IN AN ORGANIZED HEALTH CARE EDUCATION/TRAINING PROGRAM

## 2024-01-01 PROCEDURE — NC001 PR NO CHARGE: Performed by: PHYSICIAN ASSISTANT

## 2024-01-01 PROCEDURE — 85027 COMPLETE CBC AUTOMATED: CPT | Performed by: STUDENT IN AN ORGANIZED HEALTH CARE EDUCATION/TRAINING PROGRAM

## 2024-01-01 PROCEDURE — 82805 BLOOD GASES W/O2 SATURATION: CPT | Performed by: INTERNAL MEDICINE

## 2024-01-01 PROCEDURE — 99223 1ST HOSP IP/OBS HIGH 75: CPT | Performed by: PHYSICIAN ASSISTANT

## 2024-01-01 PROCEDURE — 87040 BLOOD CULTURE FOR BACTERIA: CPT | Performed by: STUDENT IN AN ORGANIZED HEALTH CARE EDUCATION/TRAINING PROGRAM

## 2024-01-01 PROCEDURE — 81001 URINALYSIS AUTO W/SCOPE: CPT

## 2024-01-01 PROCEDURE — 72125 CT NECK SPINE W/O DYE: CPT

## 2024-01-01 PROCEDURE — 5A12012 PERFORMANCE OF CARDIAC OUTPUT, SINGLE, MANUAL: ICD-10-PCS

## 2024-01-01 PROCEDURE — 31500 INSERT EMERGENCY AIRWAY: CPT

## 2024-01-01 PROCEDURE — EDAIR PR ED AIR: Performed by: STUDENT IN AN ORGANIZED HEALTH CARE EDUCATION/TRAINING PROGRAM

## 2024-01-01 RX ORDER — SODIUM CHLORIDE, SODIUM GLUCONATE, SODIUM ACETATE, POTASSIUM CHLORIDE, MAGNESIUM CHLORIDE, SODIUM PHOSPHATE, DIBASIC, AND POTASSIUM PHOSPHATE .53; .5; .37; .037; .03; .012; .00082 G/100ML; G/100ML; G/100ML; G/100ML; G/100ML; G/100ML; G/100ML
500 INJECTION, SOLUTION INTRAVENOUS ONCE
Status: COMPLETED | OUTPATIENT
Start: 2024-01-01 | End: 2024-01-01

## 2024-01-01 RX ORDER — HYDROMORPHONE HCL/PF 1 MG/ML
1 SYRINGE (ML) INJECTION
Status: DISCONTINUED | OUTPATIENT
Start: 2024-01-01 | End: 2024-01-05 | Stop reason: HOSPADM

## 2024-01-01 RX ORDER — OXYCODONE HYDROCHLORIDE 10 MG/1
10 TABLET ORAL EVERY 4 HOURS PRN
Status: DISCONTINUED | OUTPATIENT
Start: 2024-01-01 | End: 2024-01-01

## 2024-01-01 RX ORDER — CHLORHEXIDINE GLUCONATE ORAL RINSE 1.2 MG/ML
15 SOLUTION DENTAL EVERY 12 HOURS SCHEDULED
Status: DISCONTINUED | OUTPATIENT
Start: 2024-01-01 | End: 2024-01-01

## 2024-01-01 RX ORDER — GLYCOPYRROLATE 0.2 MG/ML
0.1 INJECTION INTRAMUSCULAR; INTRAVENOUS EVERY 4 HOURS PRN
Status: DISCONTINUED | OUTPATIENT
Start: 2024-01-01 | End: 2024-01-05 | Stop reason: HOSPADM

## 2024-01-01 RX ORDER — HYDROMORPHONE HCL/PF 1 MG/ML
0.5 SYRINGE (ML) INJECTION EVERY 4 HOURS PRN
Status: DISCONTINUED | OUTPATIENT
Start: 2024-01-01 | End: 2024-01-01

## 2024-01-01 RX ORDER — SODIUM CHLORIDE, SODIUM GLUCONATE, SODIUM ACETATE, POTASSIUM CHLORIDE, MAGNESIUM CHLORIDE, SODIUM PHOSPHATE, DIBASIC, AND POTASSIUM PHOSPHATE .53; .5; .37; .037; .03; .012; .00082 G/100ML; G/100ML; G/100ML; G/100ML; G/100ML; G/100ML; G/100ML
500 INJECTION, SOLUTION INTRAVENOUS ONCE
Status: DISCONTINUED | OUTPATIENT
Start: 2024-01-01 | End: 2024-01-01

## 2024-01-01 RX ORDER — SODIUM CHLORIDE, SODIUM GLUCONATE, SODIUM ACETATE, POTASSIUM CHLORIDE, MAGNESIUM CHLORIDE, SODIUM PHOSPHATE, DIBASIC, AND POTASSIUM PHOSPHATE .53; .5; .37; .037; .03; .012; .00082 G/100ML; G/100ML; G/100ML; G/100ML; G/100ML; G/100ML; G/100ML
1000 INJECTION, SOLUTION INTRAVENOUS ONCE
Status: COMPLETED | OUTPATIENT
Start: 2024-01-01 | End: 2024-01-01

## 2024-01-01 RX ORDER — FENTANYL CITRATE 50 UG/ML
50 INJECTION, SOLUTION INTRAMUSCULAR; INTRAVENOUS
Status: DISCONTINUED | OUTPATIENT
Start: 2024-01-01 | End: 2024-01-01

## 2024-01-01 RX ORDER — VANCOMYCIN HYDROCHLORIDE 1 G/200ML
12.5 INJECTION, SOLUTION INTRAVENOUS DAILY PRN
Status: DISCONTINUED | OUTPATIENT
Start: 2024-01-01 | End: 2024-01-01

## 2024-01-01 RX ORDER — HEPARIN SODIUM 5000 [USP'U]/ML
5000 INJECTION, SOLUTION INTRAVENOUS; SUBCUTANEOUS EVERY 8 HOURS SCHEDULED
Status: DISCONTINUED | OUTPATIENT
Start: 2024-01-01 | End: 2024-01-01

## 2024-01-01 RX ORDER — EPINEPHRINE 0.1 MG/ML
INJECTION INTRAVENOUS CODE/TRAUMA/SEDATION MEDICATION
Status: COMPLETED | OUTPATIENT
Start: 2024-01-01 | End: 2024-01-01

## 2024-01-01 RX ORDER — SODIUM CHLORIDE, SODIUM GLUCONATE, SODIUM ACETATE, POTASSIUM CHLORIDE, MAGNESIUM CHLORIDE, SODIUM PHOSPHATE, DIBASIC, AND POTASSIUM PHOSPHATE .53; .5; .37; .037; .03; .012; .00082 G/100ML; G/100ML; G/100ML; G/100ML; G/100ML; G/100ML; G/100ML
125 INJECTION, SOLUTION INTRAVENOUS CONTINUOUS
Status: DISCONTINUED | OUTPATIENT
Start: 2024-01-01 | End: 2024-01-01

## 2024-01-01 RX ORDER — HEPARIN SODIUM 5000 [USP'U]/ML
5000 INJECTION, SOLUTION INTRAVENOUS; SUBCUTANEOUS ONCE
Status: CANCELLED | OUTPATIENT
Start: 2024-01-01 | End: 2024-01-01

## 2024-01-01 RX ORDER — HYDROMORPHONE HCL/PF 1 MG/ML
0.3 SYRINGE (ML) INJECTION EVERY 2 HOUR PRN
Status: DISCONTINUED | OUTPATIENT
Start: 2024-01-01 | End: 2024-01-01

## 2024-01-01 RX ORDER — ALBUTEROL SULFATE 90 UG/1
1 AEROSOL, METERED RESPIRATORY (INHALATION) EVERY 6 HOURS PRN
Status: DISCONTINUED | OUTPATIENT
Start: 2024-01-01 | End: 2024-01-05 | Stop reason: HOSPADM

## 2024-01-01 RX ORDER — OXYCODONE HYDROCHLORIDE 5 MG/1
5 TABLET ORAL EVERY 4 HOURS PRN
Status: DISCONTINUED | OUTPATIENT
Start: 2024-01-01 | End: 2024-01-01

## 2024-01-01 RX ORDER — CALCIUM CHLORIDE 100 MG/ML
SYRINGE (ML) INTRAVENOUS CODE/TRAUMA/SEDATION MEDICATION
Status: COMPLETED | OUTPATIENT
Start: 2024-01-01 | End: 2024-01-01

## 2024-01-01 RX ORDER — HYDROMORPHONE HCL/PF 1 MG/ML
0.5 SYRINGE (ML) INJECTION EVERY 2 HOUR PRN
Status: DISCONTINUED | OUTPATIENT
Start: 2024-01-01 | End: 2024-01-01

## 2024-01-01 RX ORDER — PROPOFOL 10 MG/ML
5-50 INJECTION, EMULSION INTRAVENOUS
Status: DISCONTINUED | OUTPATIENT
Start: 2024-01-01 | End: 2024-01-01

## 2024-01-01 RX ORDER — HALOPERIDOL 5 MG/ML
0.5 INJECTION INTRAMUSCULAR EVERY 2 HOUR PRN
Status: DISCONTINUED | OUTPATIENT
Start: 2024-01-01 | End: 2024-01-05 | Stop reason: HOSPADM

## 2024-01-01 RX ORDER — FENTANYL CITRATE-0.9 % NACL/PF 10 MCG/ML
100 PLASTIC BAG, INJECTION (ML) INTRAVENOUS CONTINUOUS
Status: DISCONTINUED | OUTPATIENT
Start: 2024-01-01 | End: 2024-01-05 | Stop reason: HOSPADM

## 2024-01-01 RX ORDER — DEXTROSE MONOHYDRATE 25 G/50ML
25 INJECTION, SOLUTION INTRAVENOUS ONCE
Status: COMPLETED | OUTPATIENT
Start: 2024-01-01 | End: 2024-01-01

## 2024-01-01 RX ORDER — SODIUM BICARBONATE 84 MG/ML
INJECTION, SOLUTION INTRAVENOUS CODE/TRAUMA/SEDATION MEDICATION
Status: COMPLETED | OUTPATIENT
Start: 2024-01-01 | End: 2024-01-01

## 2024-01-01 RX ORDER — LORAZEPAM 2 MG/ML
1 INJECTION INTRAMUSCULAR
Status: DISCONTINUED | OUTPATIENT
Start: 2024-01-01 | End: 2024-01-05 | Stop reason: HOSPADM

## 2024-01-01 RX ORDER — METRONIDAZOLE 500 MG/100ML
500 INJECTION, SOLUTION INTRAVENOUS EVERY 8 HOURS
Status: DISCONTINUED | OUTPATIENT
Start: 2024-01-01 | End: 2024-01-01

## 2024-01-01 RX ADMIN — SODIUM CHLORIDE, SODIUM GLUCONATE, SODIUM ACETATE, POTASSIUM CHLORIDE, MAGNESIUM CHLORIDE, SODIUM PHOSPHATE, DIBASIC, AND POTASSIUM PHOSPHATE 1000 ML: .53; .5; .37; .037; .03; .012; .00082 INJECTION, SOLUTION INTRAVENOUS at 16:58

## 2024-01-01 RX ADMIN — HYDROMORPHONE HYDROCHLORIDE 1 MG: 1 INJECTION, SOLUTION INTRAMUSCULAR; INTRAVENOUS; SUBCUTANEOUS at 20:58

## 2024-01-01 RX ADMIN — PIPERACILLIN SODIUM AND TAZOBACTAM SODIUM 3.38 G: 36; 4.5 INJECTION, POWDER, LYOPHILIZED, FOR SOLUTION INTRAVENOUS at 11:01

## 2024-01-01 RX ADMIN — HEPARIN SODIUM 5000 UNITS: 5000 INJECTION INTRAVENOUS; SUBCUTANEOUS at 18:01

## 2024-01-01 RX ADMIN — HEPARIN SODIUM 5000 UNITS: 5000 INJECTION INTRAVENOUS; SUBCUTANEOUS at 11:01

## 2024-01-01 RX ADMIN — SODIUM CHLORIDE, SODIUM GLUCONATE, SODIUM ACETATE, POTASSIUM CHLORIDE, MAGNESIUM CHLORIDE, SODIUM PHOSPHATE, DIBASIC, AND POTASSIUM PHOSPHATE 500 ML: .53; .5; .37; .037; .03; .012; .00082 INJECTION, SOLUTION INTRAVENOUS at 01:25

## 2024-01-01 RX ADMIN — SODIUM BICARBONATE 50 MEQ: 84 INJECTION INTRAVENOUS at 22:33

## 2024-01-01 RX ADMIN — PIPERACILLIN SODIUM AND TAZOBACTAM SODIUM 3.38 G: 36; 4.5 INJECTION, POWDER, LYOPHILIZED, FOR SOLUTION INTRAVENOUS at 03:34

## 2024-01-01 RX ADMIN — SODIUM CHLORIDE, SODIUM GLUCONATE, SODIUM ACETATE, POTASSIUM CHLORIDE, MAGNESIUM CHLORIDE, SODIUM PHOSPHATE, DIBASIC, AND POTASSIUM PHOSPHATE 250 ML/HR: .53; .5; .37; .037; .03; .012; .00082 INJECTION, SOLUTION INTRAVENOUS at 18:18

## 2024-01-01 RX ADMIN — SODIUM CHLORIDE, SODIUM GLUCONATE, SODIUM ACETATE, POTASSIUM CHLORIDE, MAGNESIUM CHLORIDE, SODIUM PHOSPHATE, DIBASIC, AND POTASSIUM PHOSPHATE 125 ML/HR: .53; .5; .37; .037; .03; .012; .00082 INJECTION, SOLUTION INTRAVENOUS at 17:47

## 2024-01-01 RX ADMIN — SODIUM CHLORIDE, SODIUM GLUCONATE, SODIUM ACETATE, POTASSIUM CHLORIDE, MAGNESIUM CHLORIDE, SODIUM PHOSPHATE, DIBASIC, AND POTASSIUM PHOSPHATE 1000 ML: .53; .5; .37; .037; .03; .012; .00082 INJECTION, SOLUTION INTRAVENOUS at 21:49

## 2024-01-01 RX ADMIN — SODIUM CHLORIDE, SODIUM GLUCONATE, SODIUM ACETATE, POTASSIUM CHLORIDE, MAGNESIUM CHLORIDE, SODIUM PHOSPHATE, DIBASIC, AND POTASSIUM PHOSPHATE 500 ML: .53; .5; .37; .037; .03; .012; .00082 INJECTION, SOLUTION INTRAVENOUS at 17:15

## 2024-01-01 RX ADMIN — SODIUM BICARBONATE 50 MEQ: 84 INJECTION INTRAVENOUS at 08:39

## 2024-01-01 RX ADMIN — IOHEXOL 100 ML: 350 INJECTION, SOLUTION INTRAVENOUS at 11:24

## 2024-01-01 RX ADMIN — SODIUM CHLORIDE, SODIUM GLUCONATE, SODIUM ACETATE, POTASSIUM CHLORIDE, MAGNESIUM CHLORIDE, SODIUM PHOSPHATE, DIBASIC, AND POTASSIUM PHOSPHATE 500 ML: .53; .5; .37; .037; .03; .012; .00082 INJECTION, SOLUTION INTRAVENOUS at 03:25

## 2024-01-01 RX ADMIN — CEFEPIME 1000 MG: 1 INJECTION, POWDER, FOR SOLUTION INTRAMUSCULAR; INTRAVENOUS at 13:12

## 2024-01-01 RX ADMIN — METRONIDAZOLE 500 MG: 500 INJECTION, SOLUTION INTRAVENOUS at 13:20

## 2024-01-01 RX ADMIN — Medication 50 MCG/HR: at 15:22

## 2024-01-01 RX ADMIN — TETANUS TOXOID, REDUCED DIPHTHERIA TOXOID AND ACELLULAR PERTUSSIS VACCINE, ADSORBED 0.5 ML: 5; 2.5; 8; 8; 2.5 SUSPENSION INTRAMUSCULAR at 11:01

## 2024-01-01 RX ADMIN — Medication 5 MCG/MIN: at 15:22

## 2024-01-01 RX ADMIN — PROPOFOL 25 MCG/KG/MIN: 10 INJECTION, EMULSION INTRAVENOUS at 03:18

## 2024-01-01 RX ADMIN — CALCIUM CHLORIDE 1 G: 100 INJECTION PARENTERAL at 22:33

## 2024-01-01 RX ADMIN — Medication 6 MCG/MIN: at 00:06

## 2024-01-01 RX ADMIN — HYDROMORPHONE HYDROCHLORIDE 1 MG: 1 INJECTION, SOLUTION INTRAMUSCULAR; INTRAVENOUS; SUBCUTANEOUS at 17:44

## 2024-01-01 RX ADMIN — SODIUM CHLORIDE, SODIUM GLUCONATE, SODIUM ACETATE, POTASSIUM CHLORIDE, MAGNESIUM CHLORIDE, SODIUM PHOSPHATE, DIBASIC, AND POTASSIUM PHOSPHATE 1000 ML: .53; .5; .37; .037; .03; .012; .00082 INJECTION, SOLUTION INTRAVENOUS at 14:18

## 2024-01-01 RX ADMIN — SODIUM CHLORIDE, SODIUM GLUCONATE, SODIUM ACETATE, POTASSIUM CHLORIDE, MAGNESIUM CHLORIDE, SODIUM PHOSPHATE, DIBASIC, AND POTASSIUM PHOSPHATE 125 ML/HR: .53; .5; .37; .037; .03; .012; .00082 INJECTION, SOLUTION INTRAVENOUS at 15:31

## 2024-01-01 RX ADMIN — SODIUM CHLORIDE, SODIUM GLUCONATE, SODIUM ACETATE, POTASSIUM CHLORIDE, MAGNESIUM CHLORIDE, SODIUM PHOSPHATE, DIBASIC, AND POTASSIUM PHOSPHATE 250 ML/HR: .53; .5; .37; .037; .03; .012; .00082 INJECTION, SOLUTION INTRAVENOUS at 21:57

## 2024-01-01 RX ADMIN — EPINEPHRINE 1 MG: 0.1 INJECTION INTRAVENOUS at 22:33

## 2024-01-01 RX ADMIN — Medication 50 MCG/HR: at 02:39

## 2024-01-01 RX ADMIN — NOREPINEPHRINE BITARTRATE 6 MCG/MIN: 1 SOLUTION INTRAVENOUS at 00:06

## 2024-01-01 RX ADMIN — CHLORHEXIDINE GLUCONATE 15 ML: 1.2 SOLUTION ORAL at 08:38

## 2024-01-01 RX ADMIN — LORAZEPAM 1 MG: 2 INJECTION INTRAMUSCULAR; INTRAVENOUS at 17:25

## 2024-01-01 RX ADMIN — VANCOMYCIN HYDROCHLORIDE 2000 MG: 5 INJECTION, POWDER, LYOPHILIZED, FOR SOLUTION INTRAVENOUS at 15:11

## 2024-01-01 RX ADMIN — PIPERACILLIN SODIUM AND TAZOBACTAM SODIUM 4.5 G: 36; 4.5 INJECTION, POWDER, LYOPHILIZED, FOR SOLUTION INTRAVENOUS at 18:33

## 2024-01-01 RX ADMIN — FENTANYL CITRATE 50 MCG: 50 INJECTION INTRAMUSCULAR; INTRAVENOUS at 23:50

## 2024-01-01 RX ADMIN — HYDROMORPHONE HYDROCHLORIDE 0.5 MG: 1 INJECTION, SOLUTION INTRAMUSCULAR; INTRAVENOUS; SUBCUTANEOUS at 17:25

## 2024-01-01 RX ADMIN — Medication 6 MCG/MIN: at 00:08

## 2024-01-01 RX ADMIN — PROPOFOL 20 MCG/KG/MIN: 10 INJECTION, EMULSION INTRAVENOUS at 12:37

## 2024-01-01 RX ADMIN — HEPARIN SODIUM 5000 UNITS: 5000 INJECTION INTRAVENOUS; SUBCUTANEOUS at 02:50

## 2024-01-01 RX ADMIN — SODIUM CHLORIDE, SODIUM GLUCONATE, SODIUM ACETATE, POTASSIUM CHLORIDE, MAGNESIUM CHLORIDE, SODIUM PHOSPHATE, DIBASIC, AND POTASSIUM PHOSPHATE 125 ML/HR: .53; .5; .37; .037; .03; .012; .00082 INJECTION, SOLUTION INTRAVENOUS at 07:05

## 2024-01-01 RX ADMIN — CHLORHEXIDINE GLUCONATE 15 ML: 1.2 SOLUTION ORAL at 21:17

## 2024-01-01 RX ADMIN — DEXTROSE MONOHYDRATE 50 ML: 25 INJECTION, SOLUTION INTRAVENOUS at 08:39

## 2024-01-01 RX ADMIN — SODIUM CHLORIDE, SODIUM GLUCONATE, SODIUM ACETATE, POTASSIUM CHLORIDE, MAGNESIUM CHLORIDE, SODIUM PHOSPHATE, DIBASIC, AND POTASSIUM PHOSPHATE 250 ML/HR: .53; .5; .37; .037; .03; .012; .00082 INJECTION, SOLUTION INTRAVENOUS at 01:33

## 2024-01-01 RX ADMIN — INSULIN HUMAN 10 UNITS: 100 INJECTION, SOLUTION PARENTERAL at 08:50

## 2024-01-01 RX ADMIN — PROPOFOL 20 MCG/KG/MIN: 10 INJECTION, EMULSION INTRAVENOUS at 23:09

## 2024-01-01 RX ADMIN — HYDROMORPHONE HYDROCHLORIDE 0.5 MG: 1 INJECTION, SOLUTION INTRAMUSCULAR; INTRAVENOUS; SUBCUTANEOUS at 20:02

## 2024-01-01 RX ADMIN — GLYCOPYRROLATE 0.1 MG: 0.2 INJECTION INTRAMUSCULAR; INTRAVENOUS at 17:44

## 2024-01-03 PROBLEM — J98.4 CAVITATING MASS IN RIGHT UPPER LUNG LOBE: Status: ACTIVE | Noted: 2024-01-01

## 2024-01-03 PROBLEM — A41.9 SEVERE SEPSIS (HCC): Status: ACTIVE | Noted: 2024-01-01

## 2024-01-03 PROBLEM — I73.9 PERIPHERAL ARTERY DISEASE (HCC): Status: ACTIVE | Noted: 2024-01-01

## 2024-01-03 PROBLEM — N17.9 AKI (ACUTE KIDNEY INJURY) (HCC): Status: ACTIVE | Noted: 2024-01-01

## 2024-01-03 PROBLEM — M62.82 RHABDOMYOLYSIS: Status: ACTIVE | Noted: 2024-01-01

## 2024-01-03 PROBLEM — R20.2 PARESTHESIA OF UPPER EXTREMITY: Status: ACTIVE | Noted: 2024-01-01

## 2024-01-03 PROBLEM — R29.6 UNWITNESSED FALL: Status: ACTIVE | Noted: 2024-01-01

## 2024-01-03 PROBLEM — R79.89 ELEVATED TROPONIN: Status: ACTIVE | Noted: 2024-01-01

## 2024-01-03 PROBLEM — E83.52 HYPERCALCEMIA: Status: ACTIVE | Noted: 2024-01-01

## 2024-01-03 PROBLEM — R65.20 SEVERE SEPSIS (HCC): Status: ACTIVE | Noted: 2024-01-01

## 2024-01-03 PROBLEM — T68.XXXA HYPOTHERMIA: Status: ACTIVE | Noted: 2024-01-01

## 2024-01-03 PROBLEM — E87.29 INCREASED ANION GAP METABOLIC ACIDOSIS: Status: ACTIVE | Noted: 2024-01-01

## 2024-01-03 PROBLEM — R93.89 ABNORMAL FINDING ON CT SCAN: Status: ACTIVE | Noted: 2024-01-01

## 2024-01-03 PROBLEM — J44.9 COPD (CHRONIC OBSTRUCTIVE PULMONARY DISEASE) (HCC): Status: ACTIVE | Noted: 2024-01-01

## 2024-01-03 PROBLEM — I71.40 ABDOMINAL AORTIC ANEURYSM (AAA) (HCC): Status: ACTIVE | Noted: 2024-01-01

## 2024-01-03 NOTE — PROGRESS NOTES
Cervical Collar Clearance:    The patient had a CT scan of the cervical spine demonstrating no acute injury. On exam, the patient had no midline point tenderness or paresthesias/numbness/weakness in the extremities. The patient had full range of motion (was then able to flex, extend, and rotate head laterally) without pain. There were no distracting injuries and the patient was not intoxicated.      The patient's cervical spine was cleared radiologically and clinically. Cervical collar removed at this time.     Lauryn Ullrich, DO  1/3/2024 12:17 PM

## 2024-01-03 NOTE — ED NOTES
Temp probe removed from [place] for MRI. Temperature has stabilized.     Armida Talamantes RN  01/03/24 1521

## 2024-01-03 NOTE — ASSESSMENT & PLAN NOTE
On admission lactic acid 9.  Anion gap 19. Suspected in the setting of hypothermia versus severe sepsis.  Patient currently undergoing active rewarming with bear hugger and warmed IV fluids.  Patient also under sepsis protocol, see severe sepsis plan.  Currently lactic acid downtrending to 4.2.  Continue with hydration and lactic acid trending

## 2024-01-03 NOTE — PROGRESS NOTES
The patient’s standard-infusion Piperacillin-Tazobactam / Zosyn (infused over 30-60 minutes) has been converted to extended-infusion (infused over 4 hours) per [facility]’s Extended-Infusion Piperacillin-Tazobactam Protocol for Adults as approved by the Pharmacy and Therapeutics Committee (accessible here on MyNET).     The patient met ALL eligible criteria:    Age >= 18 years old   Critical Care patient    And did NOT have ANY exclusions:     Emergency Department or Operating Room patient  Drug incompatibilities that could NOT be avoided with timing or separate line administration    The following are reminders for Nursing regarding administration:  Infuse the first dose of Zosyn over 30min as a load (if new start), and then all subsequent doses will be given as an extended-infusion over 4 hours (see dosing below)  Use primary tubing as an intermittent infusion; change out primary tubing every 24 hours   Ensure full dose of the medication is given at the appropriate rate  Most incompatible drugs can be scheduled during times when the Zosyn is not being infused; however, if one requires administration during the same time, a separate site or lumen MUST be used  If access is limited and an incompatible medication urgently needs to be given, the Zosyn extended-infusion can be held for up to 30min (remember to flush line before/after)  Extended-infusion Zosyn does NOT require special timing around hemodialysis (it can even be given simultaneously)  If a patient needs an urgent MRI while Zosyn is infusing and there is not a MRI-compatible pump available for use, finish the infusion over the traditional length (30min) and ask Pharmacy to reschedule the next doses so that they start a few hours earlier  Pharmacy will assist nursing in troubleshooting other administration issues as they arise  Dosing for Piperacillin-Tazobactam  CrCl (mL/min) Traditional Dosing Extended-Infusion Dosing #   CrCl > 40 High-Dose  CrCl > 40  Low-Dose 4.5g Q6H (over 30min)  3.375g IV Q6H (over 30min) 3.375g IV Q8H (over 4hr)*    *1st dose loaded over 30min, then start extended-infusion dosing 4hr later   CrCl 20-40 High-Dose  CrCl 20-40 Low-Dose 3.375g IV Q6H (over 30min)  2.25g IV Q6H (over 30min)    CrCl < 20 High-Dose  CrCl < 20 Low-Dose 2.25g IV Q6H (over 30min)  2.25g IV Q8H (over 30min) 3.375g IV Q12H (over 4hr)*    *1st dose loaded over 30min, then start extended-infusion dosing 6hr later   Hemo/Peritoneal Dialysis High-Dose  Hemo/Peritoneal Dialysis Low-Dose 2.25g IV Q6H (over 30min)  2.25g IV Q8H (over 30min)    CVVH/D High-Dose  CVVH/D Low-Dose 3.375g IV Q6H (over 30min)  2.25 IV Q6H (over 30min) 3.375g IV Q8H (over 4hr)*    *1st dose loaded over 30min, then start extended-infusion dosing 4hr later   # = Use 4.5g dosing (same interval) if morbidly obese (BMI ?40)    Please call the Pharmacy with any questions or concerns.

## 2024-01-03 NOTE — ASSESSMENT & PLAN NOTE
Cavitary mass in  right upper noted on CT chest.  Recently 12/20/23  underwent bronchoscopy with biopsy and was diagnosed with squamous cell carcinoma.  Patient will require hematology oncology follow-up.

## 2024-01-03 NOTE — ASSESSMENT & PLAN NOTE
Patient presents after being found outside on the ground (unwitnessed fall).  Patient with dementia unable to provide relevant story.  Temperature 30.2 C and bradycardia in 40s. BP slight high on admission and maintained stable.  Plan  Will require active rewarming with bear hugger and warm IV fluids.  Continue monitoring temperature.  See plan for severe sepsis.  Pending TSH to rule out hypothyroidism

## 2024-01-03 NOTE — QUICK NOTE
Advanced Care Planning Note:  Called back to patient room in the setting of new onset numbness and tingling in the hands with cervical spine tenderness.  CT cervical spine reviewed; no acute traumatic injuries; however will place patient back in collar and ascertain MRI of the C-spine.  Will order stat MRI.  Spoke with radiologist.  Nursing updated.  Family updated at bedside.  Will continue close observation.  ICU team made aware.

## 2024-01-03 NOTE — ASSESSMENT & PLAN NOTE
History of peripheral artery disease status post femoral popliteal bypass.  Taking clopidogrel 75 mg, atorvastatin 80 mg and Zeita 10 mg daily.  Will continue home medication

## 2024-01-03 NOTE — ASSESSMENT & PLAN NOTE
POA troponin at 0h 862, T 2h 699.  EKG with sinus bradycardia.  No significant ST abnormalities.  Likely type 2 non MI trop elevation in the setting of hypothermia and sepsis.

## 2024-01-03 NOTE — ASSESSMENT & PLAN NOTE
According to current CT abdomen and pelvis: infrarenal abdominal aortic aneurysm, slightly increased in size since the prior study. Bilateral common iliac artery aneurysms.   Patient follows with vascular team in the outpatient setting..  Currently under surveillance every 6 months.

## 2024-01-03 NOTE — ED PROVIDER NOTES
Emergency Department Airway Evaluation and Management Form    History  Obtained from: Patient, EMS  Patient has no allergy information on record.  No chief complaint on file.    HPI  63-year-old male presents after being found down outside, unknown injury, found to be hypothermic for EMS    No past medical history on file.  No past surgical history on file.  No family history on file.     I have reviewed and agree with the history as documented.    Review of Systems  Limited due to trauma evaluation    Physical Exam  BP (!) 182/100   Pulse (!) 45   Temp (!) 86.3 °F (30.2 °C) (Rectal)   Resp 14   Wt 84.8 kg (186 lb 15.2 oz)     Physical Exam  Primary survey  Airway is naturally patent and intact  Equal breath sounds bilaterally  Strong pulses in all extremities  GCS 14, 1 off for confusion  Exposure being obtained    ED Medications  Medications - No data to display    Intubation  Procedures    Notes  Patient seen as a trauma level B activation.  Airway support provided to trauma team.  No acute airway interventions indicated.  Defer all other care/management/final diagnosis and disposition to trauma surgery service      Final Diagnosis  Final diagnoses:   None       ED Provider  Electronically Signed by     José Adame DO  01/03/24 [id number]

## 2024-01-03 NOTE — ASSESSMENT & PLAN NOTE
Moderate COPD with severe emphysema .  Follows up with pulmonologist at [facility].  Spirometry/PFTs ordered for future  Patient on room air at baseline.  History of tobacco abuse 1 pack a day for the past 42 years. Quit at least a year ago  Currently taking varenicline for smoking cessation.

## 2024-01-03 NOTE — ED NOTES
Pt c/o burning and pain to b/l hands; discoloration and abnormal range of motion noted. Provider aware.      Salome Mercado RN  01/03/24 [id number]

## 2024-01-03 NOTE — ASSESSMENT & PLAN NOTE
Presents with CK in 6000s.  Patient was found on the floor unknown for how long he has been laying.  Labs with ELIE on admission.  Will require aggressive hydration.  Ordered 30 ml/kg for sepsis protocol of warmed IV Isolyte  Will continue with maintenance at 125 mill/hour.  Recheck CK

## 2024-01-03 NOTE — QUICK NOTE
"Cervical Collar Clearance:    The patient had a MRI scan of the cervical spine demonstrating no acute injury.    MRI results:  \"Diffuse osseous metastatic disease throughout the cervical and upper thoracic spine with some areas of extraosseous extension of disease most noteworthy at the left aspect of C7 and the right aspect of T1. The lesion at T1 appears to extend into the   neural foramen resulting in significant foraminal narrowing on the right. Consider follow-up postcontrast imaging in particular if surgery or radiation therapy is being considered.     Multilevel cervical spondylitic degenerative changes superimposed upon the findings above with significant foraminal narrowing present at the C6-7 level, left greater than right.     No cord compression or abnormal signal to suggest central cord lesion.\"    On exam, the patient had no midline point tenderness. No new paresthesias or weakness different from the patients baseline. Sensation intact.     The patient had full range of motion (was then able to flex, extend, and rotate head laterally) without pain. There were no distracting injuries and the patient was not intoxicated.      The patient's cervical spine was cleared radiologically and clinically. Cervical collar removed at this time.     AYAH Costa  1/3/2024 5:21 PM     "

## 2024-01-03 NOTE — SEPSIS NOTE
"  Sepsis Note   Cezar Enciso 63 y.o. male MRN: [id number]  Unit/Bed#: SHREE Encounter: [id number]       Initial Sepsis Screening       Row Name 01/03/24 1617                Is the patient's history suggestive of a new or worsening infection? Yes (Proceed)  -AS        Suspected source of infection pneumonia;urinary tract infection  -AS        Indicate SIRS criteria Hyperthemia > 38.3C (100.9F) OR Hypothermia <36C (96.8F);Leukocytosis (WBC > 70445 IJL) OR Leukopenia (WBC <4000 IJL) OR Bandemia (WBC >10% bands)  -AS        Are two or more of the above signs & symptoms of infection both present and new to the patient? Yes (Proceed)  -AS        Assess for evidence of organ dysfunction: Are any of the below criteria present within 6 hours of suspected infection and SIRS criteria that are NOT considered to be chronic conditions? Lactate >/equal 4.0;INR > 1.5 or aPTT > 60 secs;Creatinine > 2.0  -AS        Date of presentation of severe sepsis --        Time of presentation of severe sepsis --        Date of presentation of septic shock --        Time of presentation of septic shock --        Fluid Resuscitation: 30 ml/kg IV fluid bolus will be given based on actual body weight  -AS        Is the patient is persistently hypotensive in the hour after fluid bolus administration? If yes, patient meets criteria for vasopressor use. YES  -AS        Sepsis Note: Click \"NEXT\" below (NOT \"close\") to generate sepsis note based on above information. YES (proceed by clicking \"NEXT\")  -AS                  User Key  (r) = Recorded By, (t) = Taken By, (c) = Cosigned By      Initials Name Provider Type    AS Analilia Sigala MD Resident                  Treating for severe sepsis. See my progress note for more details      Body mass index is 28.43 kg/m².  Wt Readings from Last 1 Encounters:   01/03/24 84.8 kg (186 lb 15.2 oz)     IBW (Ideal Body Weight): 68.4 kg    Ideal body weight: 68.4 kg (150 lb 12.7 oz)  Adjusted ideal body " weight: 75 kg (165 lb 4.1 oz)

## 2024-01-03 NOTE — H&P
[facility]  H&P  Name: Cezar Enciso 63 y.o. male I MRN: [id number]  Unit/Bed#: ICU 16 I Date of Admission: 1/3/2024   Date of Service: 1/3/2024 I Hospital Day: 0      Assessment/Plan   * Hypothermia  Assessment & Plan  Patient presents after being found outside on the ground (unwitnessed fall).  Patient with dementia unable to provide relevant story.  Temperature 30.2 C and bradycardia in 40s. BP slight high on admission and maintained stable.  Plan  Will require active rewarming with bear hugger and warm IV fluids.  Continue monitoring temperature.  See plan for severe sepsis.  Pending TSH to rule out hypothyroidism      Severe sepsis (HCC)  Assessment & Plan  Patient with hypothermia, elevated WBC of 40, elevated lactic acid 9.  VBG consistent with pH of 7.2, pCO2 48, pO2 29, bicarb of 20  Patient underwent CT chest abdomen and pelvis remarkable for cavitary lesion on the right upper lung concerning for cavitary pneumonia, there is also noted changes consistent with bilateral pyelonephritis.  Urinalysis showing WBC 4-10, RBC 4-10 granular casts of 10-25.    Plan:  Pending blood culture and MRSA culture  Status post undocumented 1 L of IV isolate in trauma bay.  Ordered 30 ml/kg of fluid resuscitation per sepsis protocol  Received in the ED Broad spectrum antibiotics with cefepime, vancomycin. Was switched to Zosyn.  Continue trending lactic acid until clears.  Continue rewarming with warm IV fluids and bear-hugger.        Paresthesia of upper extremity  Assessment & Plan  Patient reports acute on chronic tingling and numbness of the upper extremities.   Initially was a concern for spinal column trauma. The C spine MRI was done showing Diffuse osseous metastatic disease throughout the cervical and upper thoracic spine with some areas of extraosseous extension of disease most noteworthy at the left aspect of C7 and the right aspect of T1. The lesion at T1 appears to extend into the  neural foramen resulting in significant foraminal narrowing on the right.   Neurosurgery team consulted.  Patient will benefit from palliative care.  Plans for goals of care discussion     Increased anion gap metabolic acidosis  Assessment & Plan  On admission lactic acid 9.  Anion gap 19. Suspected in the setting of hypothermia versus severe sepsis.  Patient currently undergoing active rewarming with bear hugger and warmed IV fluids.  Patient also under sepsis protocol, see severe sepsis plan.  Currently lactic acid downtrending to 4.2.  Continue with hydration and lactic acid trending    Elevated troponin  Assessment & Plan  POA troponin at 0h 862, T 2h 699.  EKG with sinus bradycardia.  No significant ST abnormalities.  Likely type 2 non MI trop elevation in the setting of hypothermia and sepsis.    Unwitnessed fall  Assessment & Plan  Patient found outside on the ground.  Initially admitted as a trauma patient.  Ruled out any acute trauma and transferred to critical care service.  Noted UDS positive for oxycodone however patient admits taking oxy for pain. Denies alcohol use.    Cavitating mass in right upper lung lobe  Assessment & Plan  Cavitary mass in  right upper noted on CT chest.  Recently 12/20/23  underwent bronchoscopy with biopsy and was diagnosed with squamous cell carcinoma.  Patient will require hematology oncology follow-up.      COPD (chronic obstructive pulmonary disease) (HCC)  Assessment & Plan  Moderate COPD with severe emphysema .  Follows up with pulmonologist at [facility].  Spirometry/PFTs ordered for future  Patient on room air at baseline.  History of tobacco abuse 1 pack a day for the past 42 years. Quit at least a year ago  Currently taking varenicline for smoking cessation.    Abdominal aortic aneurysm (AAA) (HCC)  Assessment & Plan  According to current CT abdomen and pelvis: infrarenal abdominal aortic aneurysm, slightly increased in size since the prior study. Bilateral common  iliac artery aneurysms.   Patient follows with vascular team in the outpatient setting..  Currently under surveillance every 6 months.    Peripheral artery disease (HCC)  Assessment & Plan  History of peripheral artery disease status post femoral popliteal bypass.  Taking clopidogrel 75 mg, atorvastatin 80 mg and Zeita 10 mg daily.  Will continue home medication      Abnormal finding on CT scan  Assessment & Plan  CT chest abdomen and pelvis revealing similar findings that were also noted on noted on CT chest on 12/12/23:  Right suprahilar mass concerning for neoplasm as above.  Cavitary right upper and right lower lobe lesions as described above. These may reflect areas of cavitary pneumonia or neoplasm.  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  Mediastinal lymphadenopathy concerning for metastatic disease.  Bilateral adrenal gland masses concerning for metastatic disease.  Osseous lytic lesions concerning for metastatic disease.    Patient with recent history of bronchoscopic biopsy right upper lobe on 12/20/23 positive for non-small cell carcinoma most likely squamous cell carcinoma.  Recommended outpatient pulmonology, heme onc follow up  Patient may also benefit from palliative care service.    Hypercalcemia  Assessment & Plan  Presents with elevated Ca level 12.  Dehydration versus malignancy.  Will continue aggressive hydration and reassess CMP at 4 PM.  If calcium persistently high consider workup for hypercalcemia including PTHrP    ELIE (acute kidney injury) (HCC)  Assessment & Plan  Lab Results   Component Value Date    CREATININE 2.12 (H) 01/03/2024    EGFR 32 01/03/2024      Patient presenting with creatinine of 2.12.  According to previous results patient baseline 0.8-1.1  Urinalysis showing large blood, granular casts, CK elevated likely myoglobin related kidney injury.  Elevated Ph.  Patient will require aggressive hydration.  Avoid  nephrotoxic agents and hypoperfusion  Monitor daily I's and O's fluid status, and daily weight    Rhabdomyolysis  Assessment & Plan  Presents with CK in 6000s.  Patient was found on the floor unknown for how long he has been laying.  Labs with ELIE on admission.  Will require aggressive hydration.  Ordered 30 ml/kg for sepsis protocol of warmed IV Isolyte  Will continue with maintenance at 125 mill/hour.  Recheck CK          -------------------------------------------------------------------------------------------------------------  Chief Complaint: Unwitnessed fall, hypothermia    History of Present Illness   HX and PE limited by:   Cezar Enciso is a 63 y.o. male with PMH of AAA, PAD, COPD with emphysema, tobacco abuse, recently diagnosed with squamous cell carcinoma of the lung, hypertension, hyperlipidemia who presents after being found down outside by the neighbor about 5 am, unwitnessed fall. Patient mentions that he remembers going outside but does not remember the moment he fell  On EMS arrival patient was noted to be hypothermic with temperature of 30.2 C.  Patient was brought as a trauma patient.  Was assessed by trauma team and ruled out any acute fracture or internal organ damage.  Was transitioned to critical care service for the hypothermia management as well as hypercalcemia and metabolic acidosis.    History obtained from chart review and the patient.  -------------------------------------------------------------------------------------------------------------  Dispo: Admit to Stepdown Level 1    Code Status: Level 1 - Full Code  --------------------------------------------------------------------------------------------------------------  Review of Systems   Constitutional:  Negative for chills and fever.   HENT:  Negative for ear pain and sore throat.    Eyes:  Negative for pain and visual disturbance.   Respiratory:  Positive for shortness of breath (gurgling sounds). Negative for cough.     Cardiovascular:  Negative for chest pain and palpitations.   Gastrointestinal:  Negative for abdominal pain and vomiting.   Genitourinary:  Negative for dysuria and hematuria.   Musculoskeletal:  Positive for back pain. Negative for arthralgias.   Skin:  Negative for color change and rash.   Neurological:  Positive for numbness (upper extremites). Negative for seizures and syncope.   All other systems reviewed and are negative.        Physical Exam  Vitals and nursing note reviewed.   Constitutional:       General: He is not in acute distress.     Appearance: He is well-developed.      Comments: Unkempt    HENT:      Head: Normocephalic and atraumatic.   Eyes:      Conjunctiva/sclera: Conjunctivae normal.   Cardiovascular:      Rate and Rhythm: Normal rate and regular rhythm.      Heart sounds: No murmur heard.  Pulmonary:      Effort: No respiratory distress.      Breath sounds: Rhonchi present. No rales.   Abdominal:      Palpations: Abdomen is soft.      Tenderness: There is no abdominal tenderness.   Musculoskeletal:         General: No swelling.      Cervical back: Neck supple.      Right lower leg: No edema.      Left lower leg: No edema.   Skin:     General: Skin is warm and dry.      Capillary Refill: Capillary refill takes less than 2 seconds.   Neurological:      Mental Status: He is alert. Mental status is at baseline.      Sensory: No sensory deficit.      Motor: No weakness.   Psychiatric:         Mood and Affect: Mood normal.         Thought Content: Thought content normal.       --------------------------------------------------------------------------------------------------------------  Vitals:   Vitals:    01/03/24 1430 01/03/24 1500 01/03/24 1622 01/03/24 1700   BP: 117/73 135/63  130/85   Pulse: 75 84 84 82   Resp:   (!) 27 (!) 41   Temp: (!) 95.7 °F (35.4 °C) (!) 97.3 °F (36.3 °C) 97.8 °F (36.6 °C)    TempSrc:   Oral    SpO2: 95% 96%  95%   Weight:       Height:         Temp  Min: 85 °F (29.4  "°C)  Max: 97.8 °F (36.6 °C)  IBW (Ideal Body Weight): 68.4 kg  Height: 5' 8\" (172.7 cm)  Body mass index is 28.43 kg/m².    Laboratory and Diagnostics:  Results from last 7 days   Lab Units 01/03/24  1116 01/03/24  1113   WBC Thousand/uL 40.11*  --    HEMOGLOBIN g/dL 10.3*  --    I STAT HEMOGLOBIN g/dl  --  11.6*   HEMATOCRIT % 34.3*  --    HEMATOCRIT, ISTAT %  --  34*   PLATELETS Thousands/uL 234  --    BANDS PCT % 7  --    MONO PCT % 2*  --    EOS PCT % 1  --      Results from last 7 days   Lab Units 01/03/24  1533 01/03/24  1116 01/03/24  1113   SODIUM mmol/L 134* 134*  --    POTASSIUM mmol/L 4.7 5.3  --    CHLORIDE mmol/L 95* 94*  --    CO2 mmol/L 25 21  --    CO2, I-STAT mmol/L  --   --  22   ANION GAP mmol/L 14 19  --    BUN mg/dL 47* 42*  --    CREATININE mg/dL 1.90* 2.12*  --    CALCIUM mg/dL 10.2 11.8*  --    GLUCOSE RANDOM mg/dL 92 86  --    ALT U/L 48 35  --    AST U/L 325* 221*  --    ALK PHOS U/L 139* 146*  --    ALBUMIN g/dL 2.5* 2.9*  --    TOTAL BILIRUBIN mg/dL 2.31* 2.32*  --      Results from last 7 days   Lab Units 01/03/24  1116   MAGNESIUM mg/dL 2.7   PHOSPHORUS mg/dL 9.4*      Results from last 7 days   Lab Units 01/03/24  1116   INR  2.28*   PTT seconds 50*          Results from last 7 days   Lab Units 01/03/24  1533 01/03/24  1344 01/03/24  1116   LACTIC ACID mmol/L 3.6* 4.2* 9.1*     ABG:    VBG:          Micro:        EKG: Showing sinus bradycardia  Imaging: I have personally reviewed pertinent reports.   and I have personally reviewed pertinent films in PACS      Historical Information   History reviewed. No pertinent past medical history.  History reviewed. No pertinent surgical history.  Social History   Social History     Substance and Sexual Activity   Alcohol Use None     Social History     Substance and Sexual Activity   Drug Use Not on file     Social History     Tobacco Use   Smoking Status Unknown   Smokeless Tobacco Not on file       Family History:   History reviewed. No " "pertinent family history.  Family history unknown      Medications:  Current Facility-Administered Medications   Medication Dose Route Frequency    chlorhexidine (PERIDEX) 0.12 % oral rinse 15 mL  15 mL Mouth/Throat Q12H ALINA    multi-electrolyte (PLASMALYTE-A/ISOLYTE-S PH 7.4) IV solution 500 mL  500 mL Intravenous Once    multi-electrolyte (PLASMALYTE-A/ISOLYTE-S PH 7.4) IV solution  125 mL/hr Intravenous Continuous    piperacillin-tazobactam (ZOSYN) 3.375 g in sodium chloride 0.9 % 100 mL IVPB (EXTENDED-INFUSION)  3.375 g Intravenous Q8H    piperacillin-tazobactam (ZOSYN) 4.5 g in sodium chloride 0.9 % 100 mL IVPB  4.5 g Intravenous Once    [START ON 1/4/2024] vancomycin (VANCOCIN) IVPB (premix in dextrose) 1,000 mg 200 mL  12.5 mg/kg Intravenous Daily PRN     Home medications:  None     Allergies:  Not on File    ------------------------------------------------------------------------------------------------------------  Advance Directive and Living Will:      Power of :    POLST:    ------------------------------------------------------------------------------------------------------------  Anticipated Length of Stay is > 2 midnights    Care Time Delivered:   See attending's attestation      Analilia Sigala MD        Portions of the record may have been created with voice recognition software.  Occasional wrong word or \"sound a like\" substitutions may have occurred due to the inherent limitations of voice recognition software.  Read the chart carefully and recognize, using context, where substitutions have occurred      "

## 2024-01-03 NOTE — H&P
H&P - Trauma   Cezar Enciso 63 y.o. male MRN: [id number]  Unit/Bed#: ICU 16 Encounter: [id number]    Trauma Alert: Level B   Model of Arrival: Ambulance    Trauma Team: Attending Ullrich, Janay Gutierrez, and Paul Monaco  Consultants: medical critical care    Assessment/Plan   Active Problems / Assessment:   1.  Hypothermia  2.  Severe sepsis  3.  Increased anion gap metabolic acidosis  4.  Cervical spine tenderness with suspected paresthesias in upper extremities  5.  Elevated troponin  6.  Unwitnessed fall  7.  Mass in the right upper lung lobe  8.  COPD  9.  Mass noted of adrenal gland  10.  Hypercalcemia  11.  Rhabdomyolysis     Plan:   -Admit to medical critical care  - follow-up remainder of endpoints  -Stat MRI in the setting of continued cervical spine pain and paresthesias after reevaluation in the ED.  -Family updated at bedside  -Critical care team made aware of cervical spine tenderness and requirement for MRI  -Continue resuscitation; initiate antibiotics in the setting of sepsis  -Final reads of CT scan pending  -Surgical critical care attending assisted with disposition at bedside    Disposition: Admit to medical critical care with follow-up of MRI for cervical spine tenderness; if negative can remove cervical collar.    History of Present Illness     Chief Complaint: Found down  Mechanism:Other: Found down    HPI:    Cezar Enciso is a 63 y.o. male who presents with fall/found down.  Patient is a poor historian and unreliable.  Comes in as a GCS 14.  Hard of hearing.  It appears the patient has been down outside for extended period of time as extremities are exceedingly cold as well as his core temperature is in the 80s.  Most of history got through chart review.  Patient is following commands.  Noted to be present upon arrival.  Unclear of any anticoagulant or antiplatelet medication.    Review of Systems   Unable to perform ROS: Acuity of condition     12-point, complete review of  systems was reviewed and negative except as stated above.     Historical Information   Patient is a poor historian; unable to ascertain full surgical/medical history.  Most of history ascertained through chart review  History reviewed. No pertinent past medical history.  History reviewed. No pertinent surgical history.   Unable to obtain history due to Unreliable historian    Social History     Tobacco Use    Smoking status: Unknown   Vaping Use    Vaping status: Unknown       There is no immunization history on file for this patient.  Last Tetanus: no abrasions noted  Family History: Non-contributory     Meds/Allergies   PTA meds:   None     Allergies have not been reviewed;  Not on File    Objective   Initial Vitals:   Temperature: (!) 86.3 °F (30.2 °C) (01/03/24 1105)  Pulse: (!) 45 (01/03/24 1100)  Respirations: 14 (01/03/24 1100)  Blood Pressure: (!) 182/100 (01/03/24 1100)    Primary Survey:   Airway:        Status: patent;                Hospital Interventions: none  Breathing:        Pre-hospital Interventions: oxygen              Right breath sounds:        Left breath sounds:   Circulation:        Rhythm: regular       Rate: bradycardia   Right Pulses Left Pulses    R radial: 2+  R femoral: 2+  Audible by Doppler: Doppler pedal pulses.     L radial: 2+  L femoral: 2+  Audible by Doppler: Doppler pedal pulses.       Disability:        GCS: Eye: 4; Verbal: 4 Motor: 6 Total: 14       Right Pupil: round;  reactive         Left Pupil:  round;  reactive      R Motor Strength L Motor Strength    R : 4/5  R dorsiflex: 4/5  R plantarflex: 4/5 L : 4/5  L dorsiflex: 4/5  L plantarflex: 4/5          Exposure:           Secondary Survey:  Physical Exam  Vitals reviewed.   Constitutional:       Appearance: Normal appearance.   HENT:      Head: Normocephalic.      Nose: Nose normal.   Eyes:      Pupils: Pupils are equal, round, and reactive to light.   Cardiovascular:      Rate and Rhythm: Regular rhythm.  Bradycardia present.      Pulses: Normal pulses.   Pulmonary:      Effort: Pulmonary effort is normal. No respiratory distress.      Breath sounds: Normal breath sounds.   Chest:      Chest wall: No tenderness.   Abdominal:      General: There is no distension.      Palpations: Abdomen is soft.      Tenderness: There is no abdominal tenderness. There is no guarding.   Musculoskeletal:         General: No swelling or tenderness.      Cervical back: Normal range of motion. No tenderness.   Skin:     General: Skin is warm and dry.   Neurological:      General: No focal deficit present.      Mental Status: He is alert. He is disoriented.         Invasive Devices       Peripheral Intravenous Line  Duration             Peripheral IV 01/03/24 Left Antecubital <1 day    Peripheral IV 01/03/24 Right Forearm <1 day              Drain  Duration             Urethral Catheter Temperature probe 16 Fr. <1 day                  Lab Results: I have personally reviewed all pertinent laboratory/test results 01/03/24 and in the preceding 24 hours.  Recent Labs     01/03/24  1113 01/03/24  1116 01/03/24  1116 01/03/24  1344 01/03/24  1533   WBC  --  40.11*  --   --   --    HGB 11.6* 10.3*  --   --   --    HCT 34* 34.3*  --   --   --    PLT  --  234  --   --   --    BANDSPCT  --  7  --   --   --    SODIUM  --  134*  --   --   --    K  --  5.3  --   --   --    CL  --  94*  --   --   --    CO2 22 21  --   --   --    BUN  --  42*  --   --   --    CREATININE  --  2.12*  --   --   --    GLUC  --  86  --   --   --    CAIONIZED 1.48*  --   --   --   --    MG  --  2.7  --   --   --    PHOS  --  9.4*  --   --   --    AST  --  221*  --   --   --    ALT  --  35  --   --   --    ALB  --  2.9*  --   --   --    TBILI  --  2.32*  --   --   --    ALKPHOS  --  146*  --   --   --    PTT  --  50*  --   --   --    INR  --  2.28*  --   --   --    HSTNI0  --  862*  --   --   --    HSTNI2  --   --   --  669*  --    LACTICACID  --  9.1*   < > 4.2* 3.6*    < > =  values in this interval not displayed.       Imaging Results: I have personally reviewed pertinent images saved in PACS. CT scan findings (and other pertinent positive findings on images) were discussed with radiology. My interpretation of the images/reports are as follows:  Chest Xray(s): negative for acute findings   FAST exam(s): negative for acute findings   CT Scan(s): negative for acute findings   Additional Xray(s): negative for acute findings   Above imaging showed negative acute traumatic findings.  However there were chronic and acute medical/infectious etiologies that were present    Other Studies: No other studies    Code Status: Level 1 - Full Code  Advance Directive and Living Will:      Power of :    POLST:    I have spent 55 minutes with Patient and family today in which greater than 50% of this time was spent in counseling/coordination of care regarding Diagnostic results, Prognosis, Risks and benefits of tx options, Instructions for management, Patient and family education, Importance of tx compliance, Risk factor reductions, Impressions, Counseling / Coordination of care, Documenting in the medical record, Reviewing / ordering tests, medicine, procedures  , Obtaining or reviewing history  , and Communicating with other healthcare professionals .

## 2024-01-03 NOTE — ASSESSMENT & PLAN NOTE
Lab Results   Component Value Date    CREATININE 2.12 (H) 01/03/2024    EGFR 32 01/03/2024      Patient presenting with creatinine of 2.12.  According to previous results patient baseline 0.8-1.1  Urinalysis showing large blood, granular casts, CK elevated likely myoglobin related kidney injury.  Elevated Ph.  Patient will require aggressive hydration.  Avoid nephrotoxic agents and hypoperfusion  Monitor daily I's and O's fluid status, and daily weight

## 2024-01-03 NOTE — CASE MANAGEMENT
Case Management ED Progress Note    Patient name Cezar Enciso  Location TR/TR MRN [id number]  : 1960 Date 1/3/2024        OBJECTIVE:    Chief Complaint:   Patient Class: Emergency  Preferred Pharmacy: No Pharmacies Listed  Primary Care Provider: No primary care provider on file.    Primary Insurance:   Secondary Insurance:     ED Progress Note:  CM responded to trauma alert. Patient was transported into trauma bay by [facility] for evaluation. Patient intermittently responsive to medical team's questions and instructions. CM located patients emergency contact from [facility]- Lucrecia wang ([phone]). Call to  who is aware of situation and currently at [facility]. Trauma AP aware of above. No current identified CM needs. CM will follow and update screening, assessment, and discharge planning as appropriate.

## 2024-01-03 NOTE — ASSESSMENT & PLAN NOTE
CT chest abdomen and pelvis revealing similar findings that were also noted on noted on CT chest on 12/12/23:  Right suprahilar mass concerning for neoplasm as above.  Cavitary right upper and right lower lobe lesions as described above. These may reflect areas of cavitary pneumonia or neoplasm.  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  Mediastinal lymphadenopathy concerning for metastatic disease.  Bilateral adrenal gland masses concerning for metastatic disease.  Osseous lytic lesions concerning for metastatic disease.    Patient with recent history of bronchoscopic biopsy right upper lobe on 12/20/23 positive for non-small cell carcinoma most likely squamous cell carcinoma.  Recommended outpatient pulmonology, heme onc follow up  Patient may also benefit from palliative care service.

## 2024-01-03 NOTE — SEPSIS NOTE
"  Sepsis Note reassessment  Cezar Enciso 63 y.o. male MRN: [id number]  Unit/Bed#: SHREE Encounter: [id number]       Initial Sepsis Screening       Row Name 01/03/24 1617                Is the patient's history suggestive of a new or worsening infection? Yes (Proceed)  -AS        Suspected source of infection pneumonia;urinary tract infection  -AS        Indicate SIRS criteria Hyperthemia > 38.3C (100.9F) OR Hypothermia <36C (96.8F);Leukocytosis (WBC > 47284 IJL) OR Leukopenia (WBC <4000 IJL) OR Bandemia (WBC >10% bands)  -AS        Are two or more of the above signs & symptoms of infection both present and new to the patient? Yes (Proceed)  -AS        Assess for evidence of organ dysfunction: Are any of the below criteria present within 6 hours of suspected infection and SIRS criteria that are NOT considered to be chronic conditions? Lactate >/equal 4.0;INR > 1.5 or aPTT > 60 secs;Creatinine > 2.0  -AS        Date of presentation of severe sepsis --        Time of presentation of severe sepsis --        Date of presentation of septic shock --        Time of presentation of septic shock --        Fluid Resuscitation: 30 ml/kg IV fluid bolus will be given based on actual body weight  -AS        Is the patient is persistently hypotensive in the hour after fluid bolus administration? If yes, patient meets criteria for vasopressor use. YES  -AS        Sepsis Note: Click \"NEXT\" below (NOT \"close\") to generate sepsis note based on above information. YES (proceed by clicking \"NEXT\")  -AS                  User Key  (r) = Recorded By, (t) = Taken By, (c) = Cosigned By      Initials Name Provider Type    AS Analilia Sigala MD Resident                    Default Flowsheet Data (last 720 hours)       Sepsis Reassess       Row Name 01/03/24 1620                   Repeat Volume Status and Tissue Perfusion Assessment Performed    Date of Reassessment: 01/03/24  -AS        Time of Reassessment: --        Sepsis Reassessment " "Note: Click \"NEXT\" below (NOT \"close\") to generate sepsis reassessment note. YES (proceed by clicking \"NEXT\")  -AS        Repeat Volume Status and Tissue Perfusion Assessment Performed --                  User Key  (r) = Recorded By, (t) = Taken By, (c) = Cosigned By      Initials Name Provider Type    AS Analilia Sigala MD Resident                      Body mass index is 28.43 kg/m².  Wt Readings from Last 1 Encounters:   01/03/24 84.8 kg (186 lb 15.2 oz)     IBW (Ideal Body Weight): 68.4 kg    Ideal body weight: 68.4 kg (150 lb 12.7 oz)  Adjusted ideal body weight: 75 kg (165 lb 4.1 oz)    Lactic acid with improvement at 3.6. Body temperature 97.8 after active rewarming and warm fluids. Status post 1 L for sepsis protocol . Remained amount of fluids per sepsis protocol  ordered,  pending administration.   "

## 2024-01-03 NOTE — ASSESSMENT & PLAN NOTE
Patient reports acute on chronic tingling and numbness of the upper extremities.   Initially was a concern for spinal column trauma. The C spine MRI was done showing Diffuse osseous metastatic disease throughout the cervical and upper thoracic spine with some areas of extraosseous extension of disease most noteworthy at the left aspect of C7 and the right aspect of T1. The lesion at T1 appears to extend into the neural foramen resulting in significant foraminal narrowing on the right.   Neurosurgery team consulted.  Patient will benefit from palliative care.  Plans for goals of care discussion

## 2024-01-03 NOTE — PROCEDURES
POC FAST US    Date/Time: 1/3/2024 1:27 PM    Performed by: Quentin Gutierrez PA-C  Authorized by: Quentin Gutierrez PA-C    Patient location:  Trauma  Other Assisting Provider: Yes (comment) (Rosana Sanz PA-C)    Procedure details:     Exam Type:  Diagnostic    Indications comment:  Found down, hypothermic, unknown mechanism    Assess for:  Hemothorax, intra-abdominal fluid, pericardial effusion and pneumothorax    Technique: FAST      Views obtained:  Heart - Pericardial sac, LUQ - Splenorenal space, RUQ - Koenig's Pouch and Suprapubic - Pouch of Nav    Image quality: diagnostic      Image availability:  Images available in PACS  FAST Findings:     RUQ (Hepatorenal) free fluid: absent      LUQ (Splenorenal) free fluid: absent      Suprapubic free fluid: absent      Cardiac wall motion: identified      Pericardial effusion: absent    Interpretation:     Impressions: negative

## 2024-01-03 NOTE — ASSESSMENT & PLAN NOTE
Presents with elevated Ca level 12.  Dehydration versus malignancy.  Will continue aggressive hydration and reassess CMP at 4 PM.  If calcium persistently high consider workup for hypercalcemia including PTHrP

## 2024-01-03 NOTE — PROGRESS NOTES
Cezar Enciso is a 63 y.o. male who is currently ordered Vancomycin IV with management by the Pharmacy Consult service.  Relevant clinical data and objective / subjective history reviewed.  Vancomycin Assessment:  Indication and Goal AUC/Trough: Pneumonia (goal -600, trough >10)  Clinical Status:  new  Micro:   pending  Renal Function:  SCr: 2.12 mg/dL  CrCl: 37.7 mL/min  Renal replacement: Not on dialysis  Days of Therapy: 1  Current Dose: 2g IV loading dose  Vancomycin Plan:  New Dosing IV daily prn level <15  Next Level:  @ 0600  Renal Function Monitoring: Daily BMP and UOP  Pharmacy will continue to follow closely for s/sx of nephrotoxicity, infusion reactions and appropriateness of therapy.  BMP and CBC will be ordered per protocol. We will continue to follow the patient’s culture results and clinical progress daily.    Ruthie Melvin, Pharmacist

## 2024-01-03 NOTE — ASSESSMENT & PLAN NOTE
Patient found outside on the ground.  Initially admitted as a trauma patient.  Ruled out any acute trauma and transferred to critical care service.  Noted UDS positive for oxycodone however patient admits taking oxy for pain. Denies alcohol use.

## 2024-01-03 NOTE — ASSESSMENT & PLAN NOTE
Patient with hypothermia, elevated WBC of 40, elevated lactic acid 9.  VBG consistent with pH of 7.2, pCO2 48, pO2 29, bicarb of 20  Patient underwent CT chest abdomen and pelvis remarkable for cavitary lesion on the right upper lung concerning for cavitary pneumonia, there is also noted changes consistent with bilateral pyelonephritis.  Urinalysis showing WBC 4-10, RBC 4-10 granular casts of 10-25.    Plan:  Pending blood culture and MRSA culture  Status post undocumented 1 L of IV isolate in trauma bay.  Ordered 30 ml/kg of fluid resuscitation per sepsis protocol  Received in the ED Broad spectrum antibiotics with cefepime, vancomycin. Was switched to Zosyn.  Continue trending lactic acid until clears.  Continue rewarming with warm IV fluids and bear-hugger.

## 2024-01-04 PROBLEM — I46.9 CARDIAC ARREST (HCC): Status: ACTIVE | Noted: 2024-01-01

## 2024-01-04 NOTE — PROCEDURES
Intubation    Date/Time: 1/3/2024 11:07 PM    Performed by: AYAH Arriola  Authorized by: AYAH Arriola    Patient location:  Bedside  Consent:     Consent obtained:  Emergent situation    Consent given by:  Healthcare agent    Risks discussed:  Aspiration, brain injury, laryngeal injury, dental trauma, hypoxia, pneumothorax and death    Alternatives discussed:  No treatment  Universal protocol:     Radiology Images displayed and confirmed.  If images not available, report reviewed: yes      Required blood products, implants, devices, and special equipment available: yes      Patient identity confirmed:  Hospital-assigned identification number and arm band  Pre-procedure details:     Patient status:  Unresponsive    Mallampati score:  2    Pretreatment medications:  Etomidate    Paralytics:  Succinylcholine  Indications:     Indications for intubation: airway protection      Indications for intubation comment:  Cardiac arrest  Procedure details:     Preoxygenation:  Bag valve mask    CPR in progress: no      Intubation method:  Oral    Oral intubation technique:  Glidescope    Laryngoscope blade:  Mac 3    Tube size (mm):  8.0    Tube type:  Cuffed    Number of attempts:  1    Tube visualized through cords: yes    Placement assessment:     ETT to lip:  25cm    Tube secured with:  ETT small    Breath sounds:  Equal    Placement verification: chest rise, condensation, CXR verification, direct visualization, equal breath sounds, ETCO2 detector and tube exhalation      CXR findings:  ETT in proper place (6.5cm above tobi, advance 2cm)    Ventilator settings:  AC/VC 18/450/100/6  Post-procedure details:     Patient tolerance of procedure:  Tolerated well, no immediate complications  Comments:      See code documentation for further details. Patients sister Lucrecia Mendez was called post intubation and made aware of event leading up to intubation and necessity for intubation.

## 2024-01-04 NOTE — CONSULTS
Consultation - Neurosurgery   Cezar Enciso 63 y.o. male MRN: [id number]  Unit/Bed#: ICU 16 Encounter: [id number]    Images reviewed at morning rounds on 1/4/2024 at 7 AM the patient was seen    Inpatient consult to Neurosurgery  Consult performed by: Trey Garcia PA-C  Consult ordered by: AYAH Costa        Assessment/Plan               Assessment:  Cervical neural foraminal stenosis at C6-7  Cervical spondylosis  Severe sepsis with hypothermia  Pyelonephritis  Cardiac arrest  Cavitary lung lesions  Multiple organ metastasis      Plan:   Exam: Patient GCS 9-10T, on propofol, Fentanyl, PERRL, 3 mm bilat, slightly moves extremities with painful stimulus, DTR Depressed throughout , otherwise exam limited.  Imaging is reviewed personally and findings as follows:  MRI of cervical spine without contrast on 1/3/2024 demonstrates diffuse osseous metastatic disease throughout the cervical and upper thoracic spine with some areas of extraosseous extension of disease most noteworthy at the left aspect of C7 and the right aspect of T1.  The lesion at T1 appears to extend into the neuroforamen resulting in significant foraminal narrowing on the right.  No cord compression or abnormal signal to suggest central cord lesion.  Multilevel cervical spondylotic degenerative changes superimposed upon metastasis disease with significant foraminal narrowing at C6-7, left greater than right-side.  CT of cervical spine without contrast 1/3/2024 demonstrates no acute fracture but osseous lytic lesion with metastatic disease or myeloma.  Moderate cervical spondylosis.  Bilious emphysema with loculated right pleural effusion partially visualized.  CT chest abdomen and pelvis with contrast demonstrates no definite acute traumatic CT finding is right suprahilar mass concerning for neoplasm cavitary right upper and right lower lobe lesion is made reflect areas of cavitary pneumonia or neoplasm, bullous emphysema with large bulla  in the right upper lobe apex with pleural effusion, osseous lytic lesions, and infrarenal abdominal aortic aneurysm slightly increased in size since prior study, mediastinal lymphadenopathy bilateral adrenal masses concerning for metastatic disease.  CT head without contrast 1/4/2024 demonstrates borderline urinary factors without acute renal hemorrhage or depressed calvarial fracture.  Pain control: Per primary team  DVT ppx: SCDs bilateral legs  Seizure ppx: None  Activity: As tolerated  PT/OT evaluation & treatment  Brace: None  Medical Mx: Per primary team  Neurocheck: Close neuromonitoring  Recommend radiation oncology evaluation  Patient appears critical sick, with -severe sepsis, pyelonephritis, an episode of cardiac arrest requiring CPR,  cavitary  lung lesion likely pneumonia vs Tumor. He is  intubated, on propofol and fentanyl Neurological exam is limited. Given extensive cx and thoracic mets, recommend Rad-oncology eval, repeat MRI of Cx, thorax and Lumbar  spines with contrast  for mets surveillance, multimodal pain meds, PT/OT. Will re-examine the patient when he is awake. No emergency Neurosurgical procedure at this time. Consider OP follow up. Call with question or concern.      History of Present Illness     HPI: Cezar Enciso is a 63 y.o. male with PMHx of COPD with emphysema, tobacco abuse, Squamous cell cancer of Lung ( recently diagnosed) HTN, Hyperlipidemia, AAA, PAD,  brought in to ED after he was found down outside by his neighbor. Difficult to get info, but Per EMR, patient was hypothermic on arrival at 30.2 C, further work ups also detected patient fulfilling criteria for severe sepsis, electrolytes derangements, and metabolic acidosis and pyelonephritis.  The meantime, patient went into cardiac arrest requiring CPR for about 1 minutes with ROSC.patient is intubated .patient also complains paresthesia and numbness in the hands and neck pain. CT cervical spine shows no cervical spine  fracture or traumatic malalignment, MRI cervical spine without contrast shows extensive bony metastases lesions throughout cervical spine and upper part of the thoracic spine, with extension into the neuroforamen of the lower cervical thoracic junction.  Imaging also demonstrates chronic degenerative changes with multilevel cervical spondylosis, no cord signal abnormality.      Review of Systems   Constitutional:         Limited information   HENT:          Limited information   Eyes:         Limited information   Respiratory:          Intubated, limited information   Gastrointestinal:         Limited information   Genitourinary:         Armstrong catheter in place otherwise limited information   Musculoskeletal:         Limited information   Neurological:         Limited information   Psychiatric/Behavioral:          GCS 9/10 T, otherwise limited information       Historical Information   History reviewed. No pertinent past medical history.  History reviewed. No pertinent surgical history.  Social History     Substance and Sexual Activity   Alcohol Use None     Social History     Substance and Sexual Activity   Drug Use Never     Social History     Tobacco Use   Smoking Status Unknown   Smokeless Tobacco Not on file     History reviewed. No pertinent family history.    Meds/Allergies   all current active meds have been reviewed and current meds:   Current Facility-Administered Medications   Medication Dose Route Frequency    albuterol (PROVENTIL HFA,VENTOLIN HFA) inhaler 1 puff  1 puff Inhalation Q6H PRN    chlorhexidine (PERIDEX) 0.12 % oral rinse 15 mL  15 mL Mouth/Throat Q12H ALINA    chlorhexidine (PERIDEX) 0.12 % oral rinse 15 mL  15 mL Mouth/Throat Q12H ALINA    fentaNYL 1000 mcg in sodium chloride 0.9% 100mL infusion  50 mcg/hr Intravenous Continuous    fentanyl citrate (PF) 100 MCG/2ML 50 mcg  50 mcg Intravenous Q1H PRN    heparin (porcine) subcutaneous injection 5,000 Units  5,000 Units Subcutaneous Q8H ALINA     HYDROmorphone (DILAUDID) injection 0.5 mg  0.5 mg Intravenous Q4H PRN    insulin regular (HumuLIN R,NovoLIN R) injection 10 Units  10 Units Intravenous Once    multi-electrolyte (PLASMALYTE-A/ISOLYTE-S PH 7.4) IV solution  125 mL/hr Intravenous Continuous    NOREPINEPHRINE 4 MG  ML NSS (CMPD ORDER) infusion  1-30 mcg/min Intravenous Titrated    piperacillin-tazobactam (ZOSYN) 3.375 g in sodium chloride 0.9 % 100 mL IVPB (EXTENDED-INFUSION)  3.375 g Intravenous Q8H    propofol (DIPRIVAN) 1000 mg in 100 mL infusion (premix)  5-50 mcg/kg/min Intravenous Titrated     Not on File    Objective   I/O         01/02 0701 01/03 0700 01/03 0701 01/04 0700    I.V. (mL/kg)  7659.3 (98.3)    NG/GT  350    Total Intake(mL/kg)  8009.3 (102.8)    Urine (mL/kg/hr)  1240    Total Output  1240    Net  +6769.3                  Physical Exam  Constitutional:       Comments: GCS 9/10 T   HENT:      Head: Atraumatic.   Eyes:      Pupils: Pupils are equal, round, and reactive to light.   Musculoskeletal:      Comments: Limited exam   Neurological:      GCS: GCS eye subscore is 3. GCS verbal subscore is 1. GCS motor subscore is 5.      Deep Tendon Reflexes: Babinski sign absent on the right side. Babinski sign absent on the left side.      Reflex Scores:       Tricep reflexes are 1+ on the right side and 1+ on the left side.       Bicep reflexes are 1+ on the right side and 1+ on the left side.       Brachioradialis reflexes are 1+ on the right side and 1+ on the left side.       Patellar reflexes are 1+ on the right side and 1+ on the left side.       Achilles reflexes are 1+ on the right side and 1+ on the left side.     Comments: Some exams limited        Neurologic Exam     Mental Status   Limited exam     Cranial Nerves     CN III, IV, VI   Pupils are equal, round, and reactive to light.  Right pupil: Size: 3 mm. Shape: regular. Reactivity: brisk.   Left pupil: Size: 3 mm. Shape: regular. Reactivity: brisk.   Limited exam  "    Motor Exam   Muscle bulk: normal  Overall muscle tone: normal  Right arm tone: normal  Left arm tone: normal  Right leg tone: normal  Left leg tone: normalSome exams limited     Sensory Exam   Limited exam     Gait, Coordination, and Reflexes     Reflexes   Right brachioradialis: 1+  Left brachioradialis: 1+  Right biceps: 1+  Left biceps: 1+  Right triceps: 1+  Left triceps: 1+  Right patellar: 1+  Left patellar: 1+  Right achilles: 1+  Left achilles: 1+  Right Bautista: absent  Left Bautista: absent  Right ankle clonus: absent  Left ankle clonus: absentExams limited       Vitals:Blood pressure 93/68, pulse 76, temperature 98.8 °F (37.1 °C), resp. rate (!) 26, height 5' 8\" (1.727 m), weight 77.9 kg (171 lb 11.8 oz), SpO2 94%.,Body mass index is 26.11 kg/m².     Lab Results:   Results from last 7 days   Lab Units 01/04/24 0442 01/03/24 2257 01/03/24  1116   WBC Thousand/uL 30.34* 29.30* 40.11*   HEMOGLOBIN g/dL 9.6* 10.3* 10.3*   HEMATOCRIT % 30.3* 34.6* 34.3*   PLATELETS Thousands/uL 221 251 234   MONO PCT % 2*  --  2*   EOS PCT % 0  --  1     Results from last 7 days   Lab Units 01/04/24 0442 01/03/24 2257 01/03/24  1533 01/03/24  1116 01/03/24  1116 01/03/24  1113   POTASSIUM mmol/L 5.7* 5.4* 4.7   < > 5.3  --    CHLORIDE mmol/L 98 99 95*   < > 94*  --    CO2 mmol/L 23 23 25   < > 21  --    CO2, I-STAT mmol/L  --   --   --   --   --  22   BUN mg/dL 51* 52* 47*   < > 42*  --    CREATININE mg/dL 1.73* 1.87* 1.90*   < > 2.12*  --    CALCIUM mg/dL 9.3 10.5* 10.2   < > 11.8*  --    ALK PHOS U/L  --  129* 139*  --  146*  --    ALT U/L  --  59* 48  --  35  --    AST U/L  --  336* 325*  --  221*  --    GLUCOSE, ISTAT mg/dl  --   --   --   --   --  89    < > = values in this interval not displayed.     Results from last 7 days   Lab Units 01/04/24 0442 01/03/24 2257 01/03/24  1116   MAGNESIUM mg/dL 2.8* 2.9* 2.7     Results from last 7 days   Lab Units 01/04/24 0442 01/03/24 2257 01/03/24  1116   PHOSPHORUS " "mg/dL 7.0* 8.4* 9.4*     Results from last 7 days   Lab Units 01/03/24  1116   INR  2.28*   PTT seconds 50*     No results found for: \"TROPONINT\"  ABG:  Lab Results   Component Value Date    PHART 7.192 (LL) 01/03/2024    ETF3WBC 56.3 (HH) 01/03/2024    PO2ART 101.9 01/03/2024    OBP3FHH 21.1 (L) 01/03/2024    BEART -7.2 01/03/2024    SOURCE Artery 01/03/2024       Imaging Studies: I have personally reviewed pertinent reports.   and I have personally reviewed pertinent films in PACS    EKG, Pathology, and Other Studies: I have personally reviewed pertinent reports.   and I have personally reviewed pertinent films in PACS    VTE Prophylaxis: Sequential compression device (Venodyne)     Code Status: Level 1 - Full Code  Advance Directive and Living Will:      Power of :    POLST:      Counseling / Coordination of Care  I spent 20 minutes with the patient.    "

## 2024-01-04 NOTE — ASSESSMENT & PLAN NOTE
Lab Results   Component Value Date    CREATININE 1.87 (H) 01/03/2024    CREATININE 1.90 (H) 01/03/2024    CREATININE 2.12 (H) 01/03/2024    EGFR 37 01/03/2024    EGFR 36 01/03/2024    EGFR 32 01/03/2024      Patient presenting with creatinine of 2.12.  According to previous results patient baseline 0.8-1.1  Urinalysis showing large blood, granular casts, CK elevated likely myoglobin related kidney injury.  Continue IV hydration  Avoid nephrotoxic agents and hypoperfusion  Monitor daily I's and O's fluid status, and daily weight

## 2024-01-04 NOTE — PLAN OF CARE
Problem: Prexisting or High Potential for Compromised Skin Integrity  Goal: Skin integrity is maintained or improved  Description: INTERVENTIONS:  - Identify patients at risk for skin breakdown  - Assess and monitor skin integrity  - Assess and monitor nutrition and hydration status  - Monitor labs   - Assess for incontinence   - Turn and reposition patient  - Assist with mobility/ambulation  - Relieve pressure over bony prominences  - Avoid friction and shearing  - Provide appropriate hygiene as needed including keeping skin clean and dry  - Evaluate need for skin moisturizer/barrier cream  - Collaborate with interdisciplinary team   - Patient/family teaching  - Consider wound care consult   Outcome: Progressing     Problem: PAIN - ADULT  Goal: Verbalizes/displays adequate comfort level or baseline comfort level  Description: Interventions:  - Encourage patient to monitor pain and request assistance  - Assess pain using appropriate pain scale  - Administer analgesics based on type and severity of pain and evaluate response  - Implement non-pharmacological measures as appropriate and evaluate response  - Consider cultural and social influences on pain and pain management  - Notify physician/advanced practitioner if interventions unsuccessful or patient reports new pain  Outcome: Progressing     Problem: INFECTION - ADULT  Goal: Absence or prevention of progression during hospitalization  Description: INTERVENTIONS:  - Assess and monitor for signs and symptoms of infection  - Monitor lab/diagnostic results  - Monitor all insertion sites, i.e. indwelling lines, tubes, and drains  - Monitor endotracheal if appropriate and nasal secretions for changes in amount and color  - [place] appropriate cooling/warming therapies per order  - Administer medications as ordered  - Instruct and encourage patient and family to use good hand hygiene technique  - Identify and instruct in appropriate isolation precautions for  identified infection/condition  Outcome: Progressing  Goal: Absence of fever/infection during neutropenic period  Description: INTERVENTIONS:  - Monitor WBC    Outcome: Progressing     Problem: SAFETY ADULT  Goal: Patient will remain free of falls  Description: INTERVENTIONS:  - Educate patient/family on patient safety including physical limitations  - Instruct patient to call for assistance with activity   - Consult OT/PT to assist with strengthening/mobility   - Keep Call bell within reach  - Keep bed low and locked with side rails adjusted as appropriate  - Keep care items and personal belongings within reach  - Initiate and maintain comfort rounds  - Make Fall Risk Sign visible to staff  - Offer Toileting every 2 Hours, in advance of need  - Initiate/Maintain bed alarm  - Obtain necessary fall risk management equipment: bracelet  - Apply yellow socks and bracelet for high fall risk patients  - Consider moving patient to room near nurses station  Outcome: Progressing  Goal: Maintain or return to baseline ADL function  Description: INTERVENTIONS:  -  Assess patient's ability to carry out ADLs; assess patient's baseline for ADL function and identify physical deficits which impact ability to perform ADLs (bathing, care of mouth/teeth, toileting, grooming, dressing, etc.)  - Assess/evaluate cause of self-care deficits   - Assess range of motion  - Assess patient's mobility; develop plan if impaired  - Assess patient's need for assistive devices and provide as appropriate  - Encourage maximum independence but intervene and supervise when necessary  - Involve family in performance of ADLs  - Assess for home care needs following discharge   - Consider OT consult to assist with ADL evaluation and planning for discharge  - Provide patient education as appropriate  Outcome: Progressing  Goal: Maintains/Returns to pre admission functional level  Description: INTERVENTIONS:  - Perform AM-PAC 6 Click Basic Mobility/ Daily  Activity assessment daily.  - Set and communicate daily mobility goal to care team and patient/family/caregiver.   - Collaborate with rehabilitation services on mobility goals if consulted  - Perform Range of Motion 3 times a day.  - Reposition patient every 2 hours.  - Dangle patient 3 times a day  - Stand patient 3 times a day  - Ambulate patient 3 times a day  - Out of bed to chair 3 times a day   - Out of bed for meals 3 times a day  - Out of bed for toileting  - Record patient progress and toleration of activity level   Outcome: Progressing     Problem: DISCHARGE PLANNING  Goal: Discharge to home or other facility with appropriate resources  Description: INTERVENTIONS:  - Identify barriers to discharge w/patient and caregiver  - Arrange for needed discharge resources and transportation as appropriate  - Identify discharge learning needs (meds, wound care, etc.)  - Arrange for interpretive services to assist at discharge as needed  - Refer to Case Management Department for coordinating discharge planning if the patient needs post-hospital services based on physician/advanced practitioner order or complex needs related to functional status, cognitive ability, or social support system  Outcome: Progressing     Problem: Knowledge Deficit  Goal: Patient/family/caregiver demonstrates understanding of disease process, treatment plan, medications, and discharge instructions  Description: Complete learning assessment and assess knowledge base.  Interventions:  - Provide teaching at level of understanding  - Provide teaching via preferred learning methods  Outcome: Progressing

## 2024-01-04 NOTE — ACP (ADVANCE CARE PLANNING)
Critical Care Advanced Care Planning Note  Cezar Enciso 63 y.o. male MRN: [id number]  Unit/Bed#: ICU 16 Encounter: [id number]    Cezar Enciso is a 63 y.o. male requiring critical care evaluation and advanced care planning. The patient has chronic comorbidities, including but not limited to stage IV metastatic lung cancer, which is now further complicated by the following acute conditions: acute respiratory failure, status postcardiac arrest, .  Due to the severity of the patient's acute condition and/or the extent of chronic conditions, additional conversations pertaining to advanced care planning were required. Today's discussion, which was held in a face-to-face meeting, included  2 sons Lele and Wang, daughter-in-law, Sister Lucrecia , and it was established that all stake holders understood the rationale for the advanced care planning. The patient was unable to participate in the discussion due to respiratory failure on mechanical ventilation.    Summary of Discussion:  I initially met with the patient's Sister Anaya alone.  She told me that the patient does not have will/medical power of .  She told me that he has 4 sons, 2 of which are local and he is in contact with.  The other 2 are estranged, with no ability to contact them.    I spoke with the patient's oldest son Lele over the phone.  Explained that he would be one of his decision makers.  He states that he will leave work, and come to the hospital.    After everyone arrived, we discussed that based off his current clinical/medical situation that he is not a candidate for treatment of his malignancy.  He has widely metastatic squamous cell cancer that is causing him pain and discomfort.  They were all agreeable that he would not want to be on life support machines knowing that he will not survive the malignancy.  They would like to focus on his comfort.    Plan for transition to level 4 CODE STATUS.  Will give medications  for terminal extubation.      Total time spent, (45) minutes (2 PM to 6 PM).      CODE STATUS: COMFORT CARE - Level 4  POA:    POLST:      SIGNATURE: Megha Hall DO  DATE: January 4, 2024  TIME: 6:14 PM

## 2024-01-04 NOTE — ASSESSMENT & PLAN NOTE
CT chest abdomen and pelvis revealing similar findings that were also noted on noted on CT chest on 12/12/23:  Right suprahilar mass concerning for neoplasm as above.  Cavitary right upper and right lower lobe lesions as described above. These may reflect areas of cavitary pneumonia or neoplasm.  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  Mediastinal lymphadenopathy concerning for metastatic disease.  Bilateral adrenal gland masses concerning for metastatic disease.  Osseous lytic lesions concerning for metastatic disease.    Patient with recent history of bronchoscopic biopsy right upper lobe on 12/20/23 positive for non-small cell carcinoma most likely squamous cell carcinoma.  Recommended outpatient pulmonology, heme onc follow up  Patient may also benefit from palliative care service.  Patient receiving oxycodone as outpatient.  Ordered Dilaudid as needed since patient is complaining of neck pain

## 2024-01-04 NOTE — ASSESSMENT & PLAN NOTE
Patient presents after being found outside on the ground (unwitnessed fall).  Patient with dementia unable to provide relevant story.  POA Temperature 30.2 C and bradycardia in 40s. BP slight high on admission and maintained stable.  Patient is now normotensive  TSH normal  Plan  Continue to monitor

## 2024-01-04 NOTE — SPEECH THERAPY NOTE
Speech Language/Pathology    ST orders received for bedside swallow evaluation. Pt is currently intubated. Canceling ST order at this time. Please re-consult when medical status improves.

## 2024-01-04 NOTE — ASSESSMENT & PLAN NOTE
Patient with hypothermia, elevated WBC of 40, elevated lactic acid 9.  VBG consistent with pH of 7.2, pCO2 48, pO2 29, bicarb of 20  Patient underwent CT chest abdomen and pelvis remarkable for cavitary lesion on the right upper lung concerning for cavitary pneumonia, there is also noted changes consistent with bilateral pyelonephritis.  Urinalysis showing WBC 4-10, RBC 4-10 granular casts of 10-25.    Plan:  Pending blood culture and MRSA culture  Status post undocumented 1 L of IV isolate in trauma bay.  Ordered 30 ml/kg of fluid resuscitation per sepsis protocol  Continue Zosyn  Continue trending lactic acid until clears.  Continue rewarming with warm IV fluids and bear-hugger.

## 2024-01-04 NOTE — ASSESSMENT & PLAN NOTE
Patient was found to be unresponsive on 1/3/2023 at night  On pulse check, patient did not have a palpable pulse, with PEA rhythm.   CPR was initiated patient was given 1 dose of epinephrine.  ROSC was achieved after around a minute.    Intubated. Settings ACVC 18/450/100/10  CBC shows WBC of 29.3, which is downtrending from 40 on admission  ABG 7.192, 56, 21.1, => 7. 232, 60  Lactic acid 7.5  Troponin 1062 (on admission 862 => 669 => 732)  Potassium 5.4  Creatinine downtrending 1.87     Unknown etiology.  Possibly from hypovolemia in the setting of sepsis.  Acidosis also possible since patient's pH was 7.192.  Patient does not have marked hyperkalemia.  Patient is also no longer hypothermic.  X-ray did not show pneumothorax.  Ultrasound did not show any cardiac effusion.  Bedside ultrasound done and patient appears volume depleted.      Ordered another 1 L  bolus extra  Continue Isolyte 250 mill mL per hour  Increased tidal volume to 450

## 2024-01-04 NOTE — RESPIRATORY THERAPY NOTE
"RT Protocol Note  Cezar Enciso 63 y.o. male MRN: [id number]  Unit/Bed#: ICU 16 Encounter: [id number]    Assessment    Principal Problem:    Cardiac arrest (HCC)  Active Problems:    Severe sepsis (HCC)    Hypothermia    Rhabdomyolysis    ELIE (acute kidney injury) (HCC)    Hypercalcemia    Abnormal finding on CT scan    Peripheral artery disease (HCC)    Abdominal aortic aneurysm (AAA) (HCC)    COPD (chronic obstructive pulmonary disease) (HCC)    Cavitating mass in right upper lung lobe    Unwitnessed fall    Elevated troponin    Increased anion gap metabolic acidosis    Paresthesia of upper extremity      Home Pulmonary Medications:  Albuterol MDI  Home Devices/Therapy: (P) Other (Comment) (none)    History reviewed. No pertinent past medical history.  Social History     Socioeconomic History    Marital status: Single     Spouse name: None    Number of children: None    Years of education: None    Highest education level: None   Occupational History    None   Tobacco Use    Smoking status: Unknown    Smokeless tobacco: None   Vaping Use    Vaping status: Unknown   Substance and Sexual Activity    Alcohol use: None    Drug use: Never    Sexual activity: None   Other Topics Concern    None   Social History Narrative    None     Social Determinants of Health     Financial Resource Strain: Not on file   Food Insecurity: Not on file   Transportation Needs: Not on file   Physical Activity: Not on file   Stress: Not on file   Social Connections: Not on file   Intimate Partner Violence: Not on file   Housing Stability: Not on file       Subjective         Objective    Physical Exam:   Assessment Type: Assess only  General Appearance: Sedated  Respiratory Pattern: Assisted  Chest Assessment: Chest expansion symmetrical  Bilateral Breath Sounds: Diminished  Cough: Weak    Vitals:  Blood pressure 91/53, pulse 82, temperature 98.6 °F (37 °C), temperature source Oral, resp. rate (!) 26, height 5' 8\" (1.727 m), weight 77.9 " kg (171 lb 11.8 oz), SpO2 98%.    Results from last 7 days   Lab Units 01/04/24  1204 01/03/24  2328   PH ART   --  7.192*   PCO2 ART mm Hg  --  56.3*   PO2 ART mm Hg  --  101.9   HCO3 ART mmol/L  --  21.1*   BASE EXC ART mmol/L  --  -7.2   O2 CONTENT ART mL/dL  --  14.2*   O2 HGB, ARTERIAL %  --  93.5*   ABG SOURCE   --  Artery   FACUNDO TEST  No  --        Imaging and other studies: I have personally reviewed pertinent reports.            Plan    Respiratory Plan: Vent/NIV/HFNC        Resp Comments: (P) per pt family, hx of COPD. home albuterol inhaler, some occupational hx. BBS are diminished and clear. Will continue to monitor

## 2024-01-04 NOTE — ASSESSMENT & PLAN NOTE
Lab Results   Component Value Date    CORRECTEDCA 11.7 (H) 01/03/2024    CORRECTEDCA 11.4 (H) 01/03/2024    CORRECTEDCA 12.7 (HH) 01/03/2024       Dehydration versus malignancy.  Continue IV hydration  If calcium persistently high consider workup for hypercalcemia including PTHrP

## 2024-01-04 NOTE — ASSESSMENT & PLAN NOTE
On admission lactic acid 9.  Anion gap 19. Suspected in the setting of hypothermia versus severe sepsis.  Lactic acid down to 4, AG of 17.   Continue IV hydration

## 2024-01-04 NOTE — UTILIZATION REVIEW
"Initial Clinical Review    Admission: Date/Time/Statement:   Admission Orders (From admission, onward)       Ordered        01/03/24 1231  Inpatient Admission  Once                          Orders Placed This Encounter   Procedures    Inpatient Admission     Standing Status:   Standing     Number of Occurrences:   1     Order Specific Question:   Level of Care     Answer:   Level 1 Stepdown [13]     Order Specific Question:   Estimated length of stay     Answer:   More than 2 Midnights     Order Specific Question:   Certification     Answer:   I certify that inpatient services are medically necessary for this patient for a duration of greater than two midnights. See H&P and MD Progress Notes for additional information about the patient's course of treatment.     ED Arrival Information       Expected   1/3/2024 10:58    Arrival   1/3/2024 10:58    Acuity   Emergent              Means of arrival   Ambulance    Escorted by   [facility]    Service   Critical Care/ICU    Admission type   Emergency              Arrival complaint   -             Chief Complaint   Patient presents with    Trauma     PT found down outside for unknown amount of time per EMS \"temp read LOW\"       Initial Presentation: 63 y[place] due to Hypothermia/Cavitary pneumonia/Sepsis/Traumatic rhabdomyolysis/Traumatic abrasions/ELIE/Paraesthesias of upper extremities likely secondary to metastatic disease/Chronic pain .  History of Metastatic lung cancer with metastasis to b/l adrenals, evidence of bony metastasis with concerns for tumor invading the cervical and thoracic spine. Undergoing work-up at [facility], recently had biopsy with plans to discuss treatment options.  Presented after being found down outside on day of arrival.  Per ems hypothermic.    On exam: poor historian.   Extremities cold.   Core temp in 80s.   Following commands.  Pedal pulses by doppler.   Bradycardic.  Disoriented.   Wbc 40.11.  " H&H 11.6/34.   bun 42, creatinine 2.12 with baseline of 0.8 - 1 .   .   Total bilirubin 2.32.   INR 2.28.   hs tnl 862>669.  Lactic acid 9.1>4.2>3.6.   UDS + oxycodone.    Corrected calcium 12.7.    CK 6013.   Ethanol 15. CT chest abdomen and pelvis showed cavitary lesion on the right upper lung concerning for cavitary pneumonia, there is also noted changes consistent with bilateral pyelonephritis.  MRI C spine showed diffuse metastatic disease.  In the ED active re warming.   Sarted on antibiotics and given 1 liter IVF.    Plan is continue rewarming with bear hugger and warm IVF.  Monitor temp  follow cltures.   30 ml/Kg of IVF resuscitation.   Switch antibiotics to Zosyn.   Trend Lactic acid, CMP , CK   Continue home clopidogrel 75 mg, atorvastatin 80 mg and Zeita 10 mg daily.     CODE Blue 1/3:  unresponsive, earlier had complained of pain in neck area and given dilaudid.    On pulse check, patient did not have a palpable pulse, with PEA rhythm.  CPR was initiated patient was given 1 dose of epinephrine.  ROSC was achieved after around a minute.  Patient was intubated.   1/3/24 Intubated.       Date: 1/4/24    Day 2: intubated.  On exam: appears ill and toxic.   Intubated.  Lungs rales.   Unresponsive.  Settings ACVC 18/450/100/10.  Wbc 29.3.   lactic acid 7.5.   troponin 1062.  Creatinine 1.87.  CK to 33762  plan is additional IVF of 1 liter, Isolyte at 250 ml.   Tidal volume increase.  Continue antibiotics.    Rewarming.    Goals of care discussion.     1/4/24 per neuro surgery - patient had MRI of cervical spine showing oncologic  disease and some foraminal narrowing at right side C 7-T1.   No indication for surgery as no  severe canal incursion.   Ultimately, tissue diagnosis will be necessary if not known, and then consult to radiation oncology appropriate for local therapy, versus medical oncology for systemic therapy to manage the spine disease.       ED Triage Vitals   Temperature Pulse  Respirations Blood Pressure SpO2   01/03/24 1105 01/03/24 1100 01/03/24 1100 01/03/24 1100 01/03/24 1110   (!) 86.3 °F (30.2 °C) (!) 45 14 (!) 182/100 90 %      Temp Source Heart Rate Source Patient Position - Orthostatic VS BP Location FiO2 (%)   01/03/24 1105 01/03/24 1100 01/03/24 1310 01/03/24 1909 01/04/24 0800   Rectal Monitor Lying Left arm 80      Pain Score       01/03/24 1700       No Pain          Wt Readings from Last 1 Encounters:   01/04/24 77.9 kg (171 lb 11.8 oz)     Additional Vital Signs:   01/04/24 1100 98.9 °F (37.2 °C) 75 29 Abnormal  88/58 Abnormal  68 100 % -- Ventilator Lying   01/04/24 1000 -- 79 30 Abnormal  94/53 67 100 % -- -- --   01/04/24 0950 -- 80 31 Abnormal  96/55 68 100 % -- -- --   01/04/24 0900 -- 80 39 Abnormal  93/54 67 98 % -- -- --   01/04/24 0810 -- 80 37 Abnormal  -- -- 100 % -- -- --   01/04/24 0800 -- 80 37 Abnormal  99/65 78 98 % 80 Ventilator --   01/04/24 0745 -- 80 37 Abnormal  97/61 75 100 % -- -- --   01/04/24 0737 98.8 °F (37.1 °C) 80 37 Abnormal  117/53 76 99 % -- Ventilator Lying   01/04/24 0700 -- 79 35 Abnormal  101/61 76 96 % -- -- --   01/04/24 0600 -- 77 26 Abnormal  97/60 72 97 % -- -- --   01/04/24 0540 -- 74 -- 107/58 -- -- -- -- --   01/04/24 0512 -- 71 -- 106/66 -- -- -- -- --   01/04/24 0406 -- 69 -- 121/71 -- -- -- -- --   01/04/24 0300 98.8 °F (37.1 °C) 72 15 94/53 71 100 % -- -- --   01/04/24 0203 -- 71 -- 108/64 -- -- -- -- --   01/04/24 0134 -- 74 -- 113/71 -- -- -- -- --   01/04/24 0054 -- 69 -- 84/53 Abnormal  -- -- -- -- --   01/04/24 0036 -- 89 -- 116/73 -- -- -- -- --   01/04/24 0012 -- 72 34 Abnormal  85/55 Abnormal  66 89 % Abnormal  -- -- --   01/04/24 0010 -- 72 34 Abnormal  80/53 Abnormal  61 Abnormal  90 % -- -- --   01/04/24 0006 -- 72 -- 69/48 Abnormal  -- -- -- -- --   01/04/24 0000 98.3 °F (36.8 °C) 74 34 Abnormal  71/45 Abnormal  53 Abnormal  90 % -- -- --   01/03/24 2315 -- 82 48 Abnormal  109/71 80 81 % Abnormal  -- -- --    01/03/24 2300 -- 84 41 Abnormal  134/73 98 84 % Abnormal  -- -- --   01/03/24 2233 -- 38 Abnormal  35 Abnormal  -- -- 81 % Abnormal  -- -- --   01/03/24 2000 97.7 °F (36.5 °C) 80 45 Abnormal  -- -- 92 % -- -- --   01/03/24 1909 97.7 °F (36.5 °C) 84 28 Abnormal  150/76 107 90 % -- None (Room air) Lying   01/03/24 1700 97.9 °F (36.6 °C) 82 41 Abnormal  130/85 102 95 % -- -- --   01/03/24 1622 97.8 °F (36.6 °C) 84 27 Abnormal  -- -- -- -- -- --   01/03/24 1500 97.3 °F (36.3 °C) Abnormal  84 -- 135/63 90 96 % -- Nasal cannula --   01/03/24 1400 93.9 °F (34.4 °C) Abnormal  68 -- 134/64 -- 98 % -- -- --   O2 Device: placed on 2lt for comfort at 01/03/24 1310   01/03/24 1300 90.9 °F (32.7 °C) Abnormal  54 Abnormal  -- 131/80 -- -- -- -- --   01/03/24 1200 87.4 °F (30.8 °C) Abnormal  48 Abnormal  16 156/74 -- 91 % -- -- --   01/03/24 1110 -- 45 Abnormal  14 173/77 Abnormal  -- 90 % --       Pertinent Labs/Diagnostic Test Results:   XR chest portable   Final Result by Oscar Tobin DO (01/04 1240)      Life-support tubes as above. Recommend advancement of NG tube into the stomach.      Right upper lobe consolidation/cavitation/mass again evident. Additional cavitation in the right lower lobe on CT is not as well defined on this study.               Workstation performed: [id number]         CT head wo contrast   Final Result by Manohar Wright MD (01/04 0129)      Mild microangiopathic changes and sequelae of old lacunar infarcts without acute intracranial hemorrhage or depressed calvarial fracture. If there is clinical concern for acute ischemia, recommend more sensitive MRI brain for better evaluation               Workstation performed: [id number]         XR chest portable   Final Result by Ivan Cole MD (01/04 1120)      Endotracheal tube in place distal tip well above the tobi.      Enteric tube in place with distal tip just above the GE junction. See above discussion. Advise advancing the tube  4 to 5 cm.      No other significant interval change.      This study demonstrates a significant  finding and was documented as such in [facility] for liaison and referring practitioner notification.      Workstation performed: [id number]         MRI cervical spine wo contrast   Final Result by Geo Barrios DO (01/03 1652)      Diffuse osseous metastatic disease throughout the cervical and upper thoracic spine with some areas of extraosseous extension of disease most noteworthy at the left aspect of C7 and the right aspect of T1. The lesion at T1 appears to extend into the    neural foramen resulting in significant foraminal narrowing on the right. Consider follow-up postcontrast imaging in particular if surgery or radiation therapy is being considered.      Multilevel cervical spondylitic degenerative changes superimposed upon the findings above with significant foraminal narrowing present at the C6-7 level, left greater than right.      No cord compression or abnormal signal to suggest central cord lesion.      I personally discussed this study with trauma service on 1/3/2024 4:52 PM.                     Workstation performed: [id number]         TRAUMA - CT head wo contrast   Final Result by Honorio Mejia MD (01/03 1209)      No acute intracranial abnormality.      I personally discussed this study with LAURYN ULLRICH on 1/3/2024 12:08 PM.                  Workstation performed: [id number]         TRAUMA - CT spine cervical wo contrast   Final Result by Honorio Mejia MD (01/03 1209)      No acute fracture.      Osseous lytic lesions as described above concerning for metastatic disease or myeloma. Moderate cervical spondylosis.      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      I personally discussed this study with LAURYN ULLRICH on 1/3/2024 12:08 PM.               Workstation performed: [id number]         TRAUMA - CT chest abdomen pelvis w contrast   Final  Result by Honorio Mejia MD (01/03 1208)      No definite acute traumatic CT findings.      Right suprahilar mass concerning for neoplasm as above.      Cavitary right upper and right lower lobe lesions as described above. These may reflect areas of cavitary pneumonia or neoplasm.      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      Mediastinal lymphadenopathy concerning for metastatic disease.      Bilateral adrenal gland masses concerning for metastatic disease.      Osseous lytic lesions concerning for metastatic disease.      Striated nephrograms right greater than left concerning for bilateral pyelonephritis. No hydronephrosis.      Fecal impaction.      Infrarenal abdominal aortic aneurysm, slightly increased in size since the prior study. Bilateral common iliac artery aneurysms.      Additional findings as above.               I personally discussed this study with LAURYN ULLRICH on 1/3/2024 12:08 PM.            Workstation performed: [id number]         XR trauma multiple   Final Result by Ivan Cole MD (01/04 0814)      No evidence of acute posttraumatic thoracic process with limitations of supine imaging.      Large right upper lobe cavitary consolidation. See subsequent chest CT report for further details.         Workstation performed: [id number]         XR chest 1 view    (Results Pending)     1/3/24 ecg - Normal sinus rhythm  Possible Lateral infarct , age undetermined  Abnormal ECG  No previous ECGs available    Results from last 7 days   Lab Units 01/04/24  0442 01/03/24  2257 01/03/24  1116 01/03/24  1113   WBC Thousand/uL 30.34* 29.30* 40.11*  --    HEMOGLOBIN g/dL 9.6* 10.3* 10.3*  --    I STAT HEMOGLOBIN g/dl  --   --   --  11.6*   HEMATOCRIT % 30.3* 34.6* 34.3*  --    HEMATOCRIT, ISTAT %  --   --   --  34*   PLATELETS Thousands/uL 221 251 234  --    BANDS PCT % 14*  --  7  --      Results from last 7 days   Lab  Units 01/04/24  1204 01/04/24  0442 01/03/24 2257 01/03/24  1533 01/03/24  1116 01/03/24  1113   SODIUM mmol/L 139 138 139 134* 134*  --    POTASSIUM mmol/L 5.0 5.7* 5.4* 4.7 5.3  --    CHLORIDE mmol/L 101 98 99 95* 94*  --    CO2 mmol/L 26 23 23 25 21  --    CO2, I-STAT mmol/L  --   --   --   --   --  22   ANION GAP mmol/L 12 17 17 14 19  --    BUN mg/dL 56* 51* 52* 47* 42*  --    CREATININE mg/dL 1.98* 1.73* 1.87* 1.90* 2.12*  --    EGFR ml/min/1.73sq m 34 41 37 36 32  --    CALCIUM mg/dL 9.2 9.3 10.5* 10.2 11.8*  --    CALCIUM, IONIZED mmol/L  --   --  1.31  --   --   --    CALCIUM, IONIZED, ISTAT mmol/L  --   --   --   --   --  1.48*   MAGNESIUM mg/dL  --  2.8* 2.9*  --  2.7  --    PHOSPHORUS mg/dL  --  7.0* 8.4*  --  9.4*  --      Results from last 7 days   Lab Units 01/03/24 2257 01/03/24  1533 01/03/24  1116   AST U/L 336* 325* 221*   ALT U/L 59* 48 35   ALK PHOS U/L 129* 139* 146*   TOTAL PROTEIN g/dL 5.4* 5.6* 6.3*   ALBUMIN g/dL 2.5* 2.5* 2.9*   TOTAL BILIRUBIN mg/dL 2.41* 2.31* 2.32*     Results from last 7 days   Lab Units 01/04/24  0952 01/04/24  0849   POC GLUCOSE mg/dl 103 67     Results from last 7 days   Lab Units 01/04/24  1204 01/04/24 0442 01/03/24 2257 01/03/24  1533 01/03/24  1116   GLUCOSE RANDOM mg/dL 73 73 118 92 86     Results from last 7 days   Lab Units 01/03/24  2328   PH ART  7.192*   PCO2 ART mm Hg 56.3*   PO2 ART mm Hg 101.9   HCO3 ART mmol/L 21.1*   BASE EXC ART mmol/L -7.2   O2 CONTENT ART mL/dL 14.2*   O2 HGB, ARTERIAL % 93.5*   ABG SOURCE  Artery     Results from last 7 days   Lab Units 01/04/24  1204 01/04/24  0442 01/04/24  0222   PH ERICA  7.279* 7.244* 7.232*   PCO2 ERICA mm Hg 54.7* 48.7 60.5*   PO2 ERICA mm Hg 34.8* 34.9* 35.7   HCO3 ERICA mmol/L 25.1 20.6* 24.9   BASE EXC ERICA mmol/L -2.1 -6.5 -3.1   O2 CONTENT ERICA ml/dL 7.5 6.3 6.8   O2 HGB, VENOUS % 51.3* 48.7* 47.4*     Results from last 7 days   Lab Units 01/03/24  1113   PH, ERICA I-STAT  7.236*   PCO2, ERICA ISTAT mm HG 48.2    PO2, ERICA ISTAT mm HG 29.0*   HCO3, ERICA ISTAT mmol/L 20.5*   I STAT BASE EXC mmol/L -7*   I STAT O2 SAT % 43*     Results from last 7 days   Lab Units 01/04/24 0442 01/03/24 2026 01/03/24  1533   CK TOTAL U/L 5,812* 13,159* 8,880*     Results from last 7 days   Lab Units 01/04/24  0210 01/03/24 2257 01/03/24  1533 01/03/24  1344 01/03/24  1116   HS TNI 0HR ng/L  --  1,062*  --   --  862*   HS TNI 2HR ng/L 916*  --   --  669*  --    HSTNI D2 ng/L -146  --   --  -193  --    HS TNI 4HR ng/L  --   --  732*  --   --    HSTNI D4 ng/L  --   --  -130  --   --      Results from last 7 days   Lab Units 01/03/24  1116   PROTIME seconds 26.1*   INR  2.28*   PTT seconds 50*     Results from last 7 days   Lab Units 01/03/24  1116   TSH 3RD GENERATON uIU/mL 1.147     Results from last 7 days   Lab Units 01/04/24  0442 01/04/24 0210 01/03/24 2257 01/03/24 2026 01/03/24  1805 01/03/24  1533 01/03/24  1344   LACTIC ACID mmol/L 2.8* 4.0* 7.5* 3.2* 3.6* 3.6* 4.2*     Results from last 7 days   Lab Units 01/03/24  1218   CLARITY UA  Turbid   COLOR UA  Yellow   SPEC GRAV UA  1.022   PH UA  7.0   GLUCOSE UA mg/dl Trace*   KETONES UA mg/dl Negative   BLOOD UA  Large*   PROTEIN UA mg/dl 100 (2+)*   NITRITE UA  Negative   BILIRUBIN UA  Negative   UROBILINOGEN UA (BE) mg/dl <2.0   LEUKOCYTES UA  Negative   WBC UA /hpf 4-10*   RBC UA /hpf 4-10*   BACTERIA UA /hpf Occasional   EPITHELIAL CELLS WET PREP /hpf Occasional     Results from last 7 days   Lab Units 01/03/24  1200   AMPH/METH  Negative   BARBITURATE UR  Negative   BENZODIAZEPINE UR  Negative   COCAINE UR  Negative   METHADONE URINE  Negative   OPIATE UR  Negative   PCP UR  Negative   THC UR  Negative     Results from last 7 days   Lab Units 01/03/24  1116   ETHANOL LVL mg/dL 15*   ACETAMINOPHEN LVL ug/mL <2*   SALICYLATE LVL mg/dL <5     Results from last 7 days   Lab Units 01/03/24  1216 01/03/24  1202   BLOOD CULTURE  Received in Microbiology Lab. Culture in Progress. Received  in Microbiology Lab. Culture in Progress.       ED Treatment:   Medication Administration from 01/03/2024 1058 to 01/03/2024 1624         Date/Time Order Dose Route Action Comments     01/03/2024 1320 EST metroNIDAZOLE (FLAGYL) IVPB (premix) 500 mg 100 mL 500 mg Intravenous New Bag --     01/03/2024 1511 EST vancomycin (VANCOCIN) 2,000 mg in sodium chloride 0.9 % 500 mL IVPB 2,000 mg Intravenous New Bag --     01/03/2024 1312 EST cefepime (MAXIPIME) 1,000 mg in dextrose 5 % 50 mL IVPB 1,000 mg Intravenous New Bag --     01/03/2024 1418 EST multi-electrolyte (ISOLYTE-S PH 7.4) bolus 1,000 mL 1,000 mL Intravenous New Bag --          History reviewed. No pertinent past medical history.  Present on Admission:  **None**      Admitting Diagnosis: Multiple injuries due to trauma [T07.XXXA]  Sepsis, due to unspecified organism, unspecified whether acute organ dysfunction present (HCC) [A41.9]  Age/Sex: 63 y.o. male  Admission Orders: 1/3/24 1231 inpatient   Scheduled Medications:  chlorhexidine, 15 mL, Mouth/Throat, Q12H ALINA  chlorhexidine, 15 mL, Mouth/Throat, Q12H ALINA  heparin (porcine), 5,000 Units, Subcutaneous, Q8H ALINA  piperacillin-tazobactam, 3.375 g, Intravenous, Q8H    cefepime (MAXIPIME) 1,000 mg in dextrose 5 % 50 mL IVPB  Dose: 1,000 mg  Freq: Every 12 hours Route: IV  Last Dose: Stopped (01/03/24 1330)  Start: 01/03/24 1300 End: 01/03/24 1610   metroNIDAZOLE (FLAGYL) IVPB (premix) 500 mg 100 mL  Dose: 500 mg  Freq: Every 8 hours Route: IV  Last Dose: Stopped (01/04/24 0708)  Start: 01/03/24 1145 End: 01/03/24 1610   piperacillin-tazobactam (ZOSYN) 4.5 g in sodium chloride 0.9 % 100 mL IVPB  Dose: 4.5 g  Freq: Once Route: IV  Last Dose: Stopped (01/03/24 2008)  Start: 01/03/24 1645 End: 01/03/24 2008     dextrose 50 % IV solution 25 mL  Dose: 25 mL  Freq: Once Route: IV  Start: 01/04/24 0815 End: 01/04/24 0839   insulin regular (HumuLIN R,NovoLIN R) injection 10 Units  Dose: 10 Units  Freq: Once Route:  IV  Indications of Use: HYPERKALEMIA  Start: 01/04/24 0815 End: 01/04/24 0850     multi-electrolyte (ISOLYTE-S PH 7.4) bolus 500 mL  Dose: 500 mL  Freq: Once Route: IV  Last Dose: Stopped (01/04/24 0708)  Start: 01/04/24 0315 End: 01/04/24 0708   multi-electrolyte (ISOLYTE-S PH 7.4) bolus 500 mL  Dose: 500 mL  Freq: Once Route: IV  Last Dose: Stopped (01/04/24 0708)  Start: 01/04/24 0100 End: 01/04/24 0708   multi-electrolyte (PLASMALYTE-A/ISOLYTE-S PH 7.4) IV solution 1,000 mL  Dose: 1,000 mL  Freq: Once Route: IV  Last Dose: Stopped (01/03/24 1748)  Start: 01/03/24 1500 End: 01/03/24 1748   multi-electrolyte (PLASMALYTE-A/ISOLYTE-S PH 7.4) IV solution 500 mL  Dose: 500 mL  Freq: Once Route: IV  Last Dose: Stopped (01/03/24 1745)  Start: 01/03/24 1500 End: 01/03/24 1745       Continuous IV Infusions:  fentaNYL, 50 mcg/hr, Intravenous, Continuous  multi-electrolyte, 125 mL/hr, Intravenous, Continuous   norepinephrine, 1-30 mcg/min, Intravenous, Titrated  propofol, 5-50 mcg/kg/min, Intravenous, Titrated      PRN Meds:  albuterol, 1 puff, Inhalation, Q6H PRN  fentanyl citrate (PF), 50 mcg, Intravenous, Q1H PRN x 1 1/3/24  HYDROmorphone, 0.5 mg, Intravenous, Q4H PRN  x 1 1/3/24    Mechanical ventilation  Orogastric tube  Neuro checks every 4 hours  Cardio pulmonary monitoring   Restraints non violent   NPO    IP CONSULT TO CASE MANAGEMENT  IP CONSULT TO NEUROSURGERY    Network Utilization Review Department  ATTENTION: Please call with any questions or concerns to [phone] and carefully listen to the prompts so that you are directed to the right person. All voicemails are confidential.   For Discharge needs, contact Care Management DC Support Team at [phone] opt. [phone]  Send all requests for admission clinical reviews, approved or denied determinations and any other requests to dedicated fax number below belonging to the campus where the patient is receiving treatment. List of dedicated fax numbers for the  Facilities:  FACILITY NAME UR FAX NUMBER   ADMISSION DENIALS (Administrative/Medical Necessity) [phone]   DISCHARGE SUPPORT TEAM (NETWORK) [phone]   PARENT CHILD HEALTH (Maternity/NICU/Pediatrics) [phone]   [facility] [phone]   [facility] [phone]   [facility] [phone]   [facility] [phone]   [facility] [phone]   [facility] [phone]   [facility] [phone]   [facility] [phone]   [facility] [phone]   [facility] [phone]   [facility] [phone]

## 2024-01-04 NOTE — PROGRESS NOTES
[facility]  Progress Note  Name: Cezar Enciso I  MRN: [id number]  Unit/Bed#: ICU 16 I Date of Admission: 1/3/2024   Date of Service: 1/4/2024 I Hospital Day: 1    Assessment/Plan   * Cardiac arrest (HCC)  Assessment & Plan  Patient was found to be unresponsive on 1/3/2023 at night  On pulse check, patient did not have a palpable pulse, with PEA rhythm.   CPR was initiated patient was given 1 dose of epinephrine.  ROSC was achieved after around a minute.    Intubated. Settings ACVC 18/450/100/10  CBC shows WBC of 29.3, which is downtrending from 40 on admission  ABG 7.192, 56, 21.1, => 7. 232, 60  Lactic acid 7.5  Troponin 1062 (on admission 862 => 669 => 732)  Potassium 5.4  Creatinine downtrending 1.87     Unknown etiology.  Possibly from hypovolemia in the setting of sepsis.  Acidosis also possible since patient's pH was 7.192.  Patient does not have marked hyperkalemia.  Patient is also no longer hypothermic.  X-ray did not show pneumothorax.  Ultrasound did not show any cardiac effusion.  Bedside ultrasound done and patient appears volume depleted.      Ordered another 1 L  bolus extra  Continue Isolyte 250 mill mL per hour  Increased tidal volume to 450    Severe sepsis (HCC)  Assessment & Plan  Patient with hypothermia, elevated WBC of 40, elevated lactic acid 9.  VBG consistent with pH of 7.2, pCO2 48, pO2 29, bicarb of 20  Patient underwent CT chest abdomen and pelvis remarkable for cavitary lesion on the right upper lung concerning for cavitary pneumonia, there is also noted changes consistent with bilateral pyelonephritis.  Urinalysis showing WBC 4-10, RBC 4-10 granular casts of 10-25.    Plan:  Pending blood culture and MRSA culture  Status post undocumented 1 L of IV isolate in trauma bay.  Ordered 30 ml/kg of fluid resuscitation per sepsis protocol  Continue Zosyn  Continue trending lactic acid until clears.  Continue rewarming with warm IV fluids and  bear-hugger.        Hypothermia  Assessment & Plan  Patient presents after being found outside on the ground (unwitnessed fall).  Patient with dementia unable to provide relevant story.  POA Temperature 30.2 C and bradycardia in 40s. BP slight high on admission and maintained stable.  Patient is now normotensive  TSH normal  Plan  Continue to monitor      Paresthesia of upper extremity  Assessment & Plan  Patient reports acute on chronic tingling and numbness of the upper extremities.   Initially was a concern for spinal column trauma. The C spine MRI was done showing Diffuse osseous metastatic disease throughout the cervical and upper thoracic spine with some areas of extraosseous extension of disease most noteworthy at the left aspect of C7 and the right aspect of T1. The lesion at T1 appears to extend into the neural foramen resulting in significant foraminal narrowing on the right.   Neurosurgery team consulted.  Patient will benefit from palliative care.  Plans for goals of care discussion     Increased anion gap metabolic acidosis  Assessment & Plan  On admission lactic acid 9.  Anion gap 19. Suspected in the setting of hypothermia versus severe sepsis.  Lactic acid down to 4, AG of 17.   Continue IV hydration    Elevated troponin  Assessment & Plan  POA troponin at 0h 862, T 2h 699.  EKG with sinus bradycardia.  No significant ST abnormalities.  Likely type 2 non MI trop elevation in the setting of hypothermia and sepsis.    Unwitnessed fall  Assessment & Plan  Patient found outside on the ground.  Initially admitted as a trauma patient.  Ruled out any acute trauma and transferred to critical care service.  Noted UDS positive for oxycodone however patient admits taking oxy for pain. Denies alcohol use.    Cavitating mass in right upper lung lobe  Assessment & Plan  Cavitary mass in  right upper noted on CT chest.  Recently 12/20/23  underwent bronchoscopy with biopsy and was diagnosed with squamous cell  carcinoma.  Patient will require hematology oncology follow-up.      COPD (chronic obstructive pulmonary disease) (HCC)  Assessment & Plan  Moderate COPD with severe emphysema .  Follows up with pulmonologist at [facility].  Spirometry/PFTs ordered for future  Patient on room air at baseline.  History of tobacco abuse 1 pack a day for the past 42 years. Quit at least a year ago  Currently taking varenicline for smoking cessation.    Abdominal aortic aneurysm (AAA) (Formerly [facility])  Assessment & Plan  According to current CT abdomen and pelvis: infrarenal abdominal aortic aneurysm, slightly increased in size since the prior study. Bilateral common iliac artery aneurysms.   Patient follows with vascular team in the outpatient setting..  Currently under surveillance every 6 months.    Peripheral artery disease (HCC)  Assessment & Plan  History of peripheral artery disease status post femoral popliteal bypass.  Taking clopidogrel 75 mg, atorvastatin 80 mg and Zeita 10 mg daily.  Will continue home medication      Abnormal finding on CT scan  Assessment & Plan  CT chest abdomen and pelvis revealing similar findings that were also noted on noted on CT chest on 12/12/23:  Right suprahilar mass concerning for neoplasm as above.  Cavitary right upper and right lower lobe lesions as described above. These may reflect areas of cavitary pneumonia or neoplasm.  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  Mediastinal lymphadenopathy concerning for metastatic disease.  Bilateral adrenal gland masses concerning for metastatic disease.  Osseous lytic lesions concerning for metastatic disease.    Patient with recent history of bronchoscopic biopsy right upper lobe on 12/20/23 positive for non-small cell carcinoma most likely squamous cell carcinoma.  Recommended outpatient pulmonology, heme onc follow up  Patient may also benefit from palliative care service.  Patient  receiving oxycodone as outpatient.  Ordered Dilaudid as needed since patient is complaining of neck pain    Hypercalcemia  Assessment & Plan  Lab Results   Component Value Date    CORRECTEDCA 11.7 (H) 01/03/2024    CORRECTEDCA 11.4 (H) 01/03/2024    CORRECTEDCA 12.7 (HH) 01/03/2024       Dehydration versus malignancy.  Continue IV hydration  If calcium persistently high consider workup for hypercalcemia including PTHrP    ELIE (acute kidney injury) (HCC)  Assessment & Plan  Lab Results   Component Value Date    CREATININE 1.87 (H) 01/03/2024    CREATININE 1.90 (H) 01/03/2024    CREATININE 2.12 (H) 01/03/2024    EGFR 37 01/03/2024    EGFR 36 01/03/2024    EGFR 32 01/03/2024      Patient presenting with creatinine of 2.12.  According to previous results patient baseline 0.8-1.1  Urinalysis showing large blood, granular casts, CK elevated likely myoglobin related kidney injury.  Continue IV hydration  Avoid nephrotoxic agents and hypoperfusion  Monitor daily I's and O's fluid status, and daily weight    Rhabdomyolysis  Assessment & Plan  Lab Results   Component Value Date    CKTOTAL 13,159 (H) 01/03/2024    CKTOTAL 8,880 (H) 01/03/2024    CKTOTAL 6,013 (H) 01/03/2024       Patient was found on the floor unknown for how long he has been laying.  Labs with ELIE on admission.  Will require aggressive hydration.  Ordered 30 ml/kg for sepsis protocol of warmed IV Isolyte  Will continue with maintenance at 250/hr                 Disposition: Critical care    ICU Core Measures     Vented Patient  VAP Bundle  VAP bundle ordered     A: Assess, Prevent, and Manage Pain Has pain been assessed? Yes  Need for changes to pain regimen? Yes   B: Both Spontaneous Awakening Trials (SATs) and Spontaneous Breathing Trials (SBTs) Plan to perform spontaneous awakening trial today? Yes   Plan to perform spontaneous breathing trial today? Yes   Obvious barriers to extubation? Yes   C: Choice of Sedation RASS Goal: -1 Drowsy or 0 Alert and  Calm  Need for changes to sedation or analgesia regimen? No   D: Delirium CAM-ICU: Negative   E: Early Mobility  Plan for early mobility? Yes   F: Family Engagement Plan for family engagement today? Yes       Antibiotic Review: Awaiting culture results.     Review of Invasive Devices:    Patti Plan: Continue for accurate I/O monitoring for 48 hours        Prophylaxis:  VTE VTE covered by:  heparin (porcine), Subcutaneous, 5,000 Units at 01/04/24 0250       Stress Ulcer  not ordered         Significant 24hr Events     24hr events: As noted with prev quick note. Patient had cardiac arrest     Subjective   Review of Systems   Unable to perform ROS: Intubated      Objective                            Vitals I/O      Most Recent Min/Max in 24hrs   Temp 98.8 °F (37.1 °C) Temp  Min: 85 °F (29.4 °C)  Max: 98.8 °F (37.1 °C)   Pulse 72 Pulse  Min: 38  Max: 89   Resp 15 Resp  Min: 14  Max: 48   BP 94/53 BP  Min: 69/48  Max: 185/80   O2 Sat 100 % SpO2  Min: 81 %  Max: 100 %      Intake/Output Summary (Last 24 hours) at 1/4/2024 0357  Last data filed at 1/4/2024 0200  Gross per 24 hour   Intake 2482.08 ml   Output 1030 ml   Net 1452.08 ml       Diet NPO    Invasive Monitoring           Physical Exam   Physical Exam  Eyes:      General: No dysconjugate gaze and no foreign body in eyeNo visual field deficit or scleral icterus.        Right eye: No discharge.         Left eye: No discharge.   Skin:     General: Skin is mottled extremities.      Findings: No lesion or rash.   HENT:      Head: No Vail's sign, abrasion, contusion or laceration.      Right Ear: No drainage.      Left Ear: No drainage.      Nose: No nasal deformity, congestion, rhinorrhea or epistaxis.   Neck:   no midline tenderness Cardiovascular:      Rate and Rhythm: Normal rate and regular rhythm.   Musculoskeletal:         General: No deformity or signs of injury.      Right lower leg: No edema.      Left lower leg: No edema.   Abdominal:      Tenderness: There  is no abdominal tenderness. There is no guarding.   Constitutional:       Appearance: He is ill-appearing and toxic-appearing.      Interventions: He is sedated and intubated.   Pulmonary:      Effort: No accessory muscle usage, respiratory distress or accessory muscle usage. He is intubated.      Breath sounds: Rales present.   Psychiatric:         Mood and Affect:  mood and affect abnormal.  Neurological:      Mental Status: He is unresponsive. He is CAM ICU negative.            Diagnostic Studies      EKG: sinus  Imaging: XRAY I have personally reviewed pertinent reports.       Medications:  Scheduled PRN   chlorhexidine, 15 mL, Q12H ALINA  chlorhexidine, 15 mL, Q12H ALINA  heparin (porcine), 5,000 Units, Q8H ALINA  multi-electrolyte, 500 mL, Once  piperacillin-tazobactam, 3.375 g, Q8H      albuterol, 1 puff, Q6H PRN  fentanyl citrate (PF), 50 mcg, Q1H PRN  HYDROmorphone, 0.5 mg, Q4H PRN       Continuous    fentaNYL, 50 mcg/hr, Last Rate: 50 mcg/hr (01/04/24 0239)  multi-electrolyte, 250 mL/hr, Last Rate: 250 mL/hr (01/04/24 0133)  norepinephrine, 1-30 mcg/min, Last Rate: 6 mcg/min (01/04/24 0248)  propofol, 5-50 mcg/kg/min, Last Rate: 25 mcg/kg/min (01/04/24 0318)         Labs:    CBC    Recent Labs     01/03/24  1116 01/03/24  2257   WBC 40.11* 29.30*   HGB 10.3* 10.3*   HCT 34.3* 34.6*    251   BANDSPCT 7  --      BMP    Recent Labs     01/03/24  1533 01/03/24  2257   SODIUM 134* 139   K 4.7 5.4*   CL 95* 99   CO2 25 23   AGAP 14 17   BUN 47* 52*   CREATININE 1.90* 1.87*   CALCIUM 10.2 10.5*       Coags    Recent Labs     01/03/24  1116   INR 2.28*   PTT 50*        Additional Electrolytes  Recent Labs     01/03/24  1113 01/03/24  1116 01/03/24  2257   MG  --  2.7 2.9*   PHOS  --  9.4* 8.4*   CAIONIZED 1.48*  --  1.31          Blood Gas    Recent Labs     01/03/24 2328   PHART 7.192*   UTM0ITA 56.3*   PO2ART 101.9   UJM2ROP 21.1*   BEART -7.2   SOURCE Artery     Recent Labs     01/03/24 2328 01/04/24  0222    PHVEN  --  7.232*   VJT4VVP  --  60.5*   PO2VEN  --  35.7   MZO9YUB  --  24.9   BEVEN  --  -3.1   G7EPXNI  --  47.4*   SOURCE Artery  --     LFTs  Recent Labs     01/03/24  1533 01/03/24  2257   ALT 48 59*   * 336*   ALKPHOS 139* 129*   ALB 2.5* 2.5*   TBILI 2.31* 2.41*       Infectious  No recent results  Glucose  Recent Labs     01/03/24  1116 01/03/24  1533 01/03/24  2257   GLUC 86 92 118                   Joe Higgins MD

## 2024-01-04 NOTE — PROGRESS NOTES
[facility]  Interval Progress Note  Name: Cezar Enciso I  MRN: [id number]  Unit/Bed#: ICU 16 I Date of Admission: 1/3/2024   Date of Service: 1/3/2024 I Hospital Day: 0      The patient is a 63-year-old male who is admitted for hypothermia.  At around 10:45 PM We got a call that he was unresponsive.  A few hours prior, patient was conversational and complained of some pain in his neck area.  He did receive some a dose of IV Dilaudid.  No other new medications were given.   On pulse check, patient did not have a palpable pulse, with PEA rhythm.  CPR was initiated patient was given 1 dose of epinephrine.  ROSC was achieved after around a minute.  Patient was intubated.  Sister was notified, and is agreeable to the plan.  His latest CK was up to 13,159 at around 8 PM.      VS Temp 98 , HR 80, /80, spo2 92%  Intubated. Settings ACVC 18/450/100/10  CBC shows WBC of 29.3, which is downtrending from 40 on admission  ABG 7.192, 56, 21.1,  Lactic acid 7.5  Troponin 1062 (on admission 862 => 669 => 732)  Potassium 5.4  Creatinine downtrending 1.87  We did order repeat, CMP, mag, Phos, ABG  X-ray pending    Unknown etiology.  Possibly from hypovolemia in the setting of sepsis.  Acidosis also possible since patient's pH was 7.192.  Patient does not have marked hyperkalemia.  Patient is also no longer hypothermic.  X-ray to rule out pneumothorax.  Ultrasound did not show any cardiac effusion.  Bedside ultrasound done and patient appears volume depleted.     Ordered another 1 L  bolus extra  Continue to monitor      Disposition: Critical care        Antibiotic Review: Patient on appropriate coverage based on culture data.     Review of Invasive Devices:    Patti Plan: Continue for accurate I/O monitoring for 48 hours        Prophylaxis:  VTE VTE covered by:  heparin (porcine), Subcutaneous, 5,000 Units at 01/03/24 1801       Stress Ulcer  not ordered              Subjective   Review of  Systems   Unable to perform ROS: Intubated      Objective                            Vitals I/O      Most Recent Min/Max in 24hrs   Temp 97.7 °F (36.5 °C) Temp  Min: 85 °F (29.4 °C)  Max: 97.9 °F (36.6 °C)   Pulse 80 Pulse  Min: 45  Max: 84   Resp (!) 45 Resp  Min: 14  Max: 45   /76 BP  Min: 113/64  Max: 185/80   O2 Sat 92 % SpO2  Min: 84 %  Max: 100 %      Intake/Output Summary (Last 24 hours) at 1/3/2024 2314  Last data filed at 1/3/2024 2200  Gross per 24 hour   Intake 2482.08 ml   Output 800 ml   Net 1682.08 ml       Diet NPO    Invasive Monitoring   None        Physical Exam   Physical Exam  Eyes:      General: No dysconjugate gaze and no foreign body in eyeNo visual field deficit or scleral icterus.        Right eye: No discharge.         Left eye: No discharge.   Skin:     General: Skin is mottled extremities.      Findings: No lesion or rash.   HENT:      Head: No Vail's sign, abrasion, contusion or laceration.      Comments: Patient appears dry      Right Ear: No drainage.      Left Ear: No drainage.      Nose: No nasal deformity, congestion, rhinorrhea or epistaxis.   Neck:   no midline tenderness Cardiovascular:      Rate and Rhythm: Normal rate and regular rhythm.   Musculoskeletal:         General: No deformity or signs of injury.      Right lower leg: No edema.      Left lower leg: No edema.   Abdominal:      Tenderness: There is no abdominal tenderness. There is no guarding.   Constitutional:       Appearance: He is ill-appearing and toxic-appearing.      Interventions: He is sedated and intubated.      Comments: Patient appears dry   Pulmonary:      Effort: No accessory muscle usage, respiratory distress or accessory muscle usage. He is intubated.      Breath sounds: No rales.      Comments: Decreased bs right  Psychiatric:         Mood and Affect:  mood and affect abnormal.  Neurological:      Mental Status: He is unresponsive. He is CAM ICU negative.   Genitourinary/Anorectal:  Armstrong  present.          Diagnostic Studies      EKG: Normal sinus  Imaging: CT chest      Medications:  Scheduled PRN   chlorhexidine, 15 mL, Q12H ALINA  chlorhexidine, 15 mL, Q12H ALINA  heparin (porcine), 5,000 Units, Q8H ALINA  norepinephrine 4 mg in 0.9% sodium chloride 250 mL, ,   piperacillin-tazobactam, 3.375 g, Q8H      albuterol, 1 puff, Q6H PRN  fentanyl citrate (PF), 50 mcg, Q1H PRN  HYDROmorphone, 0.5 mg, Q4H PRN  norepinephrine 4 mg in 0.9% sodium chloride 250 mL, ,        Continuous    multi-electrolyte, 250 mL/hr, Last Rate: 250 mL/hr (01/03/24 2157)  norepinephrine, 1-30 mcg/min  propofol, 5-50 mcg/kg/min, Last Rate: 20 mcg/kg/min (01/03/24 2309)         Labs:    CBC    Recent Labs     01/03/24  1116 01/03/24  2257   WBC 40.11* 29.30*   HGB 10.3* 10.3*   HCT 34.3* 34.6*    251   BANDSPCT 7  --      BMP    Recent Labs     01/03/24  1116 01/03/24  1533   SODIUM 134* 134*   K 5.3 4.7   CL 94* 95*   CO2 21 25   AGAP 19 14   BUN 42* 47*   CREATININE 2.12* 1.90*   CALCIUM 11.8* 10.2       Coags    Recent Labs     01/03/24  1116   INR 2.28*   PTT 50*        Additional Electrolytes  Recent Labs     01/03/24  1113 01/03/24  1116 01/03/24  2257   MG  --  2.7  --    PHOS  --  9.4*  --    CAIONIZED 1.48*  --  1.31          Blood Gas    No recent results  No recent results LFTs  Recent Labs     01/03/24  1116 01/03/24  1533   ALT 35 48   * 325*   ALKPHOS 146* 139*   ALB 2.9* 2.5*   TBILI 2.32* 2.31*       Infectious  No recent results  Glucose  Recent Labs     01/03/24  1116 01/03/24  1533   GLUC 86 92             Joe Higgins MD

## 2024-01-04 NOTE — ASSESSMENT & PLAN NOTE
Moderate COPD with severe emphysema .  Follows up with pulmonologist at [facility].  Spirometry/PFTs ordered for future  Patient on room air at baseline.  History of tobacco abuse 1 pack a day for the past 42 years. Quit at least a year ago  Currently taking varenicline for smoking cessation.

## 2024-01-04 NOTE — PROGRESS NOTES
Advanced care planning note;  Unable to complete full tertiary survey at this time.  Patient is currently intubated and sedated.  Upon evaluation the patient extremities; there does not appear to be any obvious acute deformities however unable to fully assess as patient again is intubated and sedated.  His abdominal exam is with minimal distention and no acute evidence of peritonitis.  There was a noted abrasion over the left knee on tertiary survey; Tdap will be ordered.  He may contact trauma team once patient is extubated and awake; at that time we will perform a full tertiary survey.  If any other issues arise; please feel free to reach out with any questions or concerns.

## 2024-01-04 NOTE — ASSESSMENT & PLAN NOTE
Lab Results   Component Value Date    CKTOTAL 13,159 (H) 01/03/2024    CKTOTAL 8,880 (H) 01/03/2024    CKTOTAL 6,013 (H) 01/03/2024       Patient was found on the floor unknown for how long he has been laying.  Labs with ELIE on admission.  Will require aggressive hydration.  Ordered 30 ml/kg for sepsis protocol of warmed IV Isolyte  Will continue with maintenance at 250/hr

## 2024-01-04 NOTE — NURSING NOTE
Patient expressed need to have a bowel movement. Put on bedpan and noted no bowel movement. Patient diaphoretic and making conversation.     Tried to change pulse ox probe and noted patient to be unresponsive and in PEA.     Compressions initiated. AP at bedside.

## 2024-01-05 NOTE — DEATH NOTE
INPATIENT DEATH NOTE  Cezar Enciso 63 y.o. male MRN: [id number]  Unit/Bed#: ICU 16 Encounter: [id number]    Date, Time and Cause of Death    Date of Death: 24  Time of Death:  9:51 PM  Preliminary Cause of Death: Cardiac arrest (HCC)  Entered by: Dr. Sheffield[DW1.1]       Attribution       DW1.1 Simone Sheffield DO 24 22:22             Patient's Information  Pronounced by: Dr. Sheffield  Did the patient's death occur in the ED?: No  Did the patient's death occur in the OR?: No  Did the patient's death occur less than 10 days post-op?: No  Did the patient's death occur within 24 hours of admission?: No  Was code status DNR at the time of death?: Yes    PHYSICAL EXAM:  Unresponsive to noxious stimuli, Spontaneous respirations absent, Breath sounds absent, Carotid pulse absent, Heart sounds absent, and Pupillary light reflex absent    Medical Examiner notification criteria:  NONE APPLICABLE   Medical Examiner's office notified?:  No, does not meet ME notification criteria   Medical Examiner accepted case?:  No  Name of Medical Examiner: None    Family Notification  Was the family notified?: Yes  Date Notified: 24  Time Notified: 09  Notified by: Dr. Sheffield  Name of Family Notified of Death: Lucrecia Andrea   Relationship to Patient: Sister  Family Notification Route: At bedside  Was the family told to contact a  home?: Yes  Name of  Home:: [place]  Home    Autopsy Options:  Post-mortem examination declined by next of kin    Primary Service Attending Physician notified?:  no    Physician/Resident responsible for completing Discharge Summary:  Dr. Sheffield

## 2024-01-05 NOTE — QUICK NOTE
- Patient has been transitioned to comfort care measures only.  - Continue to treat pain and discomfort per palliative measures.  - No further imaging or trauma work up indicated.  - Trauma service signing off.

## 2024-01-05 NOTE — UTILIZATION REVIEW
NOTIFICATION OF INPATIENT ADMISSION   AUTHORIZATION REQUEST   SERVICING FACILITY:   [place] [address]  Tax ID: [id number]  NPI: [id number]   ATTENDING PROVIDER:  Attending Name and NPI#: Megha Hall Do [[id number]]  Address: [address] [place] [address]  Phone: [phone]     ADMISSION INFORMATION:  Place of Service: Inpatient [facility]  Place of Service Code: 21  Inpatient Admission Date/Time: 1/3/24 12:31 PM  Discharge Date/Time: 1/5/2024  3:02 AM  Admitting Diagnosis Code/Description:  Multiple injuries due to trauma [T07.XXXA]  Sepsis, due to unspecified organism, unspecified whether acute organ dysfunction present (HCC) [A41.9]     UTILIZATION REVIEW CONTACT:  Chester Robertson Utilization   Network Utilization Review Department  Phone: [phone]  Fax: [phone]  Email: [email or web address]  Contact for approvals/pending authorizations, clinical reviews, and discharge.     PHYSICIAN ADVISORY SERVICES:  Medical Necessity Denial & Zdjr-ew-Hywn Review  Phone: [phone]  Fax: [phone]  Email: [email or web address]     DISCHARGE SUPPORT TEAM:  For Patients Discharge Needs & Updates  Phone: [phone] opt. 2 Fax: [phone]  Email: [email or web address]

## 2024-01-07 LAB
BACTERIA BLD CULT: NORMAL
BACTERIA BLD CULT: NORMAL

## 2024-01-08 LAB
BACTERIA BLD CULT: NORMAL
BACTERIA BLD CULT: NORMAL

## 2024-01-09 NOTE — UTILIZATION REVIEW
NOTIFICATION OF ADMISSION DISCHARGE   This is a Notification of Discharge from [facility]. Please be advised that this patient has been discharge from our facility. Below you will find the admission and discharge date and time including the patient’s disposition.   UTILIZATION REVIEW CONTACT:  Chester Robertson  Utilization   Network Utilization Review Department  Phone: [phone] x carefully listen to the prompts. All voicemails are confidential.  Email: [email or web address]     ADMISSION INFORMATION  PRESENTATION DATE: 1/3/2024 11:04 AM  OBERVATION ADMISSION DATE:   INPATIENT ADMISSION DATE: 1/3/24 12:31 PM   DISCHARGE DATE: 2024  3:02 AM   DISPOSITION:    Network Utilization Review Department  ATTENTION: Please call with any questions or concerns to [phone] and carefully listen to the prompts so that you are directed to the right person. All voicemails are confidential.   For Discharge needs, contact Care Management DC Support Team at [phone] opt. [phone]  Send all requests for admission clinical reviews, approved or denied determinations and any other requests to dedicated fax number below belonging to the campus where the patient is receiving treatment. List of dedicated fax numbers for the Facilities:  FACILITY NAME UR FAX NUMBER   ADMISSION DENIALS (Administrative/Medical Necessity) [phone]   DISCHARGE SUPPORT TEAM ([facility]) [phone]   PARENT CHILD HEALTH (Maternity/NICU/Pediatrics) [phone]   [facility] [phone]   [facility] [phone]   [facility] [phone]   [facility] [phone]   [facility] [phone]   [facility] [phone]   [facility] [phone]   [facility] [phone]   [facility]  [facility] [phone]   [facility] [phone]   [facility] [phone]

## 2024-01-20 NOTE — DISCHARGE SUMMARY
Discharge Summary - Cezar Enciso 63 y.o. male MRN: [id number]    Unit/Bed#: ICU 16 Encounter: [id number] PCP: Codie Mon MD    Admission Date:   Admission Orders (From admission, onward)       Ordered        01/03/24 1231  Inpatient Admission  Once                            Admitting Diagnosis: Multiple injuries due to trauma [T07.XXXA]  Sepsis, due to unspecified organism, unspecified whether acute organ dysfunction present (HCC) [A41.9]    HPI: Cezar Enciso is a 63 y.o. male with PMH of AAA, PAD, COPD with emphysema, tobacco abuse, recently diagnosed with squamous cell carcinoma of the lung, hypertension, hyperlipidemia who presents after being found down outside by the neighbor about 5 am, unwitnessed fall. Patient mentions that he remembers going outside but does not remember the moment he fell  On EMS arrival patient was noted to be hypothermic with temperature of 30.2 C.  Patient was brought as a trauma patient.  Was assessed by trauma team and ruled out any acute fracture or internal organ damage.  Was transitioned to critical care service for the hypothermia management as well as hypercalcemia and metabolic acidosis.  Despite maximum medical management the patient failed to improve and was transitioned to comfort care with comfort measures only and he passed away peacefully with family at bedside.    Procedures Performed:   Orders Placed This Encounter   Procedures    Fast Ultrasound    Intubation       Summary of Hospital Course: Patient presented to the ED as a trauma alert after being found down outside at 5 AM for unknown amount of time.  He was ultimately cleared of traumatic injury and admitted to the hospital under the critical care team.  Despite maximum medica management the patient did not improve and after discussion with family he was transitioned to comfort measures only.  He passed away peacefully with his family at bedside.    Significant Findings, Care, Treatment and  Services Provided: Upon admission patient had elevated CK concerning for Rhabdomyolysis as well as severe sepsis.  After admission patient experienced cardiac arrest with successful resuscitation.  He underwent intubation with presser administration to maintain adequate blood pressure.       Complications: None    Disposition:      Final Diagnosis: Cardiac arrest secondary to metastatic disease and severe sepsis    Medical Problems       Resolved Problems  Date Reviewed: 2024   None         Condition at Time of Death: critical but comfort measures only.    Date, Time and Cause of Death    Date of Death: 24  Time of Death:  9:51 PM  Preliminary Cause of Death: Cardiac arrest (HCC)  Entered by: Dr. Sheffield[DW1.1]       Attribution       DW1.1 Simone Sheffield, DO 24 22:22            Death Note:    INPATIENT DEATH NOTE  Cezar Enciso 63 y.o. male MRN: [id number]  Unit/Bed#: ICU 16 Encounter: [id number]    Date, Time and Cause of Death    Date of Death: 24  Time of Death:  9:51 PM  Preliminary Cause of Death: Cardiac arrest (HCC)  Entered by: Dr. Sheffield[DW1.1]       Attribution       DW1.1 Simone Sheffield, DO 24 22:22             Patient's Information  Pronounced by: Dr. Sheffield  Did the patient's death occur in the ED?: No  Did the patient's death occur in the OR?: No  Did the patient's death occur less than 10 days post-op?: No  Did the patient's death occur within 24 hours of admission?: No  Was code status DNR at the time of death?: Yes    PHYSICAL EXAM:  Unresponsive to noxious stimuli, Spontaneous respirations absent, Breath sounds absent, Carotid pulse absent, Heart sounds absent, and Pupillary light reflex absent    Medical Examiner notification criteria:  NONE APPLICABLE   Medical Examiner's office notified?:  No, does not meet ME notification criteria   Medical Examiner accepted case?:  No  Name of Medical Examiner: None    Family Notification  Was the family  notified?: Yes  Date Notified: 24  Time Notified: 951  Notified by: Dr. Sheffield  Name of Family Notified of Death: Lucrecia Mendez   Relationship to Patient: Sister  Family Notification Route: At bedside  Was the family told to contact a  home?: Yes  Name of  Home:: [place]  Home    Autopsy Options:  Post-mortem examination declined by next of kin    Primary Service Attending Physician notified?:  no    Physician/Resident responsible for completing Discharge Summary:  Dr. Sheffield          Attestation signed by Megha Hall DO at 2024  6:20 PM:  Noted
